# Patient Record
Sex: FEMALE | Race: WHITE | Employment: PART TIME | URBAN - METROPOLITAN AREA
[De-identification: names, ages, dates, MRNs, and addresses within clinical notes are randomized per-mention and may not be internally consistent; named-entity substitution may affect disease eponyms.]

---

## 2018-09-15 ENCOUNTER — HOSPITAL ENCOUNTER (OUTPATIENT)
Age: 48
Discharge: HOME | End: 2018-09-15
Payer: COMMERCIAL

## 2018-09-15 LAB
BACTERIA: ABNORMAL /HPF
BILIRUBIN URINE: NEGATIVE
BLOOD, URINE: ABNORMAL
CLARITY: CLEAR
COLOR: YELLOW
EPITHELIAL CELLS, UA: ABNORMAL /HPF
GLUCOSE URINE: NEGATIVE MG/DL
KETONES, URINE: NEGATIVE MG/DL
LEUKOCYTE ESTERASE, URINE: NEGATIVE
NITRITE, URINE: NEGATIVE
PH UA: 6 (ref 5–9)
PROTEIN UA: ABNORMAL MG/DL
RBC UA: ABNORMAL /HPF (ref 0–2)
SPECIFIC GRAVITY UA: 1.02 (ref 1–1.03)
UROBILINOGEN, URINE: 0.2 E.U./DL
WBC UA: ABNORMAL /HPF (ref 0–5)

## 2018-09-15 PROCEDURE — 81001 URINALYSIS AUTO W/SCOPE: CPT

## 2018-09-15 PROCEDURE — 87088 URINE BACTERIA CULTURE: CPT

## 2018-09-17 LAB — URINE CULTURE, ROUTINE: NORMAL

## 2020-02-07 ENCOUNTER — HOSPITAL ENCOUNTER (OUTPATIENT)
Age: 50
Discharge: HOME | End: 2020-02-07
Payer: COMMERCIAL

## 2020-02-07 LAB
BACTERIA: ABNORMAL /HPF
BILIRUBIN URINE: NEGATIVE
BLOOD, URINE: ABNORMAL
CLARITY: CLEAR
COLOR: YELLOW
CRYSTALS, UA: ABNORMAL
EPITHELIAL CELLS, UA: ABNORMAL /HPF
GLUCOSE URINE: NEGATIVE MG/DL
INR BLD: 0.9
KETONES, URINE: NEGATIVE MG/DL
LEUKOCYTE ESTERASE, URINE: NEGATIVE
NITRITE, URINE: NEGATIVE
PH UA: 6 (ref 5–9)
PROTEIN UA: 30 MG/DL
PROTHROMBIN TIME: 10.1 SEC (ref 9.3–12.4)
RBC UA: >20 /HPF (ref 0–2)
SPECIFIC GRAVITY UA: 1.02 (ref 1–1.03)
UROBILINOGEN, URINE: 0.2 E.U./DL
WBC UA: ABNORMAL /HPF (ref 0–5)

## 2020-02-07 PROCEDURE — 36415 COLL VENOUS BLD VENIPUNCTURE: CPT

## 2020-02-07 PROCEDURE — 85610 PROTHROMBIN TIME: CPT

## 2020-02-07 PROCEDURE — 81001 URINALYSIS AUTO W/SCOPE: CPT

## 2020-02-07 PROCEDURE — 87088 URINE BACTERIA CULTURE: CPT

## 2020-02-09 LAB — URINE CULTURE, ROUTINE: NORMAL

## 2020-02-17 ENCOUNTER — APPOINTMENT (OUTPATIENT)
Dept: GENERAL RADIOLOGY | Age: 50
End: 2020-02-17
Payer: COMMERCIAL

## 2020-02-17 ENCOUNTER — HOSPITAL ENCOUNTER (EMERGENCY)
Age: 50
Discharge: HOME | End: 2020-02-17
Attending: EMERGENCY MEDICINE
Payer: COMMERCIAL

## 2020-02-17 ENCOUNTER — APPOINTMENT (OUTPATIENT)
Dept: CT IMAGING | Age: 50
End: 2020-02-17
Payer: COMMERCIAL

## 2020-02-17 VITALS
DIASTOLIC BLOOD PRESSURE: 97 MMHG | WEIGHT: 135 LBS | HEART RATE: 66 BPM | BODY MASS INDEX: 23.92 KG/M2 | RESPIRATION RATE: 16 BRPM | TEMPERATURE: 97.4 F | HEIGHT: 63 IN | OXYGEN SATURATION: 97 % | SYSTOLIC BLOOD PRESSURE: 156 MMHG

## 2020-02-17 LAB
ALBUMIN SERPL-MCNC: 4.2 G/DL (ref 3.5–5.2)
ALP BLD-CCNC: 42 U/L (ref 35–104)
ALT SERPL-CCNC: 20 U/L (ref 0–32)
ANION GAP SERPL CALCULATED.3IONS-SCNC: 10 MMOL/L (ref 7–16)
AST SERPL-CCNC: 23 U/L (ref 0–31)
BACTERIA: ABNORMAL /HPF
BASOPHILS ABSOLUTE: 0.01 E9/L (ref 0–0.2)
BASOPHILS RELATIVE PERCENT: 0.1 % (ref 0–2)
BILIRUB SERPL-MCNC: 0.4 MG/DL (ref 0–1.2)
BILIRUBIN URINE: NEGATIVE
BLOOD, URINE: ABNORMAL
BUN BLDV-MCNC: 13 MG/DL (ref 6–20)
CALCIUM SERPL-MCNC: 9.1 MG/DL (ref 8.6–10.2)
CHLORIDE BLD-SCNC: 98 MMOL/L (ref 98–107)
CLARITY: ABNORMAL
CO2: 23 MMOL/L (ref 22–29)
COLOR: ABNORMAL
CREAT SERPL-MCNC: 0.9 MG/DL (ref 0.5–1)
CRYSTALS, UA: ABNORMAL /HPF
EOSINOPHILS ABSOLUTE: 0 E9/L (ref 0.05–0.5)
EOSINOPHILS RELATIVE PERCENT: 0 % (ref 0–6)
GFR AFRICAN AMERICAN: >60
GFR NON-AFRICAN AMERICAN: >60 ML/MIN/1.73
GLUCOSE BLD-MCNC: 146 MG/DL (ref 74–99)
GLUCOSE URINE: NEGATIVE MG/DL
HCG(URINE) PREGNANCY TEST: NEGATIVE
HCG, URINE, POC: NEGATIVE
HCT VFR BLD CALC: 44.4 % (ref 34–48)
HEMOGLOBIN: 14.7 G/DL (ref 11.5–15.5)
IMMATURE GRANULOCYTES #: 0.06 E9/L
IMMATURE GRANULOCYTES %: 0.5 % (ref 0–5)
KETONES, URINE: ABNORMAL MG/DL
LACTIC ACID: 0.9 MMOL/L (ref 0.5–2.2)
LEUKOCYTE ESTERASE, URINE: ABNORMAL
LYMPHOCYTES ABSOLUTE: 0.66 E9/L (ref 1.5–4)
LYMPHOCYTES RELATIVE PERCENT: 6 % (ref 20–42)
Lab: NORMAL
MCH RBC QN AUTO: 30.5 PG (ref 26–35)
MCHC RBC AUTO-ENTMCNC: 33.1 % (ref 32–34.5)
MCV RBC AUTO: 92.1 FL (ref 80–99.9)
MONOCYTES ABSOLUTE: 0.4 E9/L (ref 0.1–0.95)
MONOCYTES RELATIVE PERCENT: 3.6 % (ref 2–12)
NEGATIVE QC PASS/FAIL: NORMAL
NEUTROPHILS ABSOLUTE: 9.91 E9/L (ref 1.8–7.3)
NEUTROPHILS RELATIVE PERCENT: 89.8 % (ref 43–80)
NITRITE, URINE: NEGATIVE
PDW BLD-RTO: 12 FL (ref 11.5–15)
PH UA: 6.5 (ref 5–9)
PLATELET # BLD: 237 E9/L (ref 130–450)
PMV BLD AUTO: 9.8 FL (ref 7–12)
POSITIVE QC PASS/FAIL: NORMAL
POTASSIUM REFLEX MAGNESIUM: 3.6 MMOL/L (ref 3.5–5)
PROTEIN UA: 30 MG/DL
RBC # BLD: 4.82 E12/L (ref 3.5–5.5)
RBC UA: ABNORMAL /HPF (ref 0–2)
SODIUM BLD-SCNC: 131 MMOL/L (ref 132–146)
SPECIFIC GRAVITY UA: >=1.03 (ref 1–1.03)
TOTAL PROTEIN: 6.3 G/DL (ref 6.4–8.3)
UROBILINOGEN, URINE: 0.2 E.U./DL
WBC # BLD: 11 E9/L (ref 4.5–11.5)
WBC UA: ABNORMAL /HPF (ref 0–5)

## 2020-02-17 PROCEDURE — 2580000003 HC RX 258: Performed by: PHYSICIAN ASSISTANT

## 2020-02-17 PROCEDURE — 74177 CT ABD & PELVIS W/CONTRAST: CPT

## 2020-02-17 PROCEDURE — 83605 ASSAY OF LACTIC ACID: CPT

## 2020-02-17 PROCEDURE — 96374 THER/PROPH/DIAG INJ IV PUSH: CPT

## 2020-02-17 PROCEDURE — 2580000003 HC RX 258: Performed by: RADIOLOGY

## 2020-02-17 PROCEDURE — 85025 COMPLETE CBC W/AUTO DIFF WBC: CPT

## 2020-02-17 PROCEDURE — 6360000002 HC RX W HCPCS: Performed by: PHYSICIAN ASSISTANT

## 2020-02-17 PROCEDURE — 96375 TX/PRO/DX INJ NEW DRUG ADDON: CPT

## 2020-02-17 PROCEDURE — 99284 EMERGENCY DEPT VISIT MOD MDM: CPT

## 2020-02-17 PROCEDURE — 6360000002 HC RX W HCPCS: Performed by: EMERGENCY MEDICINE

## 2020-02-17 PROCEDURE — 80053 COMPREHEN METABOLIC PANEL: CPT

## 2020-02-17 PROCEDURE — 96376 TX/PRO/DX INJ SAME DRUG ADON: CPT

## 2020-02-17 PROCEDURE — 81001 URINALYSIS AUTO W/SCOPE: CPT

## 2020-02-17 PROCEDURE — 6360000004 HC RX CONTRAST MEDICATION: Performed by: RADIOLOGY

## 2020-02-17 PROCEDURE — 81025 URINE PREGNANCY TEST: CPT

## 2020-02-17 RX ORDER — SULFAMETHOXAZOLE AND TRIMETHOPRIM 800; 160 MG/1; MG/1
1 TABLET ORAL 2 TIMES DAILY
COMMUNITY
End: 2021-02-08

## 2020-02-17 RX ORDER — SODIUM CHLORIDE 0.9 % (FLUSH) 0.9 %
10 SYRINGE (ML) INJECTION PRN
Status: COMPLETED | OUTPATIENT
Start: 2020-02-17 | End: 2020-02-17

## 2020-02-17 RX ORDER — 0.9 % SODIUM CHLORIDE 0.9 %
1000 INTRAVENOUS SOLUTION INTRAVENOUS ONCE
Status: COMPLETED | OUTPATIENT
Start: 2020-02-17 | End: 2020-02-17

## 2020-02-17 RX ORDER — MORPHINE SULFATE 2 MG/ML
2 INJECTION, SOLUTION INTRAMUSCULAR; INTRAVENOUS ONCE
Status: COMPLETED | OUTPATIENT
Start: 2020-02-17 | End: 2020-02-17

## 2020-02-17 RX ORDER — NAPROXEN 500 MG/1
500 TABLET ORAL 2 TIMES DAILY WITH MEALS
Qty: 20 TABLET | Refills: 0 | Status: SHIPPED | OUTPATIENT
Start: 2020-02-17 | End: 2021-02-09

## 2020-02-17 RX ORDER — NAPROXEN 500 MG/1
500 TABLET ORAL 2 TIMES DAILY WITH MEALS
Qty: 20 TABLET | Refills: 0 | Status: SHIPPED | OUTPATIENT
Start: 2020-02-17 | End: 2020-02-17 | Stop reason: SDUPTHER

## 2020-02-17 RX ORDER — KETOROLAC TROMETHAMINE 30 MG/ML
30 INJECTION, SOLUTION INTRAMUSCULAR; INTRAVENOUS ONCE
Status: COMPLETED | OUTPATIENT
Start: 2020-02-17 | End: 2020-02-17

## 2020-02-17 RX ADMIN — Medication 10 ML: at 21:08

## 2020-02-17 RX ADMIN — IOPAMIDOL 110 ML: 755 INJECTION, SOLUTION INTRAVENOUS at 21:08

## 2020-02-17 RX ADMIN — MORPHINE SULFATE 2 MG: 2 INJECTION, SOLUTION INTRAMUSCULAR; INTRAVENOUS at 22:21

## 2020-02-17 RX ADMIN — KETOROLAC TROMETHAMINE 30 MG: 30 INJECTION, SOLUTION INTRAMUSCULAR; INTRAVENOUS at 18:41

## 2020-02-17 RX ADMIN — MORPHINE SULFATE 2 MG: 2 INJECTION, SOLUTION INTRAMUSCULAR; INTRAVENOUS at 20:45

## 2020-02-17 RX ADMIN — SODIUM CHLORIDE 1000 ML: 9 INJECTION, SOLUTION INTRAVENOUS at 18:41

## 2020-02-17 ASSESSMENT — PAIN SCALES - GENERAL
PAINLEVEL_OUTOF10: 5
PAINLEVEL_OUTOF10: 3
PAINLEVEL_OUTOF10: 10
PAINLEVEL_OUTOF10: 4
PAINLEVEL_OUTOF10: 7
PAINLEVEL_OUTOF10: 3
PAINLEVEL_OUTOF10: 4

## 2020-02-17 ASSESSMENT — PAIN DESCRIPTION - LOCATION: LOCATION: ABDOMEN

## 2020-02-17 ASSESSMENT — PAIN DESCRIPTION - PAIN TYPE: TYPE: ACUTE PAIN

## 2020-02-17 NOTE — ED PROVIDER NOTES
ED Attending  CC: No    HPI:  2/17/20, Time: 6:33 PM         Tiff Zavala is a 54 y.o. female presenting to the ED for right flank pain, beginning this morning after lithotripsy. The complaint has been persistent, moderate in severity, and worsened by nothing. Patient had an 11mm kidney stone on the right for which she underwent lithotripsy this morning with Dr. Jalen Espana. Reports she is still able to urinate without dysuria or steve blood. Afebrile at home. She took Norco twice at home without relief of symptoms. Patient and spouse deny all other symptoms at this time. Review of Systems:   Pertinent positives and negatives are stated within HPI, all other systems reviewed and are negative.          --------------------------------------------- PAST HISTORY ---------------------------------------------  Past Medical History:  has a past medical history of Anxiety, Common variable immunodeficiency ([facility] [address] Environmental allergies, Kidney stone, and Thyroid disease. Past Surgical History:  has a past surgical history that includes [place] tooth extraction; Upper gastrointestinal endoscopy (4/11/2016); and Colonoscopy (4/11/2016). Social History:  reports that she has never smoked. She has never used smokeless tobacco. She reports that she does not drink alcohol or use drugs. Family History: family history is not on file. The patients home medications have been reviewed. Allergies: Patient has no known allergies.     -------------------------------------------------- RESULTS -------------------------------------------------  All laboratory and radiology results have been personally reviewed by myself   LABS:  Results for orders placed or performed during the hospital encounter of 02/17/20   CBC Auto Differential   Result Value Ref Range    WBC 11.0 4.5 - 11.5 E9/L    RBC 4.82 3.50 - 5.50 E12/L    Hemoglobin 14.7 11.5 - 15.5 g/dL    Hematocrit 44.4 34.0 - 48.0 %    MCV 92.1 80.0 - 99.9 fL    MCH - 2 /HPF    Bacteria, UA FEW (A) None Seen /HPF    Crystals Many (A) None Seen /HPF   Pregnancy, Urine   Result Value Ref Range    HCG(Urine) Pregnancy Test NEGATIVE NEGATIVE   POC Pregnancy Urine Qual   Result Value Ref Range    HCG, Urine, POC Negative Negative    Lot Number [id number]     Positive QC Pass/Fail Pass     Negative QC Pass/Fail Pass        RADIOLOGY:  Interpreted by Radiologist.  CT ABDOMEN PELVIS W IV CONTRAST Additional Contrast? None   Final Result      There is a right-sided hydronephrosis secondary to 1.2 x 0.62 cm   calculus seen in the distal third right ureter. There is perinephric fluid collection seen this is secondary to   rupture of the calyces. ------------------------- NURSING NOTES AND VITALS REVIEWED ---------------------------   The nursing notes within the ED encounter and vital signs as below have been reviewed. BP (!) 156/97   Pulse 66   Temp 97.4 °F (36.3 °C) (Oral)   Resp 16   Ht 5' 3\" (1.6 m)   Wt 135 lb (61.2 kg)   SpO2 97%   BMI 23.91 kg/m²   Oxygen Saturation Interpretation: Normal      ---------------------------------------------------PHYSICAL EXAM--------------------------------------      Constitutional/General: Alert and oriented x3, well appearing, non toxic in NAD  Head: Normocephalic and atraumatic  Eyes: PERRL, EOMI  Mouth: Oropharynx clear, handling secretions, no trismus  Neck: Supple, full ROM,   Pulmonary: Lungs clear to auscultation bilaterally, no wheezes, rales, or rhonchi. Not in respiratory distress  Cardiovascular:  Regular rate and rhythm, no murmurs, gallops, or rubs. 2+ distal pulses  Abdomen: Soft, non tender, non distended, right CVA TTP  Extremities: Moves all extremities x 4.  Warm and well perfused  Skin: warm and dry without rash  Neurologic: GCS 15,  Psych: Normal Affect      ------------------------------ ED COURSE/MEDICAL DECISION MAKING----------------------  Medications   morphine (PF) injection 2 mg (has no administration in time range)   0.9 % sodium chloride bolus (0 mLs Intravenous Stopped 2/17/20 2011)   ketorolac (TORADOL) injection 30 mg (30 mg Intravenous Given 2/17/20 1841)   morphine (PF) injection 2 mg (2 mg Intravenous Given 2/17/20 2045)   iopamidol (ISOVUE-370) 76 % injection 110 mL (110 mLs Intravenous Given 2/17/20 2108)   sodium chloride flush 0.9 % injection 10 mL (10 mLs Intravenous Given 2/17/20 2108)         ED COURSE:  ED Course as of Feb 17 2220   Mon Feb 17, 2020 2204 Reassessed patient. Remains stable. Discussed results and recommendations. [MS]      ED Course User Index  [MS] Rashmi Reardon       Medical Decision Making:    Patient is a 54year old female presenting to the ED with right flank pain s/p lithotripsy today. Patient is afebrile, nontoxic appearing, an in no acute distress. Recommend follow up with urology for recheck tomorrow. Return to the ED with new or worsening symptoms. Will add naproxen for additional pain medication (along with Norco she has at home). Patient and family voiced understanding and are agreeable to the above treatment plan. Counseling: The emergency provider has spoken with the patient and family and discussed todays results, in addition to providing specific details for the plan of care and counseling regarding the diagnosis and prognosis. Questions are answered at this time and they are agreeable with the plan.      --------------------------------- IMPRESSION AND DISPOSITION ---------------------------------    IMPRESSION  1. Post-op pain        DISPOSITION  Disposition: Discharge to home  Patient condition is stable      NOTE: This report was transcribed using voice recognition software.  Every effort was made to ensure accuracy; however, inadvertent computerized transcription errors may be present       Rashmi Reardon  02/17/20 2220

## 2021-01-28 ENCOUNTER — TELEPHONE (OUTPATIENT)
Dept: ADMINISTRATIVE | Age: 51
End: 2021-01-28

## 2021-02-08 RX ORDER — LEVOTHYROXINE SODIUM 0.1 MG/1
TABLET ORAL
COMMUNITY
Start: 2021-01-10 | End: 2021-06-29 | Stop reason: SDUPTHER

## 2021-02-08 RX ORDER — MONTELUKAST SODIUM 10 MG/1
TABLET ORAL
COMMUNITY
Start: 2020-12-15 | End: 2022-07-05 | Stop reason: SDUPTHER

## 2021-02-08 RX ORDER — FLUTICASONE PROPIONATE 50 MCG
SPRAY, SUSPENSION (ML) NASAL
COMMUNITY
Start: 2021-01-09

## 2021-02-08 RX ORDER — FLUOXETINE HYDROCHLORIDE 20 MG/1
CAPSULE ORAL
COMMUNITY
Start: 2021-01-07 | End: 2021-02-19 | Stop reason: SDUPTHER

## 2021-02-08 RX ORDER — BUDESONIDE AND FORMOTEROL FUMARATE DIHYDRATE 160; 4.5 UG/1; UG/1
AEROSOL RESPIRATORY (INHALATION)
COMMUNITY
Start: 2021-01-09

## 2021-02-08 RX ORDER — NORETHINDRONE ACETATE/ETHINYL ESTRADIOL 1MG-20MCG
KIT ORAL
COMMUNITY
Start: 2020-12-03 | End: 2021-06-29 | Stop reason: SDUPTHER

## 2021-02-08 ASSESSMENT — ENCOUNTER SYMPTOMS
COUGH: 0
CONSTIPATION: 0
VOMITING: 0
SHORTNESS OF BREATH: 0
ABDOMINAL PAIN: 0
NAUSEA: 0
WHEEZING: 0
DIARRHEA: 0
BACK PAIN: 0

## 2021-02-09 ENCOUNTER — OFFICE VISIT (OUTPATIENT)
Dept: PRIMARY CARE CLINIC | Age: 51
End: 2021-02-09
Payer: COMMERCIAL

## 2021-02-09 VITALS
WEIGHT: 129 LBS | DIASTOLIC BLOOD PRESSURE: 90 MMHG | SYSTOLIC BLOOD PRESSURE: 148 MMHG | HEIGHT: 63 IN | OXYGEN SATURATION: 98 % | HEART RATE: 97 BPM | TEMPERATURE: 98.4 F | BODY MASS INDEX: 22.86 KG/M2

## 2021-02-09 DIAGNOSIS — E03.9 ACQUIRED HYPOTHYROIDISM: Primary | ICD-10-CM

## 2021-02-09 DIAGNOSIS — R73.01 IMPAIRED FASTING GLUCOSE: ICD-10-CM

## 2021-02-09 DIAGNOSIS — Z91.09 ENVIRONMENTAL ALLERGIES: ICD-10-CM

## 2021-02-09 DIAGNOSIS — D83.9 CVID (COMMON VARIABLE IMMUNODEFICIENCY) (HCC): ICD-10-CM

## 2021-02-09 DIAGNOSIS — E55.9 VITAMIN D INSUFFICIENCY: ICD-10-CM

## 2021-02-09 PROCEDURE — G8484 FLU IMMUNIZE NO ADMIN: HCPCS | Performed by: FAMILY MEDICINE

## 2021-02-09 PROCEDURE — G8427 DOCREV CUR MEDS BY ELIG CLIN: HCPCS | Performed by: FAMILY MEDICINE

## 2021-02-09 PROCEDURE — G8420 CALC BMI NORM PARAMETERS: HCPCS | Performed by: FAMILY MEDICINE

## 2021-02-09 PROCEDURE — 99204 OFFICE O/P NEW MOD 45 MIN: CPT | Performed by: FAMILY MEDICINE

## 2021-02-09 PROCEDURE — 1036F TOBACCO NON-USER: CPT | Performed by: FAMILY MEDICINE

## 2021-02-09 RX ORDER — CLARITHROMYCIN 250 MG/1
250 TABLET, FILM COATED ORAL DAILY
COMMUNITY
Start: 2021-02-05

## 2021-02-09 RX ORDER — BIOTIN 1000 MCG
TABLET,CHEWABLE ORAL
COMMUNITY
End: 2022-02-02

## 2021-02-09 RX ORDER — PSEUDOEPHEDRINE HYDROCHLORIDE 30 MG/1
30 TABLET ORAL EVERY 4 HOURS PRN
COMMUNITY
End: 2021-03-10 | Stop reason: ALTCHOICE

## 2021-02-09 ASSESSMENT — PATIENT HEALTH QUESTIONNAIRE - PHQ9
1. LITTLE INTEREST OR PLEASURE IN DOING THINGS: 0
SUM OF ALL RESPONSES TO PHQ9 QUESTIONS 1 & 2: 0
SUM OF ALL RESPONSES TO PHQ QUESTIONS 1-9: 0
2. FEELING DOWN, DEPRESSED OR HOPELESS: 0

## 2021-02-09 NOTE — PROGRESS NOTES
21  Augusta Burden : 1970 Sex: female  Age: 52 y.o. Chief Complaint   Patient presents with   [facility].     previous pcp dr Madisyn Glasgow     HPI:  52 y.o. female presents today to establish care with a new PCP. Previously seen by Dr. aCmron Ashley. Patient's chart, medical, surgical and medication history all reviewed. Hypothyroidism  Patient presents for routine follow up of Hypothyroidism. Current symptoms: none. Patient denies change in energy level, diarrhea, heat / cold intolerance, nervousness, palpitations and weight changes. Symptoms have been well-controlled. No difficulty swallowing or masses felt. Last TSH was unknown. Patient is currently taking Levothyroxine 100 mcg. She has history of CVID. Takes Biaxin daily. Follows with Dr. Jerica Sow for management. BP elevated- patient states that she has white coat syndrome. BP always elevated in office. ROS:  Review of Systems   Constitutional: Negative for chills, fatigue and fever. Respiratory: Negative for cough, shortness of breath and wheezing. Cardiovascular: Negative for chest pain and palpitations. Gastrointestinal: Negative for abdominal pain, constipation, diarrhea, nausea and vomiting. Musculoskeletal: Negative for arthralgias and back pain. Skin: Negative for rash. Neurological: Negative for dizziness and headaches. Psychiatric/Behavioral: Negative for dysphoric mood. The patient is not nervous/anxious. All other systems reviewed and are negative.        Current Outpatient Medications on File Prior to Visit   Medication Sig Dispense Refill    clarithromycin (BIAXIN) 250 MG tablet Take 250 mg by mouth daily      Cholecalciferol (VITAMIN D3) 25 MCG (1000 UT) CAPS Take by mouth      Turmeric Curcumin 500 MG CAPS Take by mouth      Multiple Vitamins-Minerals (ONE-A-DAY WOMENS PO) Take by mouth      pseudoephedrine (SUDAFED) 30 MG tablet Take 30 mg by mouth every 4 hours as needed for Congestion  Biotin 1000 MCG CHEW Take by mouth      SYMBICORT 160-4.5 MCG/ACT AERO inhale 1 to 2 puffs by mouth every 12 hours Rinse mouth after use      fluticasone (FLONASE) 50 MCG/ACT nasal spray instill 1 to 2 sprays into each nostril every morning      levothyroxine (SYNTHROID) 100 MCG tablet take 1 tablet by mouth once daily      montelukast (SINGULAIR) 10 MG tablet take 1 tablet by mouth at bedtime      RAMILA 1/20 1-20 MG-MCG per tablet take 1 tablet by mouth once daily as directed      FLUoxetine (PROZAC) 20 MG capsule take 1 capsule by mouth three times a day      Probiotic Product (PROBIOTIC DAILY PO) Take by mouth LD 4/6/16      Omega-3 Fatty Acids (FISH OIL PO) Take by mouth LD 4/6/16      azelastine (ASTELIN) 137 MCG/SPRAY nasal spray 2 sprays by Nasal route nightly. Use in each nostril as directed        No current facility-administered medications on file prior to visit. No Known Allergies    Past Medical History:   Diagnosis Date    Anxiety     Common variable immunodeficiency (HCC)     FU with Dr. Yuliet Mcgarry, no recent issues, stable     Environmental allergies     Kidney stone     Thyroid disease      Past Surgical History:   Procedure Laterality Date    COLONOSCOPY  04/11/2016    LITHOTRIPSY  2019    UPPER GASTROINTESTINAL ENDOSCOPY  04/11/2016    WISDOM TOOTH EXTRACTION       History reviewed. No pertinent family history.   Social History     Socioeconomic History    Marital status: Single     Spouse name: Not on file    Number of children: Not on file    Years of education: Not on file    Highest education level: Not on file   Occupational History    Not on file   Social Needs    Financial resource strain: Not on file    Food insecurity     Worry: Not on file     Inability: Not on file    Transportation needs     Medical: Not on file     Non-medical: Not on file   Tobacco Use    Smoking status: Never Smoker    Smokeless tobacco: Never Used   Substance and Sexual Activity    Alcohol use: No    Drug use: No    Sexual activity: Not on file   Lifestyle    Physical activity     Days per week: Not on file     Minutes per session: Not on file    Stress: Not on file   Relationships    Social connections     Talks on phone: Not on file     Gets together: Not on file     Attends [employer] service: Not on file     Active member of club or organization: Not on file     Attends meetings of clubs or organizations: Not on file     Relationship status: Not on file    Intimate partner violence     Fear of current or ex partner: Not on file     Emotionally abused: Not on file     Physically abused: Not on file     Forced sexual activity: Not on file   Other Topics Concern    Not on file   Social History Narrative    Not on file       Vitals:    02/09/21 1009 02/09/21 1020 02/09/21 1033   BP: (!) 152/98 (!) 150/96 (!) 148/90   Pulse: 97     Temp: 98.4 °F (36.9 °C)     SpO2: 98%     Weight: 129 lb (58.5 kg)     Height: 5' 3\" (1.6 m)         Physical Exam:  Physical Exam  Vitals signs and nursing note reviewed. Constitutional:       General: She is not in acute distress. Appearance: Normal appearance. She is well-developed and normal weight. She is not ill-appearing. HENT:      Head: Normocephalic and atraumatic. Right Ear: Hearing and external ear normal.      Left Ear: Hearing and external ear normal.      Nose:      Comments: Wearing mask  Eyes:      General: Lids are normal. No scleral icterus. Extraocular Movements: Extraocular movements intact. Conjunctiva/sclera: Conjunctivae normal.   Neck:      Musculoskeletal: Normal range of motion and neck supple. Thyroid: No thyromegaly. Cardiovascular:      Rate and Rhythm: Normal rate and regular rhythm. Heart sounds: Normal heart sounds. No murmur. Pulmonary:      Effort: Pulmonary effort is normal. No respiratory distress. Breath sounds: Normal breath sounds. No wheezing.    Musculoskeletal: Normal range of motion. General: No tenderness or deformity. Right lower leg: No edema. Left lower leg: No edema. Lymphadenopathy:      Cervical: No cervical adenopathy. Skin:     General: Skin is warm and dry. Findings: No rash. Neurological:      General: No focal deficit present. Mental Status: She is alert and oriented to person, place, and time. Gait: Gait normal.   Psychiatric:         Mood and Affect: Affect normal. Mood is anxious. Speech: Speech normal.         Behavior: Behavior normal.         Thought Content:  Thought content normal.         Labs:  CBC with Differential:    Lab Results   Component Value Date    WBC 11.0 02/17/2020    RBC 4.82 02/17/2020    HGB 14.7 02/17/2020    HCT 44.4 02/17/2020     02/17/2020    MCV 92.1 02/17/2020    MCH 30.5 02/17/2020    MCHC 33.1 02/17/2020    RDW 12.0 02/17/2020    SEGSPCT 63 09/05/2012    LYMPHOPCT 6.0 02/17/2020    MONOPCT 3.6 02/17/2020    BASOPCT 0.1 02/17/2020    MONOSABS 0.40 02/17/2020    LYMPHSABS 0.66 02/17/2020    EOSABS 0.00 02/17/2020    BASOSABS 0.01 02/17/2020     CMP:    Lab Results   Component Value Date     02/17/2020    K 3.6 02/17/2020    CL 98 02/17/2020    CO2 23 02/17/2020    BUN 13 02/17/2020    CREATININE 0.9 02/17/2020    GFRAA >60 02/17/2020    LABGLOM >60 02/17/2020    GLUCOSE 146 02/17/2020    PROT 6.3 02/17/2020    LABALBU 4.2 02/17/2020    CALCIUM 9.1 02/17/2020    BILITOT 0.4 02/17/2020    ALKPHOS 42 02/17/2020    AST 23 02/17/2020    ALT 20 02/17/2020     U/A:    Lab Results   Component Value Date    COLORU AMBAR 02/17/2020    PROTEINU 30 02/17/2020    PHUR 6.5 02/17/2020    LABCAST MODERATE 02/22/2016    WBCUA 1-3 02/17/2020    RBCUA PACKED 02/17/2020    RBCUA 0-1 09/05/2012    BACTERIA FEW 02/17/2020    CLARITYU CLOUDY 02/17/2020    SPECGRAV >=1.030 02/17/2020    LEUKOCYTESUR TRACE 02/17/2020    UROBILINOGEN 0.2 02/17/2020    BILIRUBINUR Negative 02/17/2020    BLOODU LARGE 02/17/2020    GLUCOSEU Negative 02/17/2020    AMORPHOUS FEW 02/22/2016     HgBA1c:  No results found for: LABA1C  FLP:  No results found for: TRIG, HDL, LDLCALC, LDLDIRECT, LABVLDL  TSH:  No results found for: TSH       Assessment and Plan:  Evaristo Puri was seen today for establish care. Diagnoses and all orders for this visit:    Acquired hypothyroidism  -     CBC Auto Differential; Future  -     Comprehensive Metabolic Panel; Future  -     Lipid Panel; Future  -     TSH without Reflex; Future  -     Urinalysis; Future  -     T4, Free; Future  Due for recheck of routine labs. Doesn't know when she last had lipids/sugar checked. Will adjust medication as needed based on labs. Environmental allergies  Follows with Dr. Zeynep Hyman at this time- on Symbicort and multiple allergy medications. Occasionally still with sinus congestion. CVID (common variable immunodeficiency) ([facility] [address]  On Biaxin daily. Follows with ID. Vitamin D insufficiency  -     Vitamin D 25 Hydroxy; Future    Impaired fasting glucose  -     Hemoglobin A1C; Future        Return in about 6 months (around 8/9/2021) for AWV.       Seen By:  Verito Mcfarlane DO

## 2021-02-19 RX ORDER — FLUOXETINE HYDROCHLORIDE 20 MG/1
CAPSULE ORAL
Qty: 90 CAPSULE | Refills: 5 | Status: SHIPPED
Start: 2021-02-19 | End: 2021-06-29 | Stop reason: SDUPTHER

## 2021-03-10 ENCOUNTER — TELEPHONE (OUTPATIENT)
Dept: ADMINISTRATIVE | Age: 51
End: 2021-03-10

## 2021-03-10 ENCOUNTER — OFFICE VISIT (OUTPATIENT)
Dept: FAMILY MEDICINE CLINIC | Age: 51
End: 2021-03-10
Payer: COMMERCIAL

## 2021-03-10 VITALS
WEIGHT: 131 LBS | SYSTOLIC BLOOD PRESSURE: 136 MMHG | HEIGHT: 63 IN | BODY MASS INDEX: 23.21 KG/M2 | RESPIRATION RATE: 18 BRPM | DIASTOLIC BLOOD PRESSURE: 82 MMHG | OXYGEN SATURATION: 97 % | TEMPERATURE: 97.5 F | HEART RATE: 102 BPM

## 2021-03-10 DIAGNOSIS — M54.50 ACUTE BILATERAL LOW BACK PAIN WITHOUT SCIATICA: Primary | ICD-10-CM

## 2021-03-10 PROCEDURE — G8484 FLU IMMUNIZE NO ADMIN: HCPCS | Performed by: PHYSICIAN ASSISTANT

## 2021-03-10 PROCEDURE — 1036F TOBACCO NON-USER: CPT | Performed by: PHYSICIAN ASSISTANT

## 2021-03-10 PROCEDURE — G8427 DOCREV CUR MEDS BY ELIG CLIN: HCPCS | Performed by: PHYSICIAN ASSISTANT

## 2021-03-10 PROCEDURE — G8420 CALC BMI NORM PARAMETERS: HCPCS | Performed by: PHYSICIAN ASSISTANT

## 2021-03-10 PROCEDURE — 96372 THER/PROPH/DIAG INJ SC/IM: CPT | Performed by: PHYSICIAN ASSISTANT

## 2021-03-10 RX ORDER — KETOROLAC TROMETHAMINE 30 MG/ML
30 INJECTION, SOLUTION INTRAMUSCULAR; INTRAVENOUS ONCE
Status: COMPLETED | OUTPATIENT
Start: 2021-03-10 | End: 2021-03-10

## 2021-03-10 RX ORDER — METHOCARBAMOL 750 MG/1
750 TABLET, FILM COATED ORAL 4 TIMES DAILY
Qty: 40 TABLET | Refills: 0 | Status: SHIPPED | OUTPATIENT
Start: 2021-03-10 | End: 2021-03-20

## 2021-03-10 RX ADMIN — KETOROLAC TROMETHAMINE 30 MG: 30 INJECTION, SOLUTION INTRAMUSCULAR; INTRAVENOUS at 15:43

## 2021-03-10 NOTE — PROGRESS NOTES
3/10/21  Rashaun Slade : 1970 Sex: female  Age 52 y.o. Subjective:  Chief Complaint   Patient presents with    Back Pain     low back pain for 3-4 days          HPI:   Rashaun Slade , 52 y.o. female presents to [facility] care for evaluation of diffuse low back pain. The patient states that she has had this low back pain that has been ongoing for the last 3 to 4 days. The patient denies any specific mechanism of injury but has been doing a lot of bending and twisting. This may have aggravated the back. The patient denies falls or direct trauma to the back. The patient is not having any bladder or bowel incontinence, urinary tension or saddle anesthesia. No fevers, chills. The patient is on having any urinary symptoms. No hematuria. The patient otherwise seems to be doing well. She has been taking medications at home without any significant improvement of the symptoms    ROS:   Unless otherwise stated in this report the patient's positive and negative responses for review of systems for constitutional, eyes, ENT, cardiovascular, respiratory, gastrointestinal, neurological, , musculoskeletal, and integument systems and related systems to the presenting problem are either stated in the history of present illness or were not pertinent or were negative for the symptoms and/or complaints related to the presenting medical problem. Positives and pertinent negatives as per HPI. All others reviewed and are negative. PMH:     Past Medical History:   Diagnosis Date    Anxiety     Common variable immunodeficiency ([facility] [address]     FU with Dr. Missy Fuentes, no recent issues, stable     Environmental allergies     Kidney stone     Thyroid disease        Past Surgical History:   Procedure Laterality Date    COLONOSCOPY  2016    LITHOTRIPSY  2019    UPPER GASTROINTESTINAL ENDOSCOPY  2016    WISDOM TOOTH EXTRACTION         No family history on file.     Medications:     Current Outpatient Medications:     methocarbamol (ROBAXIN-750) 750 MG tablet, Take 1 tablet by mouth 4 times daily for 10 days, Disp: 40 tablet, Rfl: 0    oxaprozin (DAYPRO) 600 MG tablet, Take 1 tablet by mouth 2 times daily for 7 days Take on full stomach and with a full glass of water, Disp: 14 tablet, Rfl: 0    FLUoxetine (PROZAC) 20 MG capsule, take 1 capsule by mouth three times a day, Disp: 90 capsule, Rfl: 5    clarithromycin (BIAXIN) 250 MG tablet, Take 250 mg by mouth daily, Disp: , Rfl:     Cholecalciferol (VITAMIN D3) 25 MCG (1000 UT) CAPS, Take by mouth, Disp: , Rfl:     Turmeric Curcumin 500 MG CAPS, Take by mouth, Disp: , Rfl:     Multiple Vitamins-Minerals (ONE-A-DAY WOMENS PO), Take by mouth, Disp: , Rfl:     Biotin 1000 MCG CHEW, Take by mouth, Disp: , Rfl:     SYMBICORT 160-4.5 MCG/ACT AERO, inhale 1 to 2 puffs by mouth every 12 hours Rinse mouth after use, Disp: , Rfl:     fluticasone (FLONASE) 50 MCG/ACT nasal spray, instill 1 to 2 sprays into each nostril every morning, Disp: , Rfl:     levothyroxine (SYNTHROID) 100 MCG tablet, take 1 tablet by mouth once daily, Disp: , Rfl:     montelukast (SINGULAIR) 10 MG tablet, take 1 tablet by mouth at bedtime, Disp: , Rfl:     RAMILA 1/20 1-20 MG-MCG per tablet, take 1 tablet by mouth once daily as directed, Disp: , Rfl:     Probiotic Product (PROBIOTIC DAILY PO), Take by mouth LD 4/6/16, Disp: , Rfl:     Omega-3 Fatty Acids (FISH OIL PO), Take by mouth LD 4/6/16, Disp: , Rfl:     azelastine (ASTELIN) 137 MCG/SPRAY nasal spray, 2 sprays by Nasal route nightly. Use in each nostril as directed , Disp: , Rfl:     Allergies:   No Known Allergies    Social History:     Social History     Tobacco Use    Smoking status: Never Smoker    Smokeless tobacco: Never Used   Substance Use Topics    Alcohol use: No    Drug use: No       Patient lives at home.     Physical Exam:     Vitals:    03/10/21 1534   BP: 136/82   Pulse: 102   Resp: 18   Temp: 97.5 °F (36.4 °C)   SpO2: 97%   Weight: 131 lb (59.4 kg)   Height: 5' 3\" (1.6 m)       Exam:  Physical Exam  Vital Signs reviewed and nurse's notes reviewed. The patient is not hypoxic. General: Alert, no acute distress, patient resting comfortably  Skin: warm, intact, no pallor noted  Head: Normocephalic, atraumatic  Eye: Normal conjunctiva  Respiratory: No acute distress  Abdomen: Normal bowel sounds, soft, nontender, no masses detected. No rebound, guarding, or rigidity noted. Back: inspection of the back shows no obvious deformity, no swelling, no ecchymosis, contusion, abrasion, swelling, erythema, fluctuance or induration. Tenderness noted to diffuse tenderness noted to the lower lumbar areas around the lower lumbar and sacral area, no significant midline tenderness or thoracic spinal tenderness. No step offs or crepitus noted. Straight leg raise on left is negative. Straight leg raise on right is negative. DTR 2+ at patella bilaterally and symmetrically. No CVA tenderness noted bilaterally. The patient was able to stand on toes, heels, and rise from a squatting position. Pain was increased with bending and twisting at the waist.  Musculoskeletal: No deformity noted to bilateral lower extremities. no cyanosis or mottling noted. normal pulses at DP and PT 2+ bilaterally and symmetrically. Normal 5/5 strength at ankles with dorsiflexion and plantar flexion. Patient is able to ambulate. Normal sensation noted to the bilateral lower extremities. Neurological: alert and oriented x4, normal sensory and motor observed. DTR 2+ at patellar bilaterally. Psychiatric: Cooperative      Testing:           Medical Decision Making:     Vital signs reviewed    Past medical history reviewed. Allergies reviewed. Medications reviewed. Patient on arrival does not appear to be in any apparent distress or discomfort. The patient does not appear to be in any apparent distress.   The patient had been using ibuprofen at home. The patient states that she does not respond well to steroids. The patient will be given intramuscular injection of ketorolac. We will start the patient on Robaxin and Daypro. The patient is to use ice to the affected area. We discussed the limitation of x-rays. The patient was comfortable with the plan will follow up with PCP. Call with any questions or concerns. Clinical Impression:   Ginger Shi was seen today for back pain. Diagnoses and all orders for this visit:    Acute bilateral low back pain without sciatica    Other orders  -     ketorolac (TORADOL) injection 30 mg  -     methocarbamol (ROBAXIN-750) 750 MG tablet; Take 1 tablet by mouth 4 times daily for 10 days  -     oxaprozin (DAYPRO) 600 MG tablet; Take 1 tablet by mouth 2 times daily for 7 days Take on full stomach and with a full glass of water        The patient is to call for any concerns or return if any of the signs or symptoms worsen. The patient is to follow-up with PCP in the next 2-3 days for repeat evaluation repeat assessment or go directly to the emergency department.      SIGNATURE: Nam Kraus III, PA-C

## 2021-03-10 NOTE — TELEPHONE ENCOUNTER
Pt requested an appt stating she has been having lower back pain for 3-4 days. Doctor's schedule is full. Please call pt with recommendations/appts.

## 2021-03-15 ENCOUNTER — TELEPHONE (OUTPATIENT)
Dept: ADMINISTRATIVE | Age: 51
End: 2021-03-15

## 2021-03-15 NOTE — TELEPHONE ENCOUNTER
Pt called to request an appt for a follow up from [address] [place] [address] on 3/10, pt was seen there for lower back pain, strained her back a week and a half ago. Pt still dealing with pain, was given a cortisone shot, muscle relaxer and anti inflammatory, pt unable to keep taking anti inflammatory, makes her feel weird. PCP unavailable, pt would like a call back concerning this matter.

## 2021-03-23 ENCOUNTER — OFFICE VISIT (OUTPATIENT)
Dept: PRIMARY CARE CLINIC | Age: 51
End: 2021-03-23
Payer: COMMERCIAL

## 2021-03-23 VITALS
HEIGHT: 63 IN | WEIGHT: 129 LBS | TEMPERATURE: 97.7 F | SYSTOLIC BLOOD PRESSURE: 130 MMHG | HEART RATE: 90 BPM | DIASTOLIC BLOOD PRESSURE: 64 MMHG | OXYGEN SATURATION: 99 % | BODY MASS INDEX: 22.86 KG/M2

## 2021-03-23 DIAGNOSIS — M54.50 ACUTE BILATERAL LOW BACK PAIN WITHOUT SCIATICA: Primary | ICD-10-CM

## 2021-03-23 PROCEDURE — G8427 DOCREV CUR MEDS BY ELIG CLIN: HCPCS | Performed by: FAMILY MEDICINE

## 2021-03-23 PROCEDURE — 1036F TOBACCO NON-USER: CPT | Performed by: FAMILY MEDICINE

## 2021-03-23 PROCEDURE — 99213 OFFICE O/P EST LOW 20 MIN: CPT | Performed by: FAMILY MEDICINE

## 2021-03-23 PROCEDURE — G8420 CALC BMI NORM PARAMETERS: HCPCS | Performed by: FAMILY MEDICINE

## 2021-03-23 PROCEDURE — G8484 FLU IMMUNIZE NO ADMIN: HCPCS | Performed by: FAMILY MEDICINE

## 2021-03-23 RX ORDER — LIDOCAINE 50 MG/G
OINTMENT TOPICAL
Qty: 50 G | Refills: 2 | Status: SHIPPED
Start: 2021-03-23 | End: 2021-06-29 | Stop reason: SDUPTHER

## 2021-03-23 ASSESSMENT — ENCOUNTER SYMPTOMS
WHEEZING: 0
COUGH: 0
ABDOMINAL PAIN: 0
CONSTIPATION: 0
NAUSEA: 0
DIARRHEA: 0
BACK PAIN: 1
SHORTNESS OF BREATH: 0
VOMITING: 0

## 2021-03-23 NOTE — PROGRESS NOTES
3/23/21  Emmanuelle Corcoran : 1970 Sex: female  Age: 52 y.o. Chief Complaint   Patient presents with    Lower Back Pain     present for 3 weeks      HPI:  52 y.o. female presents for acute visit due to back pain. Back Pain  52 y.o. female presents today with complaint of back pain. The pain started 3 week(s) ago. The patient denies any trauma or injury. She was seen through [facility] ago and given NSAIDs and muscle relaxers. She had to stop taking because they upset her stomach. The patient has been using [employer] and heating pad at home with good relief of their symptoms. She notes that her pain is now \"85-90%\" better. She denies numbness, tingling or weakness to the extremities. She denies any saddle anesthesia. No bowel or bladder incontinence. No fever or chills. No dysuria, urinary, frequency, or gross hematuria. No abdominal pain, nausea, vomiting, or diarrhea. No history of kidney stones. ROS:  Review of Systems   Constitutional: Negative for chills, fatigue and fever. Respiratory: Negative for cough, shortness of breath and wheezing. Cardiovascular: Negative for chest pain and palpitations. Gastrointestinal: Negative for abdominal pain, constipation, diarrhea, nausea and vomiting. Musculoskeletal: Positive for back pain. Negative for arthralgias. Skin: Negative for rash. Neurological: Negative for dizziness and headaches. Psychiatric/Behavioral: Negative for dysphoric mood. The patient is not nervous/anxious. All other systems reviewed and are negative.        Current Outpatient Medications on File Prior to Visit   Medication Sig Dispense Refill    FLUoxetine (PROZAC) 20 MG capsule take 1 capsule by mouth three times a day 90 capsule 5    clarithromycin (BIAXIN) 250 MG tablet Take 250 mg by mouth daily      Cholecalciferol (VITAMIN D3) 25 MCG (1000 UT) CAPS Take by mouth      Turmeric Curcumin 500 MG CAPS Take by mouth      Multiple Vitamins-Minerals (ONE-A-DAY WOMENS PO) Take by mouth      Biotin 1000 MCG CHEW Take by mouth      SYMBICORT 160-4.5 MCG/ACT AERO inhale 1 to 2 puffs by mouth every 12 hours Rinse mouth after use      fluticasone (FLONASE) 50 MCG/ACT nasal spray instill 1 to 2 sprays into each nostril every morning      levothyroxine (SYNTHROID) 100 MCG tablet take 1 tablet by mouth once daily      montelukast (SINGULAIR) 10 MG tablet take 1 tablet by mouth at bedtime      RAMILA 1/20 1-20 MG-MCG per tablet take 1 tablet by mouth once daily as directed      Probiotic Product (PROBIOTIC DAILY PO) Take by mouth LD 4/6/16      Omega-3 Fatty Acids (FISH OIL PO) Take by mouth LD 4/6/16      azelastine (ASTELIN) 137 MCG/SPRAY nasal spray 2 sprays by Nasal route nightly. Use in each nostril as directed       oxaprozin (DAYPRO) 600 MG tablet Take 1 tablet by mouth 2 times daily for 7 days Take on full stomach and with a full glass of water 14 tablet 0     No current facility-administered medications on file prior to visit. No Known Allergies    Past Medical History:   Diagnosis Date    Anxiety     Common variable immunodeficiency (HCC)     FU with Dr. Kim Helton, no recent issues, stable     Environmental allergies     Kidney stone     Thyroid disease      Past Surgical History:   Procedure Laterality Date    COLONOSCOPY  04/11/2016    LITHOTRIPSY  2019    UPPER GASTROINTESTINAL ENDOSCOPY  04/11/2016    WISDOM TOOTH EXTRACTION       History reviewed. No pertinent family history. Social History     Tobacco History     Smoking Status  Never Smoker    Smokeless Tobacco Use  Never Used                 Vitals:    03/23/21 1403   BP: 130/64   Pulse: 90   Temp: 97.7 °F (36.5 °C)   SpO2: 99%   Weight: 129 lb (58.5 kg)   Height: 5' 3\" (1.6 m)       Physical Exam:  Physical Exam  Vitals signs and nursing note reviewed. Constitutional:       General: She is not in acute distress. Appearance: Normal appearance.  She is well-developed and normal weight. She is not ill-appearing. HENT:      Head: Normocephalic and atraumatic. Right Ear: Hearing and external ear normal.      Left Ear: Hearing and external ear normal.      Nose:      Comments: Wearing mask  Eyes:      General: Lids are normal. No scleral icterus. Extraocular Movements: Extraocular movements intact. Conjunctiva/sclera: Conjunctivae normal.   Neck:      Musculoskeletal: Normal range of motion and neck supple. Thyroid: No thyromegaly. Cardiovascular:      Rate and Rhythm: Normal rate and regular rhythm. Heart sounds: Normal heart sounds. No murmur. Pulmonary:      Effort: Pulmonary effort is normal. No respiratory distress. Breath sounds: Normal breath sounds. No wheezing. Musculoskeletal: Normal range of motion. General: No tenderness or deformity. Right lower leg: No edema. Left lower leg: No edema. Lymphadenopathy:      Cervical: No cervical adenopathy. Skin:     General: Skin is warm and dry. Findings: No rash. Neurological:      General: No focal deficit present. Mental Status: She is alert and oriented to person, place, and time. Gait: Gait normal.   Psychiatric:         Mood and Affect: Affect normal. Mood is anxious. Speech: Speech normal.         Behavior: Behavior normal.         Thought Content: Thought content normal.          Assessment and Plan:  Eli Haque was seen today for lower back pain. Diagnoses and all orders for this visit:    Acute bilateral low back pain without sciatica  -     XR LUMBAR SPINE (MIN 4 VIEWS); Future    Xrays reviewed in the office today with the patient. No acute findings. There was evidence of loss of lumbar lordosis to suggest muscle spasm and end plate narrowing between L2-L3. Discussed continuing heat and stretching. Exercises given. Return if symptoms worsen or fail to improve.       Seen By:  Yvette Samson DO

## 2021-03-23 NOTE — PATIENT INSTRUCTIONS
Patient Education        Low Back Pain: Exercises  Introduction  Here are some examples of exercises for you to try. The exercises may be suggested for a condition or for rehabilitation. Start each exercise slowly. Ease off the exercises if you start to have pain. You will be told when to start these exercises and which ones will work best for you. How to do the exercises  Press-up   1. Lie on your stomach, supporting your body with your forearms. 2. Press your elbows down into the floor to raise your upper back. As you do this, relax your stomach muscles and allow your back to arch without using your back muscles. As your press up, do not let your hips or pelvis come off the floor. 3. Hold for 15 to 30 seconds, then relax. 4. Repeat 2 to 4 times. Alternate arm and leg (bird dog) exercise   Do this exercise slowly. Try to keep your body straight at all times, and do not let one hip drop lower than the other. 1. Start on the floor, on your hands and knees. 2. Tighten your belly muscles. 3. Raise one leg off the floor, and hold it straight out behind you. Be careful not to let your hip drop down, because that will twist your trunk. 4. Hold for about 6 seconds, then lower your leg and switch to the other leg. 5. Repeat 8 to 12 times on each leg. 6. Over time, work up to holding for 10 to 30 seconds each time. 7. If you feel stable and secure with your leg raised, try raising the opposite arm straight out in front of you at the same time. Knee-to-chest exercise   1. Lie on your back with your knees bent and your feet flat on the floor. 2. Bring one knee to your chest, keeping the other foot flat on the floor (or keeping the other leg straight, whichever feels better on your lower back). 3. Keep your lower back pressed to the floor. Hold for at least 15 to 30 seconds. 4. Relax, and lower the knee to the starting position. 5. Repeat with the other leg. Repeat 2 to 4 times with each leg.   6. To get more stretch, put your other leg flat on the floor while pulling your knee to your chest.    Curl-ups   1. Lie on the floor on your back with your knees bent at a 90-degree angle. Your feet should be flat on the floor, about 12 inches from your buttocks. 2. Cross your arms over your chest. If this bothers your neck, try putting your hands behind your neck (not your head), with your elbows spread apart. 3. Slowly tighten your belly muscles and raise your shoulder blades off the floor. 4. Keep your head in line with your body, and do not press your chin to your chest.  5. Hold this position for 1 or 2 seconds, then slowly lower yourself back down to the floor. 6. Repeat 8 to 12 times. Pelvic tilt exercise   1. Lie on your back with your knees bent. 2. \"Brace\" your stomach. This means to tighten your muscles by pulling in and imagining your belly button moving toward your spine. You should feel like your back is pressing to the floor and your hips and pelvis are rocking back. 3. Hold for about 6 seconds while you breathe smoothly. 4. Repeat 8 to 12 times. Heel dig bridging   1. Lie on your back with both knees bent and your ankles bent so that only your heels are digging into the floor. Your knees should be bent about 90 degrees. 2. Then push your heels into the floor, squeeze your buttocks, and lift your hips off the floor until your shoulders, hips, and knees are all in a straight line. 3. Hold for about 6 seconds as you continue to breathe normally, and then slowly lower your hips back down to the floor and rest for up to 10 seconds. 4. Do 8 to 12 repetitions. Hamstring stretch in doorway   1. Lie on your back in a doorway, with one leg through the open door. 2. Slide your leg up the wall to straighten your knee. You should feel a gentle stretch down the back of your leg. 3. Hold the stretch for at least 15 to 30 seconds. Do not arch your back, point your toes, or bend either knee.  Keep one heel touching the floor and the other heel touching the wall. 4. Repeat with your other leg. 5. Do 2 to 4 times for each leg. Hip flexor stretch   1. Kneel on the floor with one knee bent and one leg behind you. Place your forward knee over your foot. Keep your other knee touching the floor. 2. Slowly push your hips forward until you feel a stretch in the upper thigh of your rear leg. 3. Hold the stretch for at least 15 to 30 seconds. Repeat with your other leg. 4. Do 2 to 4 times on each side. Wall sit   1. Stand with your back 10 to 12 inches away from a wall. 2. Lean into the wall until your back is flat against it. 3. Slowly slide down until your knees are slightly bent, pressing your lower back into the wall. 4. Hold for about 6 seconds, then slide back up the wall. 5. Repeat 8 to 12 times. Follow-up care is a key part of your treatment and safety. Be sure to make and go to all appointments, and call your doctor if you are having problems. It's also a good idea to know your test results and keep a list of the medicines you take. Where can you learn more? Go to [email or web address] and sign in to your [employer] account. Enter T005 in the [facility] box to learn more about \"Low Back Pain: Exercises. \"     If you do not have an account, please click on the \"Sign Up Now\" link. Current as of: November 16, 2020               Content Version: 12.8  © 2006-2021 Healthwise, Incorporated. Care instructions adapted under license by [facility] ([facility]). If you have questions about a medical condition or this instruction, always ask your healthcare professional. [place] [address] any warranty or liability for your use of this information.

## 2021-03-25 ENCOUNTER — TELEPHONE (OUTPATIENT)
Dept: PRIMARY CARE CLINIC | Age: 51
End: 2021-03-25

## 2021-03-25 DIAGNOSIS — M54.50 ACUTE BILATERAL LOW BACK PAIN WITHOUT SCIATICA: Primary | ICD-10-CM

## 2021-03-25 NOTE — TELEPHONE ENCOUNTER
It has only been 2 days. It is going to take time.   If she wants to go to physical therapy, I'd be happy to refer her to a place

## 2021-03-25 NOTE — TELEPHONE ENCOUNTER
Pt wants Dr Hines Spine to know her back is not feeling better since her 3/23 appt;  did not want to sched first available appt 4/8.

## 2021-04-08 ENCOUNTER — HOSPITAL ENCOUNTER (OUTPATIENT)
Dept: PHYSICAL THERAPY | Age: 51
Setting detail: THERAPIES SERIES
Discharge: HOME | End: 2021-04-08
Payer: COMMERCIAL

## 2021-04-08 PROCEDURE — 97161 PT EVAL LOW COMPLEX 20 MIN: CPT
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
## 2021-04-09 DIAGNOSIS — E03.9 ACQUIRED HYPOTHYROIDISM: ICD-10-CM

## 2021-04-09 DIAGNOSIS — E55.9 VITAMIN D INSUFFICIENCY: ICD-10-CM

## 2021-04-09 DIAGNOSIS — R73.01 IMPAIRED FASTING GLUCOSE: ICD-10-CM

## 2021-04-09 LAB
ALBUMIN SERPL-MCNC: 4 G/DL (ref 3.5–5.2)
ALP BLD-CCNC: 46 U/L (ref 35–104)
ALT SERPL-CCNC: 18 U/L (ref 0–32)
ANION GAP SERPL CALCULATED.3IONS-SCNC: 8 MMOL/L (ref 7–16)
AST SERPL-CCNC: 21 U/L (ref 0–31)
BACTERIA: ABNORMAL /HPF
BASOPHILS ABSOLUTE: 0.04 E9/L (ref 0–0.2)
BASOPHILS RELATIVE PERCENT: 0.6 % (ref 0–2)
BILIRUB SERPL-MCNC: 0.4 MG/DL (ref 0–1.2)
BILIRUBIN URINE: NEGATIVE
BLOOD, URINE: NEGATIVE
BUN BLDV-MCNC: 16 MG/DL (ref 6–20)
CALCIUM SERPL-MCNC: 9.4 MG/DL (ref 8.6–10.2)
CHLORIDE BLD-SCNC: 103 MMOL/L (ref 98–107)
CHOLESTEROL, TOTAL: 176 MG/DL (ref 0–199)
CLARITY: ABNORMAL
CO2: 27 MMOL/L (ref 22–29)
COLOR: YELLOW
CREAT SERPL-MCNC: 0.9 MG/DL (ref 0.5–1)
CRYSTALS, UA: ABNORMAL /HPF
EOSINOPHILS ABSOLUTE: 0.07 E9/L (ref 0.05–0.5)
EOSINOPHILS RELATIVE PERCENT: 1 % (ref 0–6)
GFR AFRICAN AMERICAN: >60
GFR NON-AFRICAN AMERICAN: >60 ML/MIN/1.73
GLUCOSE BLD-MCNC: 90 MG/DL (ref 74–99)
GLUCOSE URINE: NEGATIVE MG/DL
HBA1C MFR BLD: 5 % (ref 4–5.6)
HCT VFR BLD CALC: 47.1 % (ref 34–48)
HDLC SERPL-MCNC: 103 MG/DL
HEMOGLOBIN: 15.1 G/DL (ref 11.5–15.5)
IMMATURE GRANULOCYTES #: 0.03 E9/L
IMMATURE GRANULOCYTES %: 0.4 % (ref 0–5)
KETONES, URINE: NEGATIVE MG/DL
LDL CHOLESTEROL CALCULATED: 64 MG/DL (ref 0–99)
LEUKOCYTE ESTERASE, URINE: NEGATIVE
LYMPHOCYTES ABSOLUTE: 1.98 E9/L (ref 1.5–4)
LYMPHOCYTES RELATIVE PERCENT: 27.7 % (ref 20–42)
MCH RBC QN AUTO: 29.6 PG (ref 26–35)
MCHC RBC AUTO-ENTMCNC: 32.1 % (ref 32–34.5)
MCV RBC AUTO: 92.4 FL (ref 80–99.9)
MONOCYTES ABSOLUTE: 0.32 E9/L (ref 0.1–0.95)
MONOCYTES RELATIVE PERCENT: 4.5 % (ref 2–12)
NEUTROPHILS ABSOLUTE: 4.72 E9/L (ref 1.8–7.3)
NEUTROPHILS RELATIVE PERCENT: 65.8 % (ref 43–80)
NITRITE, URINE: NEGATIVE
PDW BLD-RTO: 11.9 FL (ref 11.5–15)
PH UA: 5.5 (ref 5–9)
PLATELET # BLD: 252 E9/L (ref 130–450)
PMV BLD AUTO: 9.6 FL (ref 7–12)
POTASSIUM SERPL-SCNC: 3.8 MMOL/L (ref 3.5–5)
PROTEIN UA: NEGATIVE MG/DL
RBC # BLD: 5.1 E12/L (ref 3.5–5.5)
RBC UA: ABNORMAL /HPF (ref 0–2)
SODIUM BLD-SCNC: 138 MMOL/L (ref 132–146)
SPECIFIC GRAVITY UA: >=1.03 (ref 1–1.03)
T4 FREE: 1.39 NG/DL (ref 0.93–1.7)
TOTAL PROTEIN: 6.7 G/DL (ref 6.4–8.3)
TRIGL SERPL-MCNC: 46 MG/DL (ref 0–149)
TSH SERPL DL<=0.05 MIU/L-ACNC: 1.54 UIU/ML (ref 0.27–4.2)
UROBILINOGEN, URINE: 0.2 E.U./DL
VITAMIN D 25-HYDROXY: 102 NG/ML (ref 30–100)
VLDLC SERPL CALC-MCNC: 9 MG/DL
WBC # BLD: 7.2 E9/L (ref 4.5–11.5)
WBC UA: ABNORMAL /HPF (ref 0–5)

## 2021-04-09 NOTE — FLOWSHEET NOTE
[facility]  Phone: [phone] Fax: [phone]     Physical Therapy  Out Patient Initial Evaluation    Date:  2021    Patient Name:  Santos Devine    :  1970  MRN: [id number]    DIAGNOSIS:  Back Pain   EVALUATION DATE:  2021  REFERRING PHYSICIAN:  Dr Ceballos Korey:  2021    PROBLEMS FOUND DURING EVALUATION  · Pain: Affects the lower lumbar region   · Postural deficits  · Deficits of ROM/Mobility trunk and hips    SHORT TERM GOALS  · Patient will be able to engage in consistent active exercise while reporting no increase in back pain symptoms   · Establish HEP    LONG TERM GOALS  · Patient will be able to maintain and sustain ADL's while effectively controlling back pain at 2/10 or less  · Postural awareness Fair to Fair+   AROM will be WFL's all ranges  · Patient will be able to maintain and self direct an appropriate follow up independent exercise program     PATIENT GOALS  · Control back pain     REHAB POTENTIAL:  Fair+    FREQUENCY/DURATION:  1-2 times per week 4 weeks     PLAN OF CARE:  Progressive exercise Postural education Modalities as needed HEP       Thank you for the opportunity to work with your patient. If you have questions or comments, please feel free to contact me by phone or fax.       Electronically Signed by Noman Tatum PT [id number]        ___________________________________  2021    Physician     Date

## 2021-05-03 ENCOUNTER — OFFICE VISIT (OUTPATIENT)
Dept: FAMILY MEDICINE CLINIC | Age: 51
End: 2021-05-03
Payer: COMMERCIAL

## 2021-05-03 VITALS
TEMPERATURE: 97.5 F | BODY MASS INDEX: 23.57 KG/M2 | DIASTOLIC BLOOD PRESSURE: 76 MMHG | RESPIRATION RATE: 18 BRPM | OXYGEN SATURATION: 98 % | WEIGHT: 133 LBS | HEIGHT: 63 IN | SYSTOLIC BLOOD PRESSURE: 126 MMHG | HEART RATE: 102 BPM

## 2021-05-03 DIAGNOSIS — M79.642 LEFT HAND PAIN: Primary | ICD-10-CM

## 2021-05-03 PROCEDURE — G8427 DOCREV CUR MEDS BY ELIG CLIN: HCPCS | Performed by: PHYSICIAN ASSISTANT

## 2021-05-03 PROCEDURE — 1036F TOBACCO NON-USER: CPT | Performed by: PHYSICIAN ASSISTANT

## 2021-05-03 PROCEDURE — G8420 CALC BMI NORM PARAMETERS: HCPCS | Performed by: PHYSICIAN ASSISTANT

## 2021-05-03 PROCEDURE — 99214 OFFICE O/P EST MOD 30 MIN: CPT | Performed by: PHYSICIAN ASSISTANT

## 2021-05-03 NOTE — PROGRESS NOTES
5/3/21  Jane White : 1970 Sex: female  Age 52 y.o. Subjective:  Chief Complaint   Patient presents with    Hand Pain     left          HPI:   Jane White , 52 y.o. female presents to [facility] care for evaluation of left hand pain and swelling. The patient has noted this area noted to the thenar eminence of the left hand for the last week or so. The patient is unsure of any specific injury. No falls. The patient is not having any pain into the left wrist.  The patient is right-hand dominant. The patient denies any other injuries or complaints currently. ROS:   Unless otherwise stated in this report the patient's positive and negative responses for review of systems for constitutional, eyes, ENT, cardiovascular, respiratory, gastrointestinal, neurological, , musculoskeletal, and integument systems and related systems to the presenting problem are either stated in the history of present illness or were not pertinent or were negative for the symptoms and/or complaints related to the presenting medical problem. Positives and pertinent negatives as per HPI. All others reviewed and are negative. PMH:     Past Medical History:   Diagnosis Date    Anxiety     Common variable immunodeficiency ([facility] [address]     FU with Dr. Rica Burrows, no recent issues, stable     Environmental allergies     Kidney stone     Thyroid disease        Past Surgical History:   Procedure Laterality Date    COLONOSCOPY  2016    LITHOTRIPSY  2019    UPPER GASTROINTESTINAL ENDOSCOPY  2016    WISDOM TOOTH EXTRACTION         No family history on file. Medications:     Current Outpatient Medications:     lidocaine (XYLOCAINE) 5 % ointment, Apply topically as needed. , Disp: 50 g, Rfl: 2    FLUoxetine (PROZAC) 20 MG capsule, take 1 capsule by mouth three times a day, Disp: 90 capsule, Rfl: 5    clarithromycin (BIAXIN) 250 MG tablet, Take 250 mg by mouth daily, Disp: , Rfl:    Cholecalciferol (VITAMIN D3) 25 MCG (1000 UT) CAPS, Take by mouth, Disp: , Rfl:     Turmeric Curcumin 500 MG CAPS, Take by mouth, Disp: , Rfl:     Multiple Vitamins-Minerals (ONE-A-DAY WOMENS PO), Take by mouth, Disp: , Rfl:     Biotin 1000 MCG CHEW, Take by mouth, Disp: , Rfl:     SYMBICORT 160-4.5 MCG/ACT AERO, inhale 1 to 2 puffs by mouth every 12 hours Rinse mouth after use, Disp: , Rfl:     fluticasone (FLONASE) 50 MCG/ACT nasal spray, instill 1 to 2 sprays into each nostril every morning, Disp: , Rfl:     levothyroxine (SYNTHROID) 100 MCG tablet, take 1 tablet by mouth once daily, Disp: , Rfl:     montelukast (SINGULAIR) 10 MG tablet, take 1 tablet by mouth at bedtime, Disp: , Rfl:     RAMILA 1/20 1-20 MG-MCG per tablet, take 1 tablet by mouth once daily as directed, Disp: , Rfl:     Probiotic Product (PROBIOTIC DAILY PO), Take by mouth LD 4/6/16, Disp: , Rfl:     Omega-3 Fatty Acids (FISH OIL PO), Take by mouth LD 4/6/16, Disp: , Rfl:     azelastine (ASTELIN) 137 MCG/SPRAY nasal spray, 2 sprays by Nasal route nightly. Use in each nostril as directed , Disp: , Rfl:     Allergies: Allergies   Allergen Reactions    Prednisone        Social History:     Social History     Tobacco Use    Smoking status: Never Smoker    Smokeless tobacco: Never Used   Substance Use Topics    Alcohol use: No    Drug use: No       Patient lives at home. Physical Exam:     Vitals:    05/03/21 1531   BP: 126/76   Pulse: 102   Resp: 18   Temp: 97.5 °F (36.4 °C)   SpO2: 98%   Weight: 133 lb (60.3 kg)   Height: 5' 3\" (1.6 m)       Exam:  Physical Exam  Vital signs reviewed and nurse's notes. The patient is not hypoxic. General: Alert, no acute distress, patient resting comfortably   Skin: warm, intact, no pallor noted   Head: Normocephalic, atraumatic   Eye: Normal conjunctiva   Respiratory: No acute distress   Musculoskeletal: No obvious deformity noted to the left hand over the left upper extremity.   The patient has no significant swelling. There is a palpable nodule noted on the radial aspect of the thenar eminence on the volar side of the hand. There is no involvement to the dorsal aspect of the hand. There is no erythema, warmth. There is no ecchymosis. The patient has no other areas of tenderness. No deficits of the index, middle, ring or little finger. Patient had pulses intact. Neurological: alert and orient x4, normal sensory and motor observed. Psychiatric: Cooperative      Testing:     Xr Hand Left (min 3 Views)    Result Date: 5/4/2021  EXAMINATION: THREE XRAY VIEWS OF THE LEFT HAND 5/3/2021 3:56 pm COMPARISON: None. HISTORY: ORDERING SYSTEM PROVIDED HISTORY: Left hand pain TECHNOLOGIST PROVIDED HISTORY: Reason for exam:->left hand pain FINDINGS: No radiopaque foreign body. Joint spaces are preserved. No bony destruction, dislocation or acute fracture identified. No acute bony abnormality. Medical Decision Making:     Vital signs reviewed    Past medical history reviewed. Allergies reviewed. Medications reviewed. Patient on arrival does not appear to be in any apparent distress or discomfort. The patient had x-rays obtained in the office today and formal radiology report is pending at this time. I personally reviewed the x-ray images and did not see any evidence of acute process. The patient did not want a brace. The patient did not want any prescription medications. She does not do with well with pills. The patient will be treated with Voltaren gel     We did discuss the potential of occult fracture with the patient and the need for followup. The patient understands the need for follow-up and repeat evaluation. The patient was educated on RICE therapy, nsaids, and tylenol. The patient is to return if any of the signs or symptoms worsen.  The patient is to follow-up with PCP in the next 2-3 days for repeat evaluation repeat assessment or go directly to the emergency department. Clinical Impression:   [facility] was [facility] for hand pain. Diagnoses and all orders for this visit:    Left hand pain  -     XR HAND LEFT (MIN 3 VIEWS); Future    Other orders  -     diclofenac sodium (VOLTAREN) 1 % GEL; Apply topically 2 times daily        The patient is to call for any concerns or return if any of the signs or symptoms worsen. The patient is to follow-up with PCP in the next 2-3 days for repeat evaluation repeat assessment or go directly to the emergency department.      SIGNATURE: Xiao Caba III, PA-C

## 2021-05-27 ENCOUNTER — HOSPITAL ENCOUNTER (OUTPATIENT)
Dept: PHYSICAL THERAPY | Age: 51
Setting detail: THERAPIES SERIES
Discharge: HOME | End: 2021-05-27
Payer: COMMERCIAL

## 2021-05-27 PROCEDURE — G0283 ELEC STIM OTHER THAN WOUND: HCPCS

## 2021-05-27 PROCEDURE — 97110 THERAPEUTIC EXERCISES: CPT

## 2021-05-27 NOTE — PROGRESS NOTES
[facility]  Phone: [phone] Fax: [phone]       Physical Therapy Daily Treatment Note  Date:  2021    Patient Name:  Gadiel Ward    :  1970  MRN: [id number]    Restrictions/Precautions:    Diagnosis:  Back Pain   Treatment Diagnosis:    Insurance/Certification information: [employer]    Referring Physician: Dr Lincoln Acuna of care signed (Y/N):    Visit# / total visits:    Pain level: /10  Time In:    930    Time Out:          Subjective:      Exercises:  Exercise/Equipment Resistance/Repetitions Other comments     Bike 10 min              Prayer str 10 reps       Cat Str 15 reps       Supine IT IT Band str  10 reps bilateral      Piriformis str   10 reps bilateral      Bridge  10 reps      Isometric Hip Flex  10 reps                                                                                       Other Therapeutic Activities:      Home Exercise Program:      Manual Treatments:      Modalities:      Timed Code Treatment Minutes:  30    Total Treatment Minutes:  40    Treatment/Activity Tolerance:  [x] Patient tolerated treatment well [] Patient limited by fatigue  [] Patient limited by pain  [] Patient limited by other medical complications  [] Other:     Plan:   [x] Continue per plan of care [] Alter current plan (see comments)  [] Plan of care initiated [] Hold pending MD visit [] Discharge  Plan for Next Session:         Treatment Charges: Mins Units   Initial Evaluation     Re-Evaluation     Ther Exercise         TE 30 1   Manual Therapy     MT     Ther Activities        TA     Gait Training          GT     Neuro Re-education NR     Modalities     Non-Billable Service Time 10    Other     Total Time/Units 40 2     Electronically signed by:  Erma Robin, [address] [place] [address]

## 2021-06-01 ENCOUNTER — HOSPITAL ENCOUNTER (OUTPATIENT)
Dept: PHYSICAL THERAPY | Age: 51
Setting detail: THERAPIES SERIES
Discharge: HOME | End: 2021-06-01
Payer: COMMERCIAL

## 2021-06-01 NOTE — PROGRESS NOTES
[facility]  Phone: [phone] Fax: [phone]     Physical Therapy  Cancellation/No-show Note  Patient Name:  Radha Maldonado  :  1970   Date:  2021    For today's appointment patient:  [x]  Cancelled  []  Rescheduled appointment  []  No-show     Reason given by patient:  [x]  Patient ill  []  Conflicting appointment  []  No transportation    []  Conflict with work  []  No reason given  [x]  Other:     Comments:  Aggravation of Allergies                                     Next PT session 2021    Electronically signed by:  Azucena Coello, [address] [facility]

## 2021-06-04 ENCOUNTER — HOSPITAL ENCOUNTER (OUTPATIENT)
Dept: PHYSICAL THERAPY | Age: 51
Setting detail: THERAPIES SERIES
End: 2021-06-04
Payer: COMMERCIAL

## 2021-06-04 ENCOUNTER — OFFICE VISIT (OUTPATIENT)
Dept: FAMILY MEDICINE CLINIC | Age: 51
End: 2021-06-04
Payer: COMMERCIAL

## 2021-06-04 VITALS
DIASTOLIC BLOOD PRESSURE: 82 MMHG | OXYGEN SATURATION: 98 % | WEIGHT: 130 LBS | HEIGHT: 63 IN | RESPIRATION RATE: 18 BRPM | BODY MASS INDEX: 23.04 KG/M2 | TEMPERATURE: 97.6 F | HEART RATE: 101 BPM | SYSTOLIC BLOOD PRESSURE: 128 MMHG

## 2021-06-04 DIAGNOSIS — R09.82 POSTNASAL DRIP: ICD-10-CM

## 2021-06-04 DIAGNOSIS — R09.81 NASAL CONGESTION: ICD-10-CM

## 2021-06-04 DIAGNOSIS — R07.0 PAIN IN THROAT: ICD-10-CM

## 2021-06-04 DIAGNOSIS — R05.9 COUGH: ICD-10-CM

## 2021-06-04 DIAGNOSIS — J01.90 ACUTE NON-RECURRENT SINUSITIS, UNSPECIFIED LOCATION: Primary | ICD-10-CM

## 2021-06-04 PROCEDURE — 1036F TOBACCO NON-USER: CPT | Performed by: PHYSICIAN ASSISTANT

## 2021-06-04 PROCEDURE — G8420 CALC BMI NORM PARAMETERS: HCPCS | Performed by: PHYSICIAN ASSISTANT

## 2021-06-04 PROCEDURE — 99213 OFFICE O/P EST LOW 20 MIN: CPT | Performed by: PHYSICIAN ASSISTANT

## 2021-06-04 PROCEDURE — G8427 DOCREV CUR MEDS BY ELIG CLIN: HCPCS | Performed by: PHYSICIAN ASSISTANT

## 2021-06-04 RX ORDER — CEFDINIR 300 MG/1
300 CAPSULE ORAL 2 TIMES DAILY
Qty: 20 CAPSULE | Refills: 0 | Status: SHIPPED | OUTPATIENT
Start: 2021-06-04 | End: 2021-06-14

## 2021-06-04 NOTE — PROGRESS NOTES
21  Denver Antigua : 1970 Sex: female  Age 52 y.o. Subjective:  Chief Complaint   Patient presents with    Sinus Problem         HPI:   Denver Antigua , 52 y.o. female presents to express care for evaluation of sinus congestion, rhinorrhea, low-grade fever. The patient has had the symptoms ongoing since . The patient called her allergist, Dr. Marlinda Nageotte and they called in a prescription for azithromycin. They recommended the patient continue with her multiple nasal sprays. The patient is also taking a decongestant. The patient states that despite taking all these things today is not really improved. She has noted a T-max of 99.1. The patient has previously had Covid vaccine back in March. The patient did have a Covid test 2 days ago that was negative. The patient does not have any chest pain, shortness of breath. The sinus symptoms do not seem to be improving despite her stating that the [employer] usually works for her. ROS:   Unless otherwise stated in this report the patient's positive and negative responses for review of systems for constitutional, eyes, ENT, cardiovascular, respiratory, gastrointestinal, neurological, , musculoskeletal, and integument systems and related systems to the presenting problem are either stated in the history of present illness or were not pertinent or were negative for the symptoms and/or complaints related to the presenting medical problem. Positives and pertinent negatives as per HPI. All others reviewed and are negative.       PMH:     Past Medical History:   Diagnosis Date    Anxiety     Common variable immunodeficiency ([facility] [address]     FU with Dr. Becca Zepeda, no recent issues, stable     Environmental allergies     Kidney stone     Thyroid disease        Past Surgical History:   Procedure Laterality Date    COLONOSCOPY  2016    LITHOTRIPSY  2019    UPPER GASTROINTESTINAL ENDOSCOPY  2016    WISDOM TOOTH EXTRACTION History reviewed. No pertinent family history. Medications:     Current Outpatient Medications:     cefdinir (OMNICEF) 300 MG capsule, Take 1 capsule by mouth 2 times daily for 10 days, Disp: 20 capsule, Rfl: 0    diclofenac sodium (VOLTAREN) 1 % GEL, Apply topically 2 times daily, Disp: 100 g, Rfl: 0    lidocaine (XYLOCAINE) 5 % ointment, Apply topically as needed. , Disp: 50 g, Rfl: 2    FLUoxetine (PROZAC) 20 MG capsule, take 1 capsule by mouth three times a day, Disp: 90 capsule, Rfl: 5    clarithromycin (BIAXIN) 250 MG tablet, Take 250 mg by mouth daily, Disp: , Rfl:     Cholecalciferol (VITAMIN D3) 25 MCG (1000 UT) CAPS, Take by mouth, Disp: , Rfl:     Turmeric Curcumin 500 MG CAPS, Take by mouth, Disp: , Rfl:     Multiple Vitamins-Minerals (ONE-A-DAY WOMENS PO), Take by mouth, Disp: , Rfl:     Biotin 1000 MCG CHEW, Take by mouth, Disp: , Rfl:     SYMBICORT 160-4.5 MCG/ACT AERO, inhale 1 to 2 puffs by mouth every 12 hours Rinse mouth after use, Disp: , Rfl:     fluticasone (FLONASE) 50 MCG/ACT nasal spray, instill 1 to 2 sprays into each nostril every morning, Disp: , Rfl:     levothyroxine (SYNTHROID) 100 MCG tablet, take 1 tablet by mouth once daily, Disp: , Rfl:     montelukast (SINGULAIR) 10 MG tablet, take 1 tablet by mouth at bedtime, Disp: , Rfl:     RAMILA 1/20 1-20 MG-MCG per tablet, take 1 tablet by mouth once daily as directed, Disp: , Rfl:     Probiotic Product (PROBIOTIC DAILY PO), Take by mouth LD 4/6/16, Disp: , Rfl:     Omega-3 Fatty Acids (FISH OIL PO), Take by mouth LD 4/6/16, Disp: , Rfl:     azelastine (ASTELIN) 137 MCG/SPRAY nasal spray, 2 sprays by Nasal route nightly. Use in each nostril as directed , Disp: , Rfl:     Allergies: Allergies   Allergen Reactions    Prednisone        Social History:     Social History     Tobacco Use    Smoking status: Never Smoker    Smokeless tobacco: Never Used   Substance Use Topics    Alcohol use: No    Drug use:  No Patient lives at home. Physical Exam:     Vitals:    06/04/21 1038   BP: 128/82   Pulse: 101   Resp: 18   Temp: 97.6 °F (36.4 °C)   SpO2: 98%   Weight: 130 lb (59 kg)   Height: 5' 3\" (1.6 m)       Exam:  Physical Exam  Nurse's notes and vital signs reviewed. The patient is not hypoxic. ? General: Alert, no acute distress, patient resting comfortably Patient is not toxic or lethargic. Skin: Warm, intact, no pallor noted. There is no evidence of rash at this time. Head: Normocephalic, atraumatic  Eye: Normal conjunctiva  Ears, Nose, Throat: Right tympanic membrane clear, left tympanic membrane clear. No drainage or discharge noted. No pre- or post-auricular tenderness, erythema, or swelling noted. Mild nasal congestion, rhinorrhea, no epistaxis, no foreign body  Posterior oropharynx shows erythema and cobblestoning but no evidence of tonsillar hypertrophy, or exudate. the uvula is midline. No trismus or drooling is noted. Moist mucous membranes. Neck: No anterior/posterior lymphadenopathy noted. No erythema, no masses, no fluctuance or induration noted. No meningeal signs. Cardiovascular: Regular Rate and Rhythm  Respiratory: No acute distress, no rhonchi, wheezing or crackles noted. No stridor or retractions are noted. Neurological: A&O x4, normal speech  Psychiatric: Cooperative         Testing:           Medical Decision Making:     Vital signs reviewed    Past medical history reviewed. Allergies reviewed. Medications reviewed. Patient on arrival does not appear to be in any apparent distress or discomfort. The patient has been seen and evaluated. The patient does not appear to be toxic or lethargic. The patient was educated on the proper dosage of motrin and tylenol and the appropriate intervals of each. The patient is to increase fluid intake over the next several days. The patient is to continue her nasal sprays continue with her Biaxin.   We will switch the azithromycin to Omnicef to see if this does not help. She is to use probiotics and yogurt. The patient is to follow-up with her allergist and immunologist.  The patient understands plan has no other questions or concerns. If any of the signs or symptoms worsen go directly to the emergency department. The patient is to return to express care or go directly to the emergency department should any of the signs or symptoms worsen. The patient is to followup with primary care physician in 2-3 days for repeat evaluation. The patient has no other questions or concerns at this time the patient will be discharged home. Clinical Impression:   Edward Ingram was seen today for sinus problem. Diagnoses and all orders for this visit:    Acute non-recurrent sinusitis, unspecified location    Postnasal drip    Nasal congestion    Pain in throat    Cough    Other orders  -     cefdinir (OMNICEF) 300 MG capsule; Take 1 capsule by mouth 2 times daily for 10 days        The patient is to call for any concerns or return if any of the signs or symptoms worsen. The patient is to follow-up with PCP in the next 2-3 days for repeat evaluation repeat assessment or go directly to the emergency department.      SIGNATURE: Crys Dong III, PA-C

## 2021-06-09 ENCOUNTER — HOSPITAL ENCOUNTER (OUTPATIENT)
Dept: PHYSICAL THERAPY | Age: 51
Setting detail: THERAPIES SERIES
Discharge: HOME | End: 2021-06-09
Payer: COMMERCIAL

## 2021-06-09 NOTE — PROGRESS NOTES
[facility]  Phone: [phone] Fax: [phone]     Physical Therapy  Cancellation/No-show Note  Patient Name:  Stephania   :  1970   Date:  2021    For today's appointment patient:  [x]  Cancelled  []  Rescheduled appointment  []  No-show     Reason given by patient:  [x]  Patient ill  []  Conflicting appointment  []  No transportation    []  Conflict with work  []  No reason given  [x]  Other:      Comments:                                      Next PT session patient to call     Electronically signed by:  Jose Armando Harding, [address] [facility]

## 2021-06-16 ENCOUNTER — HOSPITAL ENCOUNTER (OUTPATIENT)
Dept: PHYSICAL THERAPY | Age: 51
Setting detail: THERAPIES SERIES
Discharge: HOME | End: 2021-06-16
Payer: COMMERCIAL

## 2021-06-16 PROCEDURE — 97110 THERAPEUTIC EXERCISES: CPT

## 2021-06-18 ENCOUNTER — HOSPITAL ENCOUNTER (OUTPATIENT)
Dept: PHYSICAL THERAPY | Age: 51
Setting detail: THERAPIES SERIES
Discharge: HOME | End: 2021-06-18
Payer: COMMERCIAL

## 2021-06-18 PROCEDURE — 97110 THERAPEUTIC EXERCISES: CPT

## 2021-06-18 NOTE — PROGRESS NOTES
[facility]  Phone: [phone] Fax: [phone]       Physical Therapy Daily Treatment Note  Date:  2021    Patient Name:  Michelle Phan    :  1970  MRN: [id number]    Restrictions/Precautions:    Diagnosis:  Back Pain   Treatment Diagnosis:    Insurance/Certification information: [employer]    Referring Physician: Dr Lawton Cogan of care signed (Y/N):    Visit# / total visits:  3/6  Pain level: /10  Time In:    1100   Time Out:   1144     Subjective:      Exercises:  Exercise/Equipment Resistance/Repetitions Other comments     Bike 10 min              Prayer str 10 reps       Cat Str 15 reps       Supine IT IT Band str  10 reps bilateral      Piriformis str   10 reps bilateral      Bridge  10 reps      Isometric Hip Flex  10 reps                                                                                       Other Therapeutic Activities:      Home Exercise Program:      Manual Treatments:      Modalities:      Timed Code Treatment Minutes:  30    Total Treatment Minutes:  40    Treatment/Activity Tolerance:  [x] Patient tolerated treatment well [] Patient limited by fatigue  [] Patient limited by pain  [] Patient limited by other medical complications  [] Other:     Plan:   [x] Continue per plan of care [] Alter current plan (see comments)  [] Plan of care initiated [] Hold pending MD visit [] Discharge  Plan for Next Session:         Treatment Charges: Mins Units   Initial Evaluation     Re-Evaluation     Ther Exercise         TE 30 1   Manual Therapy     MT     Ther Activities        TA     Gait Training          GT     Neuro Re-education NR     Modalities     Non-Billable Service Time 10    Other     Total Time/Units 40 2     Electronically signed by:  Romayne Matin, [address] [facility]

## 2021-06-21 ENCOUNTER — HOSPITAL ENCOUNTER (OUTPATIENT)
Dept: PHYSICAL THERAPY | Age: 51
Setting detail: THERAPIES SERIES
Discharge: HOME | End: 2021-06-21
Payer: COMMERCIAL

## 2021-06-21 PROCEDURE — 97110 THERAPEUTIC EXERCISES: CPT

## 2021-06-21 NOTE — PROGRESS NOTES
[facility]  Phone: [phone] Fax: [phone]       Physical Therapy Daily Treatment Note  Date:  2021    Patient Name:  Michelle Phan    :  1970  MRN: [id number]    Restrictions/Precautions:    Diagnosis:  Back Pain   Treatment Diagnosis:    Insurance/Certification information: [employer]    Referring Physician: Dr Lawton Cogan of care signed (Y/N):    Visit# / total visits:    Pain level: /10  Time In:    1000   Time Out:   1050     Subjective:      Exercises:  Exercise/Equipment Resistance/Repetitions Other comments     Bike 10 min              Prayer str 10 reps       Cat Str 15 reps       Supine IT IT Band str  10 reps bilateral      Piriformis str   10 reps bilateral      Bridge  10 reps      Isometric Hip Flex  10 reps                Squat 10 reps        Quadruped Hip/Knee ext  5 reps bilateral       Resisted lateral walk  2 laps                                                        Other Therapeutic Activities:      Home Exercise Program:      Manual Treatments:      Modalities:      Timed Code Treatment Minutes:  30    Total Treatment Minutes:  50    Treatment/Activity Tolerance:  [x] Patient tolerated treatment well [] Patient limited by fatigue  [] Patient limited by pain  [] Patient limited by other medical complications  [] Other:     Plan:   [x] Continue per plan of care [] Alter current plan (see comments)  [] Plan of care initiated [] Hold pending MD visit [] Discharge  Plan for Next Session:         Treatment Charges: Mins Units   Initial Evaluation     Re-Evaluation     Ther Exercise         TE 30 2   Manual Therapy     MT     Ther Activities        TA     Gait Training          GT     Neuro Re-education NR     Modalities     Non-Billable Service Time 20    Other     Total Time/Units 50 2     Electronically signed by:  Romayne Matin, [address] [facility]

## 2021-06-24 ENCOUNTER — HOSPITAL ENCOUNTER (OUTPATIENT)
Dept: PHYSICAL THERAPY | Age: 51
Setting detail: THERAPIES SERIES
Discharge: HOME | End: 2021-06-24
Payer: COMMERCIAL

## 2021-06-24 PROCEDURE — 97110 THERAPEUTIC EXERCISES: CPT

## 2021-06-29 ENCOUNTER — OFFICE VISIT (OUTPATIENT)
Dept: PRIMARY CARE CLINIC | Age: 51
End: 2021-06-29
Payer: COMMERCIAL

## 2021-06-29 VITALS
OXYGEN SATURATION: 98 % | BODY MASS INDEX: 23.04 KG/M2 | HEIGHT: 63 IN | SYSTOLIC BLOOD PRESSURE: 148 MMHG | WEIGHT: 130 LBS | TEMPERATURE: 97.9 F | HEART RATE: 99 BPM | DIASTOLIC BLOOD PRESSURE: 94 MMHG

## 2021-06-29 DIAGNOSIS — E03.9 ACQUIRED HYPOTHYROIDISM: Primary | ICD-10-CM

## 2021-06-29 DIAGNOSIS — Z91.09 ENVIRONMENTAL ALLERGIES: ICD-10-CM

## 2021-06-29 DIAGNOSIS — M54.50 ACUTE BILATERAL LOW BACK PAIN WITHOUT SCIATICA: ICD-10-CM

## 2021-06-29 DIAGNOSIS — L20.84 INTRINSIC ECZEMA: ICD-10-CM

## 2021-06-29 DIAGNOSIS — F41.1 GAD (GENERALIZED ANXIETY DISORDER): ICD-10-CM

## 2021-06-29 PROCEDURE — 99214 OFFICE O/P EST MOD 30 MIN: CPT | Performed by: FAMILY MEDICINE

## 2021-06-29 PROCEDURE — G8420 CALC BMI NORM PARAMETERS: HCPCS | Performed by: FAMILY MEDICINE

## 2021-06-29 PROCEDURE — G8427 DOCREV CUR MEDS BY ELIG CLIN: HCPCS | Performed by: FAMILY MEDICINE

## 2021-06-29 PROCEDURE — 1036F TOBACCO NON-USER: CPT | Performed by: FAMILY MEDICINE

## 2021-06-29 RX ORDER — NORETHINDRONE ACETATE/ETHINYL ESTRADIOL 1MG-20MCG
KIT ORAL
Qty: 21 TABLET | Refills: 5 | Status: SHIPPED
Start: 2021-06-29 | End: 2021-10-12 | Stop reason: SDUPTHER

## 2021-06-29 RX ORDER — LIDOCAINE 50 MG/G
OINTMENT TOPICAL
Qty: 50 G | Refills: 5 | Status: SHIPPED
Start: 2021-06-29 | End: 2021-12-15 | Stop reason: SDUPTHER

## 2021-06-29 RX ORDER — LEVOTHYROXINE SODIUM 0.1 MG/1
TABLET ORAL
Qty: 90 TABLET | Refills: 1 | Status: SHIPPED
Start: 2021-06-29 | End: 2021-09-02 | Stop reason: SDUPTHER

## 2021-06-29 RX ORDER — FLUOXETINE HYDROCHLORIDE 20 MG/1
CAPSULE ORAL
Qty: 270 CAPSULE | Refills: 1 | Status: SHIPPED
Start: 2021-06-29 | End: 2021-08-16 | Stop reason: SDUPTHER

## 2021-06-29 RX ORDER — BACLOFEN 10 MG/1
10 TABLET ORAL EVERY EVENING
Qty: 30 TABLET | Refills: 5 | Status: SHIPPED | OUTPATIENT
Start: 2021-06-29 | End: 2021-07-29

## 2021-06-29 RX ORDER — CETIRIZINE HYDROCHLORIDE 10 MG/1
10 TABLET ORAL DAILY
Qty: 90 TABLET | Refills: 1 | Status: SHIPPED
Start: 2021-06-29 | End: 2021-12-15

## 2021-06-29 RX ORDER — TRIAMCINOLONE ACETONIDE 5 MG/G
CREAM TOPICAL
Qty: 15 G | Refills: 1 | Status: SHIPPED
Start: 2021-06-29 | End: 2022-02-03 | Stop reason: SDUPTHER

## 2021-06-29 ASSESSMENT — ENCOUNTER SYMPTOMS
WHEEZING: 0
BACK PAIN: 1
VOMITING: 0
COUGH: 0
SHORTNESS OF BREATH: 0
CONSTIPATION: 0
NAUSEA: 0
ABDOMINAL PAIN: 0
DIARRHEA: 0

## 2021-06-29 NOTE — PROGRESS NOTES
21  Eryn Fish : 1970 Sex: female  Age: 52 y.o. Chief Complaint   Patient presents with    Medication Refill    Lower Back Pain    Eczema     bilateral lower extremities     HPI:  52 y.o. female presents today for 4 month follow up of chronic medical conditions and medication refills. Patient's chart, medical, surgical and medication history all reviewed. Hypothyroidism  Patient presents for routine follow up of Hypothyroidism. Current symptoms: none. Patient denies change in energy level, diarrhea, heat / cold intolerance, nervousness, palpitations and weight changes. Symptoms have been well-controlled. No difficulty swallowing or masses felt. Last TSH was 1.540. Patient is currently taking Levothyroxine 100 mcg. She has history of CVID. Takes Biaxin daily. Follows with Dr. Adrienne De La Vega for management. BP elevated- patient states that she has white coat syndrome. BP always elevated in office. She also takes Sudafed every morning. Back Pain  Patient also notes continued intermittent back pains. The pain started 3 month(s) ago. The patient denies any trauma or injury. She was seen through [facility] and given NSAIDs and muscle relaxers. She had to stop taking because they upset her stomach. The patient has been using [employer], Lidocaine and heating pad at home with good relief of their symptoms. She notes that her pain is now \"85-90%\" better. She denies numbness, tingling or weakness to the extremities. She denies any saddle anesthesia. No bowel or bladder incontinence. No fever or chills. She has been going to PT- has one visit left. Thinks the pain is worsened by stress. ROS:  Review of Systems   Constitutional: Negative for chills, fatigue and fever. Respiratory: Negative for cough, shortness of breath and wheezing. Cardiovascular: Negative for chest pain and palpitations.    Gastrointestinal: Negative for abdominal pain, constipation, diarrhea, nausea and vomiting. Musculoskeletal: Positive for back pain. Negative for arthralgias. Skin: Positive for rash. Allergic/Immunologic: Positive for environmental allergies and immunocompromised state. Neurological: Negative for dizziness and headaches. Psychiatric/Behavioral: Negative for dysphoric mood. The patient is not nervous/anxious. All other systems reviewed and are negative. Current Outpatient Medications on File Prior to Visit   Medication Sig Dispense Refill    diclofenac sodium (VOLTAREN) 1 % GEL Apply topically 2 times daily 100 g 0    clarithromycin (BIAXIN) 250 MG tablet Take 250 mg by mouth daily      Cholecalciferol (VITAMIN D3) 25 MCG (1000 UT) CAPS Take by mouth      Turmeric Curcumin 500 MG CAPS Take by mouth      Multiple Vitamins-Minerals (ONE-A-DAY WOMENS PO) Take by mouth      Biotin 1000 MCG CHEW Take by mouth      SYMBICORT 160-4.5 MCG/ACT AERO inhale 1 to 2 puffs by mouth every 12 hours Rinse mouth after use      fluticasone (FLONASE) 50 MCG/ACT nasal spray instill 1 to 2 sprays into each nostril every morning      montelukast (SINGULAIR) 10 MG tablet take 1 tablet by mouth at bedtime      Probiotic Product (PROBIOTIC DAILY PO) Take by mouth LD 4/6/16      Omega-3 Fatty Acids (FISH OIL PO) Take by mouth LD 4/6/16      azelastine (ASTELIN) 137 MCG/SPRAY nasal spray 2 sprays by Nasal route nightly. Use in each nostril as directed        No current facility-administered medications on file prior to visit.        Allergies   Allergen Reactions    Prednisone        Past Medical History:   Diagnosis Date    Anxiety     Common variable immunodeficiency ([facility] [address]     FU with Dr. Loren Muller, no recent issues, stable     Environmental allergies     Kidney stone     Thyroid disease      Past Surgical History:   Procedure Laterality Date    COLONOSCOPY  04/11/2016    LITHOTRIPSY  2019    UPPER GASTROINTESTINAL ENDOSCOPY  04/11/2016    WISDOM TOOTH EXTRACTION       History reviewed. No pertinent family history. Social History     Socioeconomic History    Marital status: Single     Spouse name: Not on file    Number of children: Not on file    Years of education: Not on file    Highest education level: Not on file   Occupational History    Not on file   Tobacco Use    Smoking status: Never Smoker    Smokeless tobacco: Never Used   Substance and Sexual Activity    Alcohol use: No    Drug use: No    Sexual activity: Not on file   Other Topics Concern    Not on file   Social History Narrative    Not on file     Social Determinants of Health     Financial Resource Strain:     Difficulty of Paying Living Expenses:    Food Insecurity:     Worried About Running Out of Food in the Last Year:     [address] in the Last Year:    Transportation Needs:     Lack of Transportation (Medical):  Lack of Transportation (Non-Medical):    Physical Activity:     Days of Exercise per Week:     Minutes of Exercise per Session:    Stress:     Feeling of Stress :    Social Connections:     Frequency of Communication with Friends and Family:     Frequency of Social Gatherings with Friends and Family:     Attends Church Services:     Active Member of Clubs or Organizations:     Attends Club or Organization Meetings:     Marital Status:    Intimate Partner Violence:     Fear of Current or Ex-Partner:     Emotionally Abused:     Physically Abused:     Sexually Abused:        Vitals:    06/29/21 0904 06/29/21 0931   BP: (!) 148/94    Pulse: 126 99   Temp: 97.9 °F (36.6 °C)    SpO2: 98%    Weight: 130 lb (59 kg)    Height: 5' 3\" (1.6 m)        Physical Exam:  Physical Exam  Vitals and nursing note reviewed. Constitutional:       General: She is not in acute distress. Appearance: Normal appearance. She is well-developed and normal weight. She is not ill-appearing. HENT:      Head: Normocephalic and atraumatic.       Right Ear: Hearing and external ear normal.      Left CREATININE 0.9 04/09/2021    GFRAA >60 04/09/2021    LABGLOM >60 04/09/2021    GLUCOSE 90 04/09/2021    PROT 6.7 04/09/2021    LABALBU 4.0 04/09/2021    CALCIUM 9.4 04/09/2021    BILITOT 0.4 04/09/2021    ALKPHOS 46 04/09/2021    AST 21 04/09/2021    ALT 18 04/09/2021     U/A:    Lab Results   Component Value Date    COLORU Yellow 04/09/2021    PROTEINU Negative 04/09/2021    PHUR 5.5 04/09/2021    LABCAST MODERATE 02/22/2016    WBCUA 1-3 04/09/2021    RBCUA 0-1 04/09/2021    RBCUA 0-1 09/05/2012    BACTERIA MANY 04/09/2021    CLARITYU TURBID 04/09/2021    SPECGRAV >=1.030 04/09/2021    LEUKOCYTESUR Negative 04/09/2021    UROBILINOGEN 0.2 04/09/2021    BILIRUBINUR Negative 04/09/2021    BLOODU Negative 04/09/2021    GLUCOSEU Negative 04/09/2021    AMORPHOUS FEW 02/22/2016     HgBA1c:    Lab Results   Component Value Date    LABA1C 5.0 04/09/2021     FLP:    Lab Results   Component Value Date    TRIG 46 04/09/2021     04/09/2021    LDLCALC 64 04/09/2021    LABVLDL 9 04/09/2021     TSH:    Lab Results   Component Value Date    TSH 1.540 04/09/2021          Assessment and Plan:  Mahogany Allison was seen today for medication refill, lower back pain and eczema. Diagnoses and all orders for this visit:    Acquired hypothyroidism  -     levothyroxine (SYNTHROID) 100 MCG tablet; take 1 tablet by mouth once daily  Well controlled. Continue same dosing. Acute bilateral low back pain without sciatica  -     lidocaine (XYLOCAINE) 5 % ointment; Apply topically as needed. -     baclofen (LIORESAL) 10 MG tablet; Take 1 tablet by mouth every evening  Will try adding low dose muscle relaxer at bedtime. Discussed massage and continued PT/home exercises. Intrinsic eczema  -     triamcinolone (ARISTOCORT) 0.5 % cream; Apply topically 3 times daily. Patient only using Hydrocortisone, will try increasing potency of steroid    Environmental allergies  -     cetirizine (ZYRTEC) 10 MG tablet;  Take 1 tablet by mouth

## 2021-06-30 ENCOUNTER — TELEPHONE (OUTPATIENT)
Dept: ADMINISTRATIVE | Age: 51
End: 2021-06-30

## 2021-06-30 NOTE — TELEPHONE ENCOUNTER
She can take Baclofen 3 times/day or take 20 mg at bed time.   If this pain is that bad, we need to send her to pain management

## 2021-06-30 NOTE — TELEPHONE ENCOUNTER
Pt tried the Baclofen last night and it did not work. She wants to know if you can prescribe something stronger, more effective? Please contact pt with further instructions.

## 2021-07-01 ENCOUNTER — TELEPHONE (OUTPATIENT)
Dept: PRIMARY CARE CLINIC | Age: 51
End: 2021-07-01

## 2021-07-01 ENCOUNTER — OFFICE VISIT (OUTPATIENT)
Dept: FAMILY MEDICINE CLINIC | Age: 51
End: 2021-07-01
Payer: COMMERCIAL

## 2021-07-01 VITALS
TEMPERATURE: 97.9 F | WEIGHT: 132 LBS | HEIGHT: 63 IN | BODY MASS INDEX: 23.39 KG/M2 | RESPIRATION RATE: 18 BRPM | DIASTOLIC BLOOD PRESSURE: 88 MMHG | SYSTOLIC BLOOD PRESSURE: 156 MMHG | OXYGEN SATURATION: 97 % | HEART RATE: 97 BPM

## 2021-07-01 DIAGNOSIS — M54.50 ACUTE BILATERAL LOW BACK PAIN WITHOUT SCIATICA: Primary | ICD-10-CM

## 2021-07-01 PROCEDURE — G8420 CALC BMI NORM PARAMETERS: HCPCS | Performed by: PHYSICIAN ASSISTANT

## 2021-07-01 PROCEDURE — G8427 DOCREV CUR MEDS BY ELIG CLIN: HCPCS | Performed by: PHYSICIAN ASSISTANT

## 2021-07-01 PROCEDURE — 96372 THER/PROPH/DIAG INJ SC/IM: CPT | Performed by: PHYSICIAN ASSISTANT

## 2021-07-01 PROCEDURE — 1036F TOBACCO NON-USER: CPT | Performed by: PHYSICIAN ASSISTANT

## 2021-07-01 PROCEDURE — 99214 OFFICE O/P EST MOD 30 MIN: CPT | Performed by: PHYSICIAN ASSISTANT

## 2021-07-01 RX ORDER — ORPHENADRINE CITRATE 100 MG/1
100 TABLET, EXTENDED RELEASE ORAL 2 TIMES DAILY
Qty: 20 TABLET | Refills: 0 | Status: SHIPPED | OUTPATIENT
Start: 2021-07-01 | End: 2021-07-11

## 2021-07-01 RX ORDER — KETOROLAC TROMETHAMINE 30 MG/ML
30 INJECTION, SOLUTION INTRAMUSCULAR; INTRAVENOUS ONCE
Status: COMPLETED | OUTPATIENT
Start: 2021-07-01 | End: 2021-07-01

## 2021-07-01 RX ORDER — KETOROLAC TROMETHAMINE 10 MG/1
10 TABLET, FILM COATED ORAL EVERY 6 HOURS PRN
Qty: 20 TABLET | Refills: 0 | Status: SHIPPED
Start: 2021-07-01 | End: 2021-10-12

## 2021-07-01 RX ADMIN — KETOROLAC TROMETHAMINE 30 MG: 30 INJECTION, SOLUTION INTRAMUSCULAR; INTRAVENOUS at 17:31

## 2021-07-01 NOTE — TELEPHONE ENCOUNTER
The pt was in to see you 6/29 and you gave her a muscle relaxer for some lower back pain (baclofen 10 mg).  She is calling to let you know that it is not helping at all

## 2021-07-01 NOTE — PROGRESS NOTES
21  Alan Hardin : 1970 Sex: female  Age 52 y.o. Subjective:  Chief Complaint   Patient presents with    Back Pain     on going issue. currently doing PT.          HPI:   Alan Hardin , 52 y.o. female presents to [facility] care for evaluation of chronic low back pain. The patient has had the symptoms that have been ongoing for the last 3 months or so. The patient was seen back in March for something similar. The patient has had this bilateral low back pain. The patient has been going to physical therapy. Saw PCP. The patient was placed on a muscle relaxant and given Lidoderm cream.  This does not seem to be helping her. The patient asked for something stronger. They want to give it a little bit more time. The patient states that she has not noted any significant improvement here in the last couple of days. She is not having any bladder or bowel incontinence or urinary retention. No saddle anesthesia. The patient is not having any fevers. No urinary symptoms. The patient believes that some of the symptoms may have been worsened by stress recently. ROS:   Unless otherwise stated in this report the patient's positive and negative responses for review of systems for constitutional, eyes, ENT, cardiovascular, respiratory, gastrointestinal, neurological, , musculoskeletal, and integument systems and related systems to the presenting problem are either stated in the history of present illness or were not pertinent or were negative for the symptoms and/or complaints related to the presenting medical problem. Positives and pertinent negatives as per HPI. All others reviewed and are negative.       PMH:     Past Medical History:   Diagnosis Date    Anxiety     [facility]     FU with Dr. Марина Davison, no recent issues, stable     Environmental allergies     Kidney stone     Thyroid disease        Past Surgical History:   Procedure Laterality Date at bedtime, Disp: , Rfl:     Probiotic Product (PROBIOTIC DAILY PO), Take by mouth LD 4/6/16, Disp: , Rfl:     Omega-3 Fatty Acids (FISH OIL PO), Take by mouth LD 4/6/16, Disp: , Rfl:     azelastine (ASTELIN) 137 MCG/SPRAY nasal spray, 2 sprays by Nasal route nightly. Use in each nostril as directed , Disp: , Rfl:     Allergies: Allergies   Allergen Reactions    Prednisone        Social History:     Social History     Tobacco Use    Smoking status: Never Smoker    Smokeless tobacco: Never Used   Substance Use Topics    Alcohol use: No    Drug use: No       Patient lives at home. Physical Exam:     Vitals:    07/01/21 1641   BP: (!) 156/88   Pulse: 97   Resp: 18   Temp: 97.9 °F (36.6 °C)   SpO2: 97%   Weight: 132 lb (59.9 kg)   Height: 5' 3\" (1.6 m)       Exam:  Physical Exam  Vital Signs reviewed and nurse's notes reviewed. The patient is not hypoxic. General: Alert, no acute distress, patient resting comfortably  Skin: warm, intact, no pallor noted  Head: Normocephalic, atraumatic  Eye: Normal conjunctiva  Respiratory: No acute distress  Abdomen: Normal bowel sounds, soft, nontender, no masses detected. No rebound, guarding, or rigidity noted. Back: inspection of the back shows no obvious deformity, no swelling, no ecchymosis, contusion, abrasion, swelling, erythema, fluctuance or induration. Tenderness noted to diffuse lower lumbar area without specific midline tenderness. No step offs or crepitus noted. Straight leg raise on left is negative . Straight leg raise on right is negative. DTR 2+ at patella bilaterally and symmetrically. No CVA tenderness noted bilaterally. .  Musculoskeletal: No deformity noted to bilateral lower extremities. no cyanosis or mottling noted. normal pulses at DP and PT 2+ bilaterally and symmetrically. Patient is able to ambulate. Normal sensation noted to the bilateral lower extremities. Neurological: alert and oriented x4, normal sensory and motor observed. DTR 2+ at patellar bilaterally. Psychiatric: Cooperative        Testing:     EXAMINATION:   4 XRAY VIEWS OF THE LUMBAR SPINE       3/23/2021 1:22 pm       COMPARISON:   None.       HISTORY:   ORDERING SYSTEM PROVIDED HISTORY: Acute bilateral low back pain without   sciatica   TECHNOLOGIST PROVIDED HISTORY:   Reason for exam:->Acute back pain without radiation       FINDINGS:   No fracture or dislocation.  Degenerative disc disease is most pronounced at   L2-3.  Degenerative facet arthropathy is most notable from L4-S1.  Normal   soft tissues.           Impression   Degenerative spondylotic changes. Medical Decision Making:     Vital signs reviewed    Past medical history reviewed. Allergies reviewed. Medications reviewed. Patient on arrival does not appear to be in any apparent distress or discomfort. The patient has been seen and evaluated. The patient does not appear to be toxic or lethargic. The patient had x-rays reviewed and it showed degenerative spondylitic changes. The patient will be treated with intramuscular injection of ketorolac here. The patient will be started on ketorolac. The patient will give the Norflex. She will discontinue the other muscle relaxant. Patient will continue with physical therapy. Follow-up with PCP for further pain management. The patient is to return to express care or go directly to the emergency department should any of the signs or symptoms worsen. The patient is to followup with primary care physician in 2-3 days for repeat evaluation. The patient has no other questions or concerns at this time the patient will be discharged home. Clinical Impression:   Teto Perez was seen today for back pain. Diagnoses and all orders for this visit:    Acute bilateral low back pain without sciatica    Other orders  -     ketorolac (TORADOL) injection 30 mg  -     ketorolac (TORADOL) 10 MG tablet;  Take 1 tablet by mouth every 6 hours as needed for Pain  -     orphenadrine (NORFLEX) 100 MG extended release tablet; Take 1 tablet by mouth 2 times daily for 10 days        The patient is to call for any concerns or return if any of the signs or symptoms worsen. The patient is to follow-up with PCP in the next 2-3 days for repeat evaluation repeat assessment or go directly to the emergency department.      SIGNATURE: Keren Coleman III, PA-C

## 2021-07-01 NOTE — TELEPHONE ENCOUNTER
It has only been 2 days. It's not going to be instant.   She needs to see how her back does with the muscle relaxer and PT in combination

## 2021-07-09 ENCOUNTER — HOSPITAL ENCOUNTER (OUTPATIENT)
Dept: PHYSICAL THERAPY | Age: 51
Setting detail: THERAPIES SERIES
Discharge: HOME | End: 2021-07-09
Payer: COMMERCIAL

## 2021-07-09 PROCEDURE — 97110 THERAPEUTIC EXERCISES: CPT

## 2021-07-09 NOTE — PROGRESS NOTES
[facility]  Phone: [phone] Fax: [phone]       Physical Therapy Daily Treatment Note  Date:  2021    Patient Name:  Tracy Narayan    :  1970  MRN: [id number]    Restrictions/Precautions:    Diagnosis:  Back Pain   Treatment Diagnosis:    Insurance[facility]Certification information: [facility]    Referring Physician: Dr Briseida Forrest of care signed (Y/N):    Visit# / total visits:    Pain level: /10  Time In:    1000   Time Out:   1050     Subjective:      Exercises:  Exercise/Equipment Resistance/Repetitions Other comments     Bike 10 min              Prayer str 10 reps       Cat Str 15 reps       Supine IT IT Band str  10 reps bilateral      Piriformis str   10 reps bilateral      Bridge  10 reps      Isometric Hip Flex  10 reps                Squat 10 reps        Quadruped Hip/Knee ext  5 reps bilateral       Resisted lateral walk  2 laps  nt                                                      Other Therapeutic Activities:      Home Exercise Program: Access Code: [email or web address]  URL: [email or web address]  Date: 2021  Prepared by: Lauren Haynes    Exercises  Child's Pose Stretch - 1 x daily - 7 x weekly - 1 sets - 10 reps - 5 hold  Cat-Camel - 1 x daily - 7 x weekly - 1 sets - 10 reps - 2 hold  Supine Bridge - 1 x daily - 7 x weekly - 1 sets - 10 reps - 3 hold  Quadruped Alternating Leg Extensions - 1 x daily - 7 x weekly - 1 sets - 10 reps - 2 hold  Supine Double Bent Leg Lift - 1 x daily - 7 x weekly - 1 sets - 10 reps - 3 hold  Supine ITB Stretch with Strap - 1 x daily - 7 x weekly - 1 sets - 10 reps - 5 hold  Squat - 1 x daily - 7 x weekly - 1 sets - 10 reps - 3 hold  Supine Piriformis Stretch - 1 x daily - 7 x weekly - 1 sets - 10 reps - 5 hold       Manual Treatments:      Modalities:      Timed Code Treatment Minutes:  30    Total Treatment Minutes:  50    Treatment/Activity Tolerance:  [x] Patient tolerated treatment well [] Patient limited by

## 2021-07-26 DIAGNOSIS — E03.9 ACQUIRED HYPOTHYROIDISM: ICD-10-CM

## 2021-07-26 RX ORDER — LEVOTHYROXINE SODIUM 0.1 MG/1
TABLET ORAL
Qty: 90 TABLET | Refills: 1 | OUTPATIENT
Start: 2021-07-26

## 2021-07-26 NOTE — TELEPHONE ENCOUNTER
----- Message from Pradip Abrams sent at 7/26/2021  2:51 PM EDT -----  Subject: Refill Request    QUESTIONS  Name of Medication? levothyroxine (SYNTHROID) 100 MCG tablet  Patient-reported dosage and instructions? 100 MCG tablet   How many days do you have left? 25  Preferred Pharmacy? [address] phone number (if available)? [phone]  Additional Information for Provider? Patient will need a refill before her   next appointment and wanted to go ahead and put in the request before her   PCP went on leave. Patient is scheduled for 9/7.  ---------------------------------------------------------------------------  --------------,  Name of Medication? FLUoxetine (PROZAC) 20 MG capsule  Patient-reported dosage and instructions? 20 MG  How many days do you have left? 25  Preferred Pharmacy? [address] phone number (if available)? [phone]  Additional Information for Provider? Patient will need a refill before her   next appointment and wanted to go ahead and put in the request before her   PCP went on leave. Patient is scheduled for 9/7.  ---------------------------------------------------------------------------  --------------,  Name of Medication? RAMILA 1/20 1-20 MG-MCG per tablet  Patient-reported dosage and instructions? 1/20 MG   How many days do you have left? 25  Preferred Pharmacy? [address] phone number (if available)? [phone]  Additional Information for Provider? Patient will need a refill before her   next appointment and wanted to go ahead and put in the request before her   PCP went on leave. Patient is scheduled for 9/7.  ---------------------------------------------------------------------------  --------------  CALL BACK INFO  What is the best way for the office to contact you? OK to leave message on   voicemail  Preferred Call Back Phone Number?  [phone]

## 2021-08-16 ENCOUNTER — TELEPHONE (OUTPATIENT)
Dept: PRIMARY CARE CLINIC | Age: 51
End: 2021-08-16

## 2021-08-16 DIAGNOSIS — F41.1 GAD (GENERALIZED ANXIETY DISORDER): ICD-10-CM

## 2021-08-16 RX ORDER — FLUOXETINE HYDROCHLORIDE 20 MG/1
CAPSULE ORAL
Qty: 270 CAPSULE | Refills: 1 | Status: SHIPPED
Start: 2021-08-16 | End: 2021-10-06 | Stop reason: SDUPTHER

## 2021-08-16 NOTE — TELEPHONE ENCOUNTER
----- Message from Carla Cantrell sent at 8/16/2021 10:21 AM EDT -----  Subject: Refill Request    QUESTIONS  Name of Medication? FLUoxetine (PROZAC) 20 MG capsule  Patient-reported dosage and instructions? 20 mg 3x daily   How many days do you have left? 3  Preferred Pharmacy? [address] phone number (if available)? [phone]  Additional Information for Provider? usually gets 3 month supply   ---------------------------------------------------------------------------  --------------  CALL BACK INFO  What is the best way for the office to contact you? OK to leave message on   voicemail, OK to respond with electronic message via [employer] portal (only   for patients who have registered [employer] account)  Preferred Call Back Phone Number?  [phone]

## 2021-08-16 NOTE — TELEPHONE ENCOUNTER
Left msg to return call to advise patient at this time [facility] is awaiting further clinical guidance from the [facility] and [employer].

## 2021-08-16 NOTE — TELEPHONE ENCOUNTER
----- Message from Cora Mak sent at 8/16/2021 10:24 AM EDT -----  Subject: Message to Provider    QUESTIONS  Information for Provider? Patient called to inquire about the [facility]   recommended Covid Booster. Patient got 2nd Moderna Vaccine on 03/04/21   Patient does have a compromised immune system and is getting her allergy   shot tomorrow and flu shot there after. How long and when should the get   this recommended booster   ---------------------------------------------------------------------------  --------------  CALL BACK INFO  What is the best way for the office to contact you? OK to leave message on   voicemail  Preferred Call Back Phone Number? [phone]  ---------------------------------------------------------------------------  --------------  SCRIPT ANSWERS  Relationship to Patient?  Self

## 2021-09-02 DIAGNOSIS — E03.9 ACQUIRED HYPOTHYROIDISM: ICD-10-CM

## 2021-09-02 RX ORDER — LEVOTHYROXINE SODIUM 0.1 MG/1
TABLET ORAL
Qty: 90 TABLET | Refills: 1 | Status: SHIPPED
Start: 2021-09-02 | End: 2021-10-12 | Stop reason: SDUPTHER

## 2021-10-06 DIAGNOSIS — F41.1 GAD (GENERALIZED ANXIETY DISORDER): ICD-10-CM

## 2021-10-06 RX ORDER — FLUOXETINE HYDROCHLORIDE 20 MG/1
CAPSULE ORAL
Qty: 270 CAPSULE | Refills: 1 | Status: SHIPPED
Start: 2021-10-06 | End: 2022-04-14

## 2021-10-12 ENCOUNTER — OFFICE VISIT (OUTPATIENT)
Dept: PRIMARY CARE CLINIC | Age: 51
End: 2021-10-12
Payer: COMMERCIAL

## 2021-10-12 VITALS
TEMPERATURE: 97.6 F | DIASTOLIC BLOOD PRESSURE: 102 MMHG | HEART RATE: 100 BPM | SYSTOLIC BLOOD PRESSURE: 170 MMHG | BODY MASS INDEX: 24.27 KG/M2 | WEIGHT: 137 LBS | HEIGHT: 63 IN | OXYGEN SATURATION: 98 %

## 2021-10-12 DIAGNOSIS — E03.9 ACQUIRED HYPOTHYROIDISM: ICD-10-CM

## 2021-10-12 DIAGNOSIS — E55.9 VITAMIN D INSUFFICIENCY: ICD-10-CM

## 2021-10-12 DIAGNOSIS — R73.01 IMPAIRED FASTING GLUCOSE: ICD-10-CM

## 2021-10-12 DIAGNOSIS — F41.1 GAD (GENERALIZED ANXIETY DISORDER): ICD-10-CM

## 2021-10-12 DIAGNOSIS — I10 PRIMARY HYPERTENSION: Primary | ICD-10-CM

## 2021-10-12 DIAGNOSIS — D83.9 CVID (COMMON VARIABLE IMMUNODEFICIENCY) (HCC): ICD-10-CM

## 2021-10-12 DIAGNOSIS — Z30.41 ENCOUNTER FOR SURVEILLANCE OF CONTRACEPTIVE PILLS: ICD-10-CM

## 2021-10-12 DIAGNOSIS — Z13.6 SCREENING FOR CARDIOVASCULAR CONDITION: ICD-10-CM

## 2021-10-12 PROCEDURE — G8427 DOCREV CUR MEDS BY ELIG CLIN: HCPCS | Performed by: FAMILY MEDICINE

## 2021-10-12 PROCEDURE — G8420 CALC BMI NORM PARAMETERS: HCPCS | Performed by: FAMILY MEDICINE

## 2021-10-12 PROCEDURE — G8482 FLU IMMUNIZE ORDER/ADMIN: HCPCS | Performed by: FAMILY MEDICINE

## 2021-10-12 PROCEDURE — 99214 OFFICE O/P EST MOD 30 MIN: CPT | Performed by: FAMILY MEDICINE

## 2021-10-12 PROCEDURE — 1036F TOBACCO NON-USER: CPT | Performed by: FAMILY MEDICINE

## 2021-10-12 RX ORDER — METOPROLOL SUCCINATE 25 MG/1
25 TABLET, EXTENDED RELEASE ORAL DAILY
Qty: 90 TABLET | Refills: 1 | Status: SHIPPED
Start: 2021-10-12 | End: 2021-12-22 | Stop reason: DRUGHIGH

## 2021-10-12 RX ORDER — NORETHINDRONE ACETATE/ETHINYL ESTRADIOL 1MG-20MCG
KIT ORAL
Qty: 21 TABLET | Refills: 11 | Status: SHIPPED
Start: 2021-10-12 | End: 2022-07-22

## 2021-10-12 RX ORDER — LEVOTHYROXINE SODIUM 0.1 MG/1
TABLET ORAL
Qty: 90 TABLET | Refills: 1 | Status: SHIPPED
Start: 2021-10-12 | End: 2022-05-12

## 2021-10-12 ASSESSMENT — ENCOUNTER SYMPTOMS
DIARRHEA: 0
VOMITING: 0
SHORTNESS OF BREATH: 0
NAUSEA: 0
CONSTIPATION: 0
BACK PAIN: 0
ABDOMINAL PAIN: 0
COUGH: 0
WHEEZING: 0

## 2021-10-12 NOTE — PROGRESS NOTES
10/12/21  Alea Drake : 1970 Sex: female  Age: 54 y.o. Chief Complaint   Patient presents with    Medication Refill    Hypothyroidism     HPI:  54 y.o. female presents today for 4 month follow up of chronic medical conditions, medication refills and FBW. Patient's chart, medical, surgical and medication history all reviewed. Hypothyroidism  Patient presents for routine follow up of Hypothyroidism. Current symptoms: none. Patient denies change in energy level, diarrhea, heat / cold intolerance, nervousness, palpitations and weight changes. Symptoms have been well-controlled. No difficulty swallowing or masses felt. Last TSH was 1.540. Patient is currently taking Levothyroxine 100 mcg. She has history of CVID. Takes Biaxin daily. Follows with Dr. Tigist Cervantes for management. Hypertension   The patient presents today for follow up of HTN. The problem is poorly controlled and worsening. Risk factors for coronary artery disease include Age > 30 and HTN. Current treatments include none. Lifestyle changes the patient has made include none. Today the patient is complaining of none. ROS:  Review of Systems   Constitutional: Negative for chills, fatigue and fever. Respiratory: Negative for cough, shortness of breath and wheezing. Cardiovascular: Negative for chest pain and palpitations. Gastrointestinal: Negative for abdominal pain, constipation, diarrhea, nausea and vomiting. Musculoskeletal: Negative for arthralgias and back pain. Skin: Negative for rash. Allergic/Immunologic: Positive for environmental allergies and immunocompromised state. Neurological: Negative for dizziness and headaches. Psychiatric/Behavioral: Negative for dysphoric mood. The patient is not nervous/anxious. All other systems reviewed and are negative.        Current Outpatient Medications on File Prior to Visit   Medication Sig Dispense Refill    FLUoxetine (PROZAC) 20 MG capsule take 1 file   Tobacco Use    Smoking status: Never Smoker    Smokeless tobacco: Never Used   Substance and Sexual Activity    Alcohol use: No    Drug use: No    Sexual activity: Not on file   Other Topics Concern    Not on file   Social History Narrative    Not on file     Social Determinants of Health     Financial Resource Strain:     Difficulty of Paying Living Expenses:    Food Insecurity:     Worried About Running Out of Food in the Last Year:     [address] in the Last Year:    Transportation Needs:     Lack of Transportation (Medical):  Lack of Transportation (Non-Medical):    Physical Activity:     Days of Exercise per Week:     Minutes of Exercise per Session:    Stress:     Feeling of Stress :    Social Connections:     Frequency of Communication with Friends and Family:     Frequency of Social Gatherings with Friends and Family:     Attends Jainism Services:     Active Member of Clubs or Organizations:     Attends Club or Organization Meetings:     Marital Status:    Intimate Partner Violence:     Fear of Current or Ex-Partner:     Emotionally Abused:     Physically Abused:     Sexually Abused:        Vitals:    10/12/21 0845   BP: (!) 170/102   Pulse: 100   Temp: 97.6 °F (36.4 °C)   SpO2: 98%   Weight: 137 lb (62.1 kg)   Height: 5' 3\" (1.6 m)       Physical Exam:  Physical Exam  Vitals and nursing note reviewed. Constitutional:       General: She is not in acute distress. Appearance: Normal appearance. She is well-developed and normal weight. She is not ill-appearing. HENT:      Head: Normocephalic and atraumatic. Right Ear: Hearing and external ear normal.      Left Ear: Hearing and external ear normal.      Nose:      Comments: Wearing mask  Eyes:      General: Lids are normal. No scleral icterus. Extraocular Movements: Extraocular movements intact. Conjunctiva/sclera: Conjunctivae normal.   Neck:      Thyroid: No thyromegaly.    Cardiovascular: Rate and Rhythm: Normal rate and regular rhythm. Heart sounds: Normal heart sounds. No murmur heard. Pulmonary:      Effort: Pulmonary effort is normal. No respiratory distress. Breath sounds: Normal breath sounds. No wheezing. Musculoskeletal:         General: No tenderness or deformity. Normal range of motion. Cervical back: Normal range of motion and neck supple. Right lower leg: No edema. Left lower leg: No edema. Lymphadenopathy:      Cervical: No cervical adenopathy. Skin:     General: Skin is warm and dry. Findings: No rash. Neurological:      General: No focal deficit present. Mental Status: She is alert and oriented to person, place, and time. Gait: Gait normal.   Psychiatric:         Mood and Affect: Affect normal. Mood is anxious. Speech: Speech normal.         Behavior: Behavior normal.         Thought Content:  Thought content normal.         Labs:  CBC with Differential:    Lab Results   Component Value Date    WBC 7.2 04/09/2021    RBC 5.10 04/09/2021    HGB 15.1 04/09/2021    HCT 47.1 04/09/2021     04/09/2021    MCV 92.4 04/09/2021    MCH 29.6 04/09/2021    MCHC 32.1 04/09/2021    RDW 11.9 04/09/2021    SEGSPCT 63 09/05/2012    LYMPHOPCT 27.7 04/09/2021    MONOPCT 4.5 04/09/2021    BASOPCT 0.6 04/09/2021    MONOSABS 0.32 04/09/2021    LYMPHSABS 1.98 04/09/2021    EOSABS 0.07 04/09/2021    BASOSABS 0.04 04/09/2021     CMP:    Lab Results   Component Value Date     04/09/2021    K 3.8 04/09/2021    K 3.6 02/17/2020     04/09/2021    CO2 27 04/09/2021    BUN 16 04/09/2021    CREATININE 0.9 04/09/2021    GFRAA >60 04/09/2021    LABGLOM >60 04/09/2021    GLUCOSE 90 04/09/2021    PROT 6.7 04/09/2021    LABALBU 4.0 04/09/2021    CALCIUM 9.4 04/09/2021    BILITOT 0.4 04/09/2021    ALKPHOS 46 04/09/2021    AST 21 04/09/2021    ALT 18 04/09/2021     U/A:    Lab Results   Component Value Date    COLORU Yellow 04/09/2021    PROTEINU Negative 04/09/2021    PHUR 5.5 04/09/2021    LABCAST MODERATE 02/22/2016    WBCUA 1-3 04/09/2021    RBCUA 0-1 04/09/2021    RBCUA 0-1 09/05/2012    BACTERIA MANY 04/09/2021    CLARITYU TURBID 04/09/2021    SPECGRAV >=1.030 04/09/2021    LEUKOCYTESUR Negative 04/09/2021    UROBILINOGEN 0.2 04/09/2021    BILIRUBINUR Negative 04/09/2021    BLOODU Negative 04/09/2021    GLUCOSEU Negative 04/09/2021    AMORPHOUS FEW 02/22/2016     HgBA1c:    Lab Results   Component Value Date    LABA1C 5.0 04/09/2021     FLP:    Lab Results   Component Value Date    TRIG 46 04/09/2021     04/09/2021    LDLCALC 64 04/09/2021    LABVLDL 9 04/09/2021     TSH:    Lab Results   Component Value Date    TSH 1.540 04/09/2021          Assessment and Plan:  Michael Marquez was seen today for medication refill and hypothyroidism. Diagnoses and all orders for this visit:    Primary hypertension  -     metoprolol succinate (TOPROL XL) 25 MG extended release tablet; Take 1 tablet by mouth daily  BP remains poorly controlled. Continues to worsen at each visit. Explained that even if it is \"white coat syndrome\", we cannot allow it to spike that high. Will start with Metoprolol due to elevated HR as well. Due for fasting labs. DESIRAE (generalized anxiety disorder)  -     CBC Auto Differential; Future  -     Comprehensive Metabolic Panel; Future  -     TSH without Reflex; Future  -     Urinalysis; Future  -     T4, Free; Future    Acquired hypothyroidism  -     levothyroxine (SYNTHROID) 100 MCG tablet; take 1 tablet by mouth once daily  -     CBC Auto Differential; Future  -     Comprehensive Metabolic Panel; Future  -     TSH without Reflex; Future  -     Urinalysis; Future  -     T4, Free; Future  Due for recheck    CVID (common variable immunodeficiency) ([facility]  Follows with ID    Impaired fasting glucose  -     Hemoglobin A1C; Future    Vitamin D insufficiency  -     Vitamin D 25 Hydroxy;  Future    Screening for cardiovascular condition  - Lipid Panel; Future    Encounter for surveillance of contraceptive pills  -     RAMILA 1/20 1-20 MG-MCG per tablet; take 1 tablet by mouth once daily as directed          Return in about 4 months (around 2/12/2022), or if symptoms worsen or fail to improve, for Chronic medical conditions.       Seen By:  Mingo Wong, DO

## 2021-10-25 ENCOUNTER — TELEPHONE (OUTPATIENT)
Dept: PRIMARY CARE CLINIC | Age: 51
End: 2021-10-25

## 2021-10-25 ENCOUNTER — TELEPHONE (OUTPATIENT)
Dept: FAMILY MEDICINE CLINIC | Age: 51
End: 2021-10-25

## 2021-10-25 NOTE — TELEPHONE ENCOUNTER
----- Message from Odell Moise sent at 10/25/2021 12:47 PM EDT -----  Subject: Message to Provider    QUESTIONS  Information for Provider? Patient wants to know when she should receive   her covid booster vaccination. Please call patient and advise.  ---------------------------------------------------------------------------  --------------  CALL BACK INFO  What is the best way for the office to contact you? OK to leave message on   voicemail  Preferred Call Back Phone Number? [phone]  ---------------------------------------------------------------------------  --------------  SCRIPT ANSWERS  Relationship to Patient?  Self

## 2021-10-25 NOTE — TELEPHONE ENCOUNTER
----- Message from Prudence Proxama sent at 10/25/2021  1:09 PM EDT -----  Subject: Message to Provider    QUESTIONS  Information for Provider? pt called in regards to a booster shot pt can be   reached at [phone]  ---------------------------------------------------------------------------  --------------  [address]  What is the best way for the office to contact you? OK to leave message on   voicemail  Preferred Call Back Phone Number? [phone]  ---------------------------------------------------------------------------  --------------  SCRIPT ANSWERS  Relationship to Patient?  Self

## 2021-11-18 DIAGNOSIS — Z30.41 ENCOUNTER FOR SURVEILLANCE OF CONTRACEPTIVE PILLS: ICD-10-CM

## 2021-11-18 RX ORDER — NORETHINDRONE ACETATE/ETHINYL ESTRADIOL 1MG-20MCG
KIT ORAL
Qty: 21 TABLET | Refills: 11 | OUTPATIENT
Start: 2021-11-18

## 2021-11-18 NOTE — TELEPHONE ENCOUNTER
Left detailed msg on pt vm to advise birth control was refilled at her 10/12 visit. She just needs to call the pharmacy for a refill.

## 2021-11-18 NOTE — TELEPHONE ENCOUNTER
----- Message from Valdemar Alonso sent at 11/18/2021  8:14 AM EST -----  Subject: Refill Request    QUESTIONS  Name of Medication? RAMILA 1/20 1-20 MG-MCG per tablet  Patient-reported dosage and instructions? 1/20 1-20 mg tablet  How many days do you have left? 0  Preferred Pharmacy? [address] phone number (if available)? [phone]  ---------------------------------------------------------------------------  --------------  CALL BACK INFO  What is the best way for the office to contact you? OK to leave message on   voicemail  Preferred Call Back Phone Number?  [phone]

## 2021-12-10 ENCOUNTER — TELEPHONE (OUTPATIENT)
Dept: PRIMARY CARE CLINIC | Age: 51
End: 2021-12-10

## 2021-12-10 ENCOUNTER — NURSE TRIAGE (OUTPATIENT)
Dept: OTHER | Facility: CLINIC | Age: 51
End: 2021-12-10

## 2021-12-10 NOTE — TELEPHONE ENCOUNTER
Reason for Disposition   Systolic BP >= 964 OR Diastolic >= 80, and is not taking BP medications    Answer Assessment - Initial Assessment Questions  1. BLOOD PRESSURE: \"What is the blood pressure? \" \"Did you take at least two measurements 5 minutes apart? \"      Automatic cuff- took BP twice- patient reports dentist said it was \"too high to do the procedure\"- patient reports BP was 180/\"unknown number\"    2. ONSET: \"When did you take your blood pressure? \"     Patient was very nervous when at the Dentist.  Raman [place] sudafed and nose spray for allergies- happened this morning    3. HOW: \"How did you obtain the blood pressure? \" (e.g., visiting nurse, automatic home BP monitor)      Taken at the Dentist office    4. HISTORY: \"Do you have a history of high blood pressure? \"      Fluctuates per patient    5. MEDICATIONS: Edvinkenneth Taylor you taking any medications for blood pressure? \" \"Have you missed any doses recently? \"      N/A    6. OTHER SYMPTOMS: \"Do you have any symptoms? \" (e.g., headache, chest pain, blurred vision, difficulty breathing, weakness)     Denies    7. PREGNANCY: \"Is there any chance you are pregnant? \" \"When was your last menstrual period? \"     N/A    Protocols used: HIGH BLOOD PRESSURE-ADULT-OH    Reports Dr. Ana Laura Song prescribed BP medication in October 2021 but patient has put off taking it. Was told to take medication at night. Patient will begin to take Metoprolol 25 mg as prescribed and agreed to make f/u in next two weeks. Needs paperwork signed by Provider for Dental procedure due to elevated BP. Received call from [address] [place] [address] at [facility] with Red Flag Complaint. Brief description of triage: See above. Triage indicates for patient to be seen in the next two weeks. Care advice provided, patient verbalizes understanding; denies any other questions or concerns; instructed to call back for any new or worsening symptoms.     Writer provided warm transfer to [facility] at [facility] for appointment scheduling. Attention Provider: Thank you for allowing me to participate in the care of your patient. The patient was connected to triage in response to information provided to the ECC/PSC. Please do not respond through this encounter as the response is not directed to a shared pool.

## 2021-12-10 NOTE — TELEPHONE ENCOUNTER
----- Message from Michelle Newberry sent at 12/10/2021 11:16 AM EST -----  Subject: Appointment Request    Reason for Call: Routine Return from RN Triage    QUESTIONS  Type of Appointment? Established Patient  Reason for appointment request? Available appointments did not meet   patient need  Additional Information for Provider? Patient is a return from [facility] with   elevated blood pressure. Was advised to be seen with in the next 2 weeks. Available appointments did not meet patient need. Please call to schedule.  ---------------------------------------------------------------------------  --------------  CALL BACK INFO  What is the best way for the office to contact you? OK to leave message on   voicemail  Preferred Call Back Phone Number? [phone]  ---------------------------------------------------------------------------  --------------  SCRIPT ANSWERS  Patient needs to be seen within 5 days? No  Patient can be seen for a routine visit? Yes   Nurse Name? Jacobo Castillo  Have you been diagnosed with, awaiting test results for, or told that you   are suspected of having COVID-19 (Coronavirus)? (If patient has tested   negative or was tested as a requirement for work, school, or travel and   not based on symptoms, answer no)? No  Within the past two weeks have you developed any of the following symptoms   (answer no if symptoms have been present longer than 2 weeks or began   more than 2 weeks ago)? Fever or Chills, Cough, Shortness of breath or   difficulty breathing, Loss of taste or smell, Sore throat, Nasal   congestion, Sneezing or runny nose, Fatigue or generalized body aches   (answer no if pain is specific to a body part e.g. back pain), Diarrhea,   Headache? No  Have you had close contact with someone with COVID-19 in the last 14 days? No  (Service Expert  click yes below to proceed with [employer] As Usual   Scheduling)?  Yes

## 2021-12-15 ENCOUNTER — OFFICE VISIT (OUTPATIENT)
Dept: PRIMARY CARE CLINIC | Age: 51
End: 2021-12-15
Payer: COMMERCIAL

## 2021-12-15 VITALS
DIASTOLIC BLOOD PRESSURE: 102 MMHG | BODY MASS INDEX: 24.77 KG/M2 | TEMPERATURE: 97.7 F | OXYGEN SATURATION: 99 % | HEART RATE: 88 BPM | WEIGHT: 139.8 LBS | SYSTOLIC BLOOD PRESSURE: 154 MMHG | HEIGHT: 63 IN

## 2021-12-15 DIAGNOSIS — E03.9 ACQUIRED HYPOTHYROIDISM: ICD-10-CM

## 2021-12-15 DIAGNOSIS — I10 PRIMARY HYPERTENSION: Primary | ICD-10-CM

## 2021-12-15 DIAGNOSIS — R21 RASH AND NONSPECIFIC SKIN ERUPTION: ICD-10-CM

## 2021-12-15 DIAGNOSIS — M54.50 ACUTE BILATERAL LOW BACK PAIN WITHOUT SCIATICA: ICD-10-CM

## 2021-12-15 DIAGNOSIS — F41.1 GAD (GENERALIZED ANXIETY DISORDER): ICD-10-CM

## 2021-12-15 PROCEDURE — G8482 FLU IMMUNIZE ORDER/ADMIN: HCPCS | Performed by: FAMILY MEDICINE

## 2021-12-15 PROCEDURE — G8427 DOCREV CUR MEDS BY ELIG CLIN: HCPCS | Performed by: FAMILY MEDICINE

## 2021-12-15 PROCEDURE — 1036F TOBACCO NON-USER: CPT | Performed by: FAMILY MEDICINE

## 2021-12-15 PROCEDURE — 99214 OFFICE O/P EST MOD 30 MIN: CPT | Performed by: FAMILY MEDICINE

## 2021-12-15 PROCEDURE — G8420 CALC BMI NORM PARAMETERS: HCPCS | Performed by: FAMILY MEDICINE

## 2021-12-15 RX ORDER — MONTELUKAST SODIUM 5 MG/1
TABLET, CHEWABLE ORAL
COMMUNITY
Start: 2021-10-28 | End: 2021-12-15

## 2021-12-15 RX ORDER — PSEUDOEPHEDRINE HYDROCHLORIDE 30 MG/1
30 TABLET ORAL DAILY
COMMUNITY
End: 2021-12-22

## 2021-12-15 RX ORDER — LIDOCAINE 50 MG/G
OINTMENT TOPICAL
Qty: 50 G | Refills: 11 | Status: SHIPPED | OUTPATIENT
Start: 2021-12-15

## 2021-12-15 RX ORDER — TRIAMCINOLONE ACETONIDE 5 MG/G
CREAM TOPICAL
Qty: 15 G | Refills: 1 | Status: SHIPPED | OUTPATIENT
Start: 2021-12-15

## 2021-12-15 ASSESSMENT — ENCOUNTER SYMPTOMS
VOMITING: 0
DIARRHEA: 0
BACK PAIN: 0
NAUSEA: 0
COUGH: 0
SHORTNESS OF BREATH: 0
WHEEZING: 0
CONSTIPATION: 0
ABDOMINAL PAIN: 0

## 2021-12-15 NOTE — PROGRESS NOTES
12/15/21  Katya Be : 1970 Sex: female  Age: 54 y.o. Chief Complaint   Patient presents with    Blood Pressure Check     pt just started BP med last friday     HPI:  54 y.o. female presents today for 2 month follow up of chronic medical conditions, medication refills. Patient's chart, medical, surgical and medication history all reviewed. Hypothyroidism  Patient presents for routine follow up of Hypothyroidism. Current symptoms: none. Patient denies change in energy level, diarrhea, heat / cold intolerance, nervousness, palpitations and weight changes. Symptoms have been well-controlled. No difficulty swallowing or masses felt. Last TSH was 3.890. Patient is currently taking Levothyroxine 100 mcg. She has history of CVID. Takes Biaxin daily. Follows with Dr. Jerome Song for management. Hypertension   The patient presents today for follow up of HTN. The problem is poorly controlled and worsening. Risk factors for coronary artery disease include Age > 30 and HTN. Current treatments include Metoprolol- patient only just started taking on Friday because dentist won't do veneers otherwise. Lifestyle changes the patient has made include none. Today the patient is complaining of none. ROS:  Review of Systems   Constitutional: Negative for chills, fatigue and fever. Respiratory: Negative for cough, shortness of breath and wheezing. Cardiovascular: Negative for chest pain and palpitations. Gastrointestinal: Negative for abdominal pain, constipation, diarrhea, nausea and vomiting. Musculoskeletal: Negative for arthralgias and back pain. Skin: Negative for rash. Allergic/Immunologic: Positive for environmental allergies and immunocompromised state. Neurological: Negative for dizziness and headaches. Psychiatric/Behavioral: Negative for dysphoric mood. The patient is nervous/anxious. All other systems reviewed and are negative.        Current Outpatient Medications on File Prior to Visit   Medication Sig Dispense Refill    pseudoephedrine (SUDAFED) 30 MG tablet Take 30 mg by mouth daily      RAMILA 1/20 1-20 MG-MCG per tablet take 1 tablet by mouth once daily as directed 21 tablet 11    levothyroxine (SYNTHROID) 100 MCG tablet take 1 tablet by mouth once daily 90 tablet 1    metoprolol succinate (TOPROL XL) 25 MG extended release tablet Take 1 tablet by mouth daily 90 tablet 1    FLUoxetine (PROZAC) 20 MG capsule take 1 capsule by mouth three times a day 270 capsule 1    clarithromycin (BIAXIN) 250 MG tablet Take 250 mg by mouth daily      Cholecalciferol (VITAMIN D3) 25 MCG (1000 UT) CAPS Take by mouth      Turmeric Curcumin 500 MG CAPS Take by mouth      Multiple Vitamins-Minerals (ONE-A-DAY WOMENS PO) Take by mouth      Biotin 1000 MCG CHEW Take by mouth      SYMBICORT 160-4.5 MCG/ACT AERO inhale 1 to 2 puffs by mouth every 12 hours Rinse mouth after use      fluticasone (FLONASE) 50 MCG/ACT nasal spray instill 1 to 2 sprays into each nostril every morning      montelukast (SINGULAIR) 10 MG tablet take 1 tablet by mouth at bedtime      Probiotic Product (PROBIOTIC DAILY PO) Take by mouth LD 4/6/16      Omega-3 Fatty Acids (FISH OIL PO) Take by mouth LD 4/6/16      azelastine (ASTELIN) 137 MCG/SPRAY nasal spray 2 sprays by Nasal route nightly. Use in each nostril as directed       triamcinolone (ARISTOCORT) 0.5 % cream Apply topically 3 times daily. 15 g 1     No current facility-administered medications on file prior to visit.        Allergies   Allergen Reactions    Prednisone        Past Medical History:   Diagnosis Date    Anxiety     Common variable immunodeficiency ([facility]     FU with Dr. Paz Blankenship, no recent issues, stable     Environmental allergies     Kidney stone     Thyroid disease      Past Surgical History:   Procedure Laterality Date    COLONOSCOPY  04/11/2016    LITHOTRIPSY  2019    UPPER GASTROINTESTINAL ENDOSCOPY 04/11/2016    WISDOM TOOTH EXTRACTION       History reviewed. No pertinent family history. Social History     Socioeconomic History    Marital status: Single     Spouse name: Not on file    Number of children: Not on file    Years of education: Not on file    Highest education level: Not on file   Occupational History    Not on file   Tobacco Use    Smoking status: Never Smoker    Smokeless tobacco: Never Used   Substance and Sexual Activity    Alcohol use: No    Drug use: No    Sexual activity: Not on file   Other Topics Concern    Not on file   Social History Narrative    Not on file     Social Determinants of Health     Financial Resource Strain:     Difficulty of Paying Living Expenses: Not on file   Food Insecurity:     Worried About Running Out of Food in the Last Year: Not on file    Dinorah of Food in the Last Year: Not on file   Transportation Needs:     Lack of Transportation (Medical): Not on file    Lack of Transportation (Non-Medical):  Not on file   Physical Activity:     Days of Exercise per Week: Not on file    Minutes of Exercise per Session: Not on file   Stress:     Feeling of Stress : Not on file   Social Connections:     Frequency of Communication with Friends and Family: Not on file    Frequency of Social Gatherings with Friends and Family: Not on file    Attends Rastafarian Services: Not on file    Active Member of [address] [place] [address] or Organizations: Not on file    Attends Club or Organization Meetings: Not on file    Marital Status: Not on file   Intimate Partner Violence:     Fear of Current or Ex-Partner: Not on file    Emotionally Abused: Not on file    Physically Abused: Not on file    Sexually Abused: Not on file   Housing Stability:     Unable to Pay for Housing in the Last Year: Not on file    Number of [place] in the Last Year: Not on file    Unstable Housing in the Last Year: Not on file       Vitals:    12/15/21 1105   BP: (!) 154/102   Pulse: 88   Temp: 97.7 °F (36.5 °C)   SpO2: 99%   Weight: 139 lb 12.8 oz (63.4 kg)   Height: 5' 3\" (1.6 m)       Physical Exam:  Physical Exam  Vitals and nursing note reviewed. Constitutional:       General: She is not in acute distress. Appearance: Normal appearance. She is well-developed and normal weight. She is not ill-appearing. HENT:      Head: Normocephalic and atraumatic. Right Ear: Hearing and external ear normal.      Left Ear: Hearing and external ear normal.      Nose:      Comments: Wearing mask  Eyes:      General: Lids are normal. No scleral icterus. Extraocular Movements: Extraocular movements intact. Conjunctiva/sclera: Conjunctivae normal.   Neck:      Thyroid: No thyromegaly. Cardiovascular:      Rate and Rhythm: Normal rate and regular rhythm. Heart sounds: Normal heart sounds. No murmur heard. Pulmonary:      Effort: Pulmonary effort is normal. No respiratory distress. Breath sounds: Normal breath sounds. No wheezing. Musculoskeletal:         General: No tenderness or deformity. Normal range of motion. Cervical back: Normal range of motion and neck supple. Right lower leg: No edema. Left lower leg: No edema. Lymphadenopathy:      Cervical: No cervical adenopathy. Skin:     General: Skin is warm and dry. Findings: Rash (macular rash on bilateral shins) present. Neurological:      General: No focal deficit present. Mental Status: She is alert and oriented to person, place, and time. Gait: Gait normal.   Psychiatric:         Mood and Affect: Affect normal. Mood is anxious. Speech: Speech normal.         Behavior: Behavior normal.         Thought Content:  Thought content normal.         Labs:  CBC with Differential:    Lab Results   Component Value Date    WBC 5.6 10/12/2021    RBC 5.15 10/12/2021    HGB 15.4 10/12/2021    HCT 47.0 10/12/2021     10/12/2021    MCV 91.3 10/12/2021    MCH 29.9 10/12/2021    MCHC 32.8 10/12/2021 (XYLOCAINE) 5 % ointment; Apply topically as needed. Rash and nonspecific skin eruption  -     triamcinolone (ARISTOCORT) 0.5 % cream; Apply topically 3 times daily. Return in about 1 week (around 12/22/2021), or if symptoms worsen or fail to improve, for Recheck.       Seen By:  Britany Bradshaw,

## 2021-12-15 NOTE — PROGRESS NOTES
10/12/21  Joel Wong : 1970 Sex: female  Age: 54 y.o. Chief Complaint   Patient presents with    Blood Pressure Check     pt just started BP med last friday     HPI:  54 y.o. female presents today for 4 month follow up of chronic medical conditions, medication refills and FBW. Patient's chart, medical, surgical and medication history all reviewed. Hypothyroidism  Patient presents for routine follow up of Hypothyroidism. Current symptoms: none. Patient denies change in energy level, diarrhea, heat / cold intolerance, nervousness, palpitations and weight changes. Symptoms have been well-controlled. No difficulty swallowing or masses felt. Last TSH was 1.540. Patient is currently taking Levothyroxine 100 mcg. She has history of CVID. Takes Biaxin daily. Follows with Dr. Swati Pierre for management. Hypertension   The patient presents today for follow up of HTN. The problem is poorly controlled and worsening. Risk factors for coronary artery disease include Age > 30 and HTN. Current treatments include none. Lifestyle changes the patient has made include none. Today the patient is complaining of none. ROS:  Review of Systems   Constitutional: Negative for chills, fatigue and fever. Respiratory: Negative for cough, shortness of breath and wheezing. Cardiovascular: Negative for chest pain and palpitations. Gastrointestinal: Negative for abdominal pain, constipation, diarrhea, nausea and vomiting. Musculoskeletal: Negative for arthralgias and back pain. Skin: Negative for rash. Allergic/Immunologic: Positive for environmental allergies and immunocompromised state. Neurological: Negative for dizziness and headaches. Psychiatric/Behavioral: Negative for dysphoric mood. The patient is not nervous/anxious. All other systems reviewed and are negative.        Current Outpatient Medications on File Prior to Visit   Medication Sig Dispense Refill    pseudoephedrine (SUDAFED) 30 MG tablet Take 30 mg by mouth daily      RAMILA 1/20 1-20 MG-MCG per tablet take 1 tablet by mouth once daily as directed 21 tablet 11    levothyroxine (SYNTHROID) 100 MCG tablet take 1 tablet by mouth once daily 90 tablet 1    metoprolol succinate (TOPROL XL) 25 MG extended release tablet Take 1 tablet by mouth daily 90 tablet 1    FLUoxetine (PROZAC) 20 MG capsule take 1 capsule by mouth three times a day 270 capsule 1    lidocaine (XYLOCAINE) 5 % ointment Apply topically as needed. 50 g 5    clarithromycin (BIAXIN) 250 MG tablet Take 250 mg by mouth daily      Cholecalciferol (VITAMIN D3) 25 MCG (1000 UT) CAPS Take by mouth      Turmeric Curcumin 500 MG CAPS Take by mouth      Multiple Vitamins-Minerals (ONE-A-DAY WOMENS PO) Take by mouth      Biotin 1000 MCG CHEW Take by mouth      SYMBICORT 160-4.5 MCG/ACT AERO inhale 1 to 2 puffs by mouth every 12 hours Rinse mouth after use      fluticasone (FLONASE) 50 MCG/ACT nasal spray instill 1 to 2 sprays into each nostril every morning      montelukast (SINGULAIR) 10 MG tablet take 1 tablet by mouth at bedtime      Probiotic Product (PROBIOTIC DAILY PO) Take by mouth LD 4/6/16      Omega-3 Fatty Acids (FISH OIL PO) Take by mouth LD 4/6/16      azelastine (ASTELIN) 137 MCG/SPRAY nasal spray 2 sprays by Nasal route nightly. Use in each nostril as directed       triamcinolone (ARISTOCORT) 0.5 % cream Apply topically 3 times daily. 15 g 1     No current facility-administered medications on file prior to visit.        Allergies   Allergen Reactions    Prednisone        Past Medical History:   Diagnosis Date    Anxiety     Common variable immunodeficiency (HCC)     FU with Dr. Frandy Schaffer, no recent issues, stable     Environmental allergies     Kidney stone     Thyroid disease      Past Surgical History:   Procedure Laterality Date    COLONOSCOPY  04/11/2016    LITHOTRIPSY  2019    UPPER GASTROINTESTINAL ENDOSCOPY  04/11/2016    JJ TOOTH EXTRACTION       No family history on file. Social History     Socioeconomic History    Marital status: Single     Spouse name: Not on file    Number of children: Not on file    Years of education: Not on file    Highest education level: Not on file   Occupational History    Not on file   Tobacco Use    Smoking status: Never Smoker    Smokeless tobacco: Never Used   Substance and Sexual Activity    Alcohol use: No    Drug use: No    Sexual activity: Not on file   Other Topics Concern    Not on file   Social History Narrative    Not on file     Social Determinants of Health     Financial Resource Strain:     Difficulty of Paying Living Expenses: Not on file   Food Insecurity:     Worried About Running Out of Food in the Last Year: Not on file    Dinorah of Food in the Last Year: Not on file   Transportation Needs:     Lack of Transportation (Medical): Not on file    Lack of Transportation (Non-Medical):  Not on file   Physical Activity:     Days of Exercise per Week: Not on file    Minutes of Exercise per Session: Not on file   Stress:     Feeling of Stress : Not on file   Social Connections:     Frequency of Communication with Friends and Family: Not on file    Frequency of Social Gatherings with Friends and Family: Not on file    Attends Jainism Services: Not on file    Active Member of [employer] or Organizations: Not on file    Attends Club or Organization Meetings: Not on file    Marital Status: Not on file   Intimate Partner Violence:     Fear of Current or Ex-Partner: Not on file    Emotionally Abused: Not on file    Physically Abused: Not on file    Sexually Abused: Not on file   Housing Stability:     Unable to Pay for Housing in the Last Year: Not on file    Number of [place] in the Last Year: Not on file    Unstable Housing in the Last Year: Not on file       Vitals:    12/15/21 1105   BP: (!) 154/102   Pulse: 88   Temp: 97.7 °F (36.5 °C)   SpO2: 99%   Weight: 139 lb 12.8 oz (63.4 kg)   Height: 5' 3\" (1.6 m)       Physical Exam:  Physical Exam  Vitals and nursing note reviewed. Constitutional:       General: She is not in acute distress. Appearance: Normal appearance. She is well-developed and normal weight. She is not ill-appearing. HENT:      Head: Normocephalic and atraumatic. Right Ear: Hearing and external ear normal.      Left Ear: Hearing and external ear normal.      Nose:      Comments: Wearing mask  Eyes:      General: Lids are normal. No scleral icterus. Extraocular Movements: Extraocular movements intact. Conjunctiva/sclera: Conjunctivae normal.   Neck:      Thyroid: No thyromegaly. Cardiovascular:      Rate and Rhythm: Normal rate and regular rhythm. Heart sounds: Normal heart sounds. No murmur heard. Pulmonary:      Effort: Pulmonary effort is normal. No respiratory distress. Breath sounds: Normal breath sounds. No wheezing. Musculoskeletal:         General: No tenderness or deformity. Normal range of motion. Cervical back: Normal range of motion and neck supple. Right lower leg: No edema. Left lower leg: No edema. Lymphadenopathy:      Cervical: No cervical adenopathy. Skin:     General: Skin is warm and dry. Findings: No rash. Neurological:      General: No focal deficit present. Mental Status: She is alert and oriented to person, place, and time. Gait: Gait normal.   Psychiatric:         Mood and Affect: Affect normal. Mood is anxious. Speech: Speech normal.         Behavior: Behavior normal.         Thought Content:  Thought content normal.         Labs:  CBC with Differential:    Lab Results   Component Value Date    WBC 5.6 10/12/2021    RBC 5.15 10/12/2021    HGB 15.4 10/12/2021    HCT 47.0 10/12/2021     10/12/2021    MCV 91.3 10/12/2021    MCH 29.9 10/12/2021    MCHC 32.8 10/12/2021    RDW 12.1 10/12/2021    SEGSPCT 63 09/05/2012    LYMPHOPCT 42.1 10/12/2021 MONOPCT 7.7 10/12/2021    BASOPCT 0.7 10/12/2021    MONOSABS 0.43 10/12/2021    LYMPHSABS 2.35 10/12/2021    EOSABS 0.12 10/12/2021    BASOSABS 0.04 10/12/2021     CMP:    Lab Results   Component Value Date     10/12/2021    K 4.0 10/12/2021    K 3.6 02/17/2020     10/12/2021    CO2 25 10/12/2021    BUN 15 10/12/2021    CREATININE 0.9 10/12/2021    GFRAA >60 10/12/2021    LABGLOM >60 10/12/2021    GLUCOSE 85 10/12/2021    PROT 6.7 10/12/2021    LABALBU 4.0 10/12/2021    CALCIUM 9.8 10/12/2021    BILITOT 0.5 10/12/2021    ALKPHOS 50 10/12/2021    AST 20 10/12/2021    ALT 16 10/12/2021     U/A:    Lab Results   Component Value Date    COLORU Yellow 10/12/2021    PROTEINU Negative 10/12/2021    PHUR 6.0 10/12/2021    LABCAST MODERATE 02/22/2016    WBCUA 1-3 04/09/2021    RBCUA 0-1 04/09/2021    RBCUA 0-1 09/05/2012    BACTERIA MANY 04/09/2021    CLARITYU Clear 10/12/2021    SPECGRAV >=1.030 10/12/2021    LEUKOCYTESUR Negative 10/12/2021    UROBILINOGEN 0.2 10/12/2021    BILIRUBINUR Negative 10/12/2021    BLOODU Negative 10/12/2021    GLUCOSEU Negative 10/12/2021    AMORPHOUS FEW 02/22/2016     HgBA1c:    Lab Results   Component Value Date    LABA1C 5.1 10/12/2021     FLP:    Lab Results   Component Value Date    TRIG 71 10/12/2021    HDL 84 10/12/2021    LDLCALC 76 10/12/2021    LABVLDL 14 10/12/2021     TSH:    Lab Results   Component Value Date    TSH 3.890 10/12/2021          Assessment and Plan:  Cj Tang was seen today for medication refill and hypothyroidism. Diagnoses and all orders for this visit:    Primary hypertension  -     metoprolol succinate (TOPROL XL) 25 MG extended release tablet; Take 1 tablet by mouth daily  BP remains poorly controlled. Continues to worsen at each visit. Explained that even if it is \"white coat syndrome\", we cannot allow it to spike that high. Will start with Metoprolol due to elevated HR as well. Due for fasting labs.      DESIRAE (generalized anxiety disorder)  - CBC Auto Differential; Future  -     Comprehensive Metabolic Panel; Future  -     TSH without Reflex; Future  -     Urinalysis; Future  -     T4, Free; Future    Acquired hypothyroidism  -     levothyroxine (SYNTHROID) 100 MCG tablet; take 1 tablet by mouth once daily  -     CBC Auto Differential; Future  -     Comprehensive Metabolic Panel; Future  -     TSH without Reflex; Future  -     Urinalysis; Future  -     T4, Free; Future  Due for recheck    CVID (common variable immunodeficiency) ([facility]  Follows with ID    Impaired fasting glucose  -     Hemoglobin A1C; Future    Vitamin D insufficiency  -     Vitamin D 25 Hydroxy; Future    Screening for cardiovascular condition  -     Lipid Panel; Future    Encounter for surveillance of contraceptive pills  -     RAMILA 1/20 1-20 MG-MCG per tablet; take 1 tablet by mouth once daily as directed          No follow-ups on file.       Seen By:  Julisa Roth DO

## 2021-12-22 ENCOUNTER — OFFICE VISIT (OUTPATIENT)
Dept: PRIMARY CARE CLINIC | Age: 51
End: 2021-12-22
Payer: COMMERCIAL

## 2021-12-22 VITALS
HEART RATE: 62 BPM | OXYGEN SATURATION: 96 % | BODY MASS INDEX: 24.41 KG/M2 | HEIGHT: 64 IN | SYSTOLIC BLOOD PRESSURE: 120 MMHG | TEMPERATURE: 97.3 F | DIASTOLIC BLOOD PRESSURE: 90 MMHG | WEIGHT: 143 LBS

## 2021-12-22 DIAGNOSIS — R21 RASH AND NONSPECIFIC SKIN ERUPTION: ICD-10-CM

## 2021-12-22 DIAGNOSIS — I10 PRIMARY HYPERTENSION: Primary | ICD-10-CM

## 2021-12-22 DIAGNOSIS — E03.9 ACQUIRED HYPOTHYROIDISM: ICD-10-CM

## 2021-12-22 PROCEDURE — 1036F TOBACCO NON-USER: CPT | Performed by: FAMILY MEDICINE

## 2021-12-22 PROCEDURE — G8482 FLU IMMUNIZE ORDER/ADMIN: HCPCS | Performed by: FAMILY MEDICINE

## 2021-12-22 PROCEDURE — G8427 DOCREV CUR MEDS BY ELIG CLIN: HCPCS | Performed by: FAMILY MEDICINE

## 2021-12-22 PROCEDURE — 99214 OFFICE O/P EST MOD 30 MIN: CPT | Performed by: FAMILY MEDICINE

## 2021-12-22 PROCEDURE — G8420 CALC BMI NORM PARAMETERS: HCPCS | Performed by: FAMILY MEDICINE

## 2021-12-22 RX ORDER — LIDOCAINE 50 MG/G
OINTMENT TOPICAL
Qty: 3 EACH | Refills: 1 | Status: CANCELLED | OUTPATIENT
Start: 2021-12-22

## 2021-12-22 RX ORDER — CLOBETASOL PROPIONATE 0.5 MG/G
OINTMENT TOPICAL
Qty: 60 G | Refills: 1 | Status: SHIPPED | OUTPATIENT
Start: 2021-12-22

## 2021-12-22 RX ORDER — METOPROLOL SUCCINATE 50 MG/1
50 TABLET, EXTENDED RELEASE ORAL DAILY
Qty: 90 TABLET | Refills: 1 | Status: SHIPPED | OUTPATIENT
Start: 2021-12-22

## 2021-12-22 ASSESSMENT — ENCOUNTER SYMPTOMS
BACK PAIN: 0
COUGH: 0
DIARRHEA: 0
CONSTIPATION: 0
VOMITING: 0
NAUSEA: 0
WHEEZING: 0
ABDOMINAL PAIN: 0
SHORTNESS OF BREATH: 0

## 2021-12-22 NOTE — PROGRESS NOTES
21  Joel Wong : 1970 Sex: female  Age: 54 y.o. Chief Complaint   Patient presents with    Rash     on legs would like lidocaine cream refilled     Hypertension     HPI:  54 y.o. female presents today for 1 week follow up of chronic medical conditions, medication refills. Patient's chart, medical, surgical and medication history all reviewed. Hypothyroidism  Patient presents for routine follow up of Hypothyroidism. Current symptoms: none. Patient denies change in energy level, diarrhea, heat / cold intolerance, nervousness, palpitations and weight changes. Symptoms have been well-controlled. No difficulty swallowing or masses felt. Last TSH was 3.890. Patient is currently taking Levothyroxine 100 mcg. She has history of CVID. Takes Biaxin daily. Follows with Dr. Swati Pierre for management. Hypertension   The patient presents today for follow up of HTN. The problem is well controlled, improved. Risk factors for coronary artery disease include Age > 30 and HTN. Current treatments include Metoprolol. Lifestyle changes the patient has made include none. Today the patient is complaining of none. ROS:  Review of Systems   Constitutional: Negative for chills, fatigue and fever. Respiratory: Negative for cough, shortness of breath and wheezing. Cardiovascular: Negative for chest pain and palpitations. Gastrointestinal: Negative for abdominal pain, constipation, diarrhea, nausea and vomiting. Musculoskeletal: Negative for arthralgias and back pain. Skin: Negative for rash. Allergic/Immunologic: Positive for environmental allergies and immunocompromised state. Neurological: Negative for dizziness and headaches. Psychiatric/Behavioral: Negative for dysphoric mood. The patient is nervous/anxious. All other systems reviewed and are negative.        Current Outpatient Medications on File Prior to Visit   Medication Sig Dispense Refill    lidocaine (XYLOCAINE) 5 % ointment Apply topically as needed. 50 g 11    triamcinolone (ARISTOCORT) 0.5 % cream Apply topically 3 times daily. 15 g 1    RAMILA 1/20 1-20 MG-MCG per tablet take 1 tablet by mouth once daily as directed 21 tablet 11    levothyroxine (SYNTHROID) 100 MCG tablet take 1 tablet by mouth once daily 90 tablet 1    FLUoxetine (PROZAC) 20 MG capsule take 1 capsule by mouth three times a day 270 capsule 1    triamcinolone (ARISTOCORT) 0.5 % cream Apply topically 3 times daily. 15 g 1    clarithromycin (BIAXIN) 250 MG tablet Take 250 mg by mouth daily      Turmeric Curcumin 500 MG CAPS Take by mouth      Multiple Vitamins-Minerals (ONE-A-DAY WOMENS PO) Take by mouth      Biotin 1000 MCG CHEW Take by mouth      SYMBICORT 160-4.5 MCG/ACT AERO inhale 1 to 2 puffs by mouth every 12 hours Rinse mouth after use      fluticasone (FLONASE) 50 MCG/ACT nasal spray instill 1 to 2 sprays into each nostril every morning      montelukast (SINGULAIR) 10 MG tablet take 1 tablet by mouth at bedtime      Probiotic Product (PROBIOTIC DAILY PO) Take by mouth LD 4/6/16      Omega-3 Fatty Acids (FISH OIL PO) Take by mouth LD 4/6/16      azelastine (ASTELIN) 137 MCG/SPRAY nasal spray 2 sprays by Nasal route nightly. Use in each nostril as directed        No current facility-administered medications on file prior to visit. Allergies   Allergen Reactions    Prednisone        Past Medical History:   Diagnosis Date    Anxiety     Common variable immunodeficiency ([facility] [address]     FU with Dr. Sarika Moctezuma, no recent issues, stable     Environmental allergies     Kidney stone     Thyroid disease      Past Surgical History:   Procedure Laterality Date    COLONOSCOPY  04/11/2016    LITHOTRIPSY  2019    UPPER GASTROINTESTINAL ENDOSCOPY  04/11/2016    WISDOM TOOTH EXTRACTION       History reviewed. No pertinent family history.   Social History     Socioeconomic History    Marital status: Single     Spouse name: Not on file    Number of children: Not on file    Years of education: Not on file    Highest education level: Not on file   Occupational History    Not on file   Tobacco Use    Smoking status: Never Smoker    Smokeless tobacco: Never Used   Substance and Sexual Activity    Alcohol use: No    Drug use: No    Sexual activity: Not on file   Other Topics Concern    Not on file   Social History Narrative    Not on file     Social Determinants of Health     Financial Resource Strain:     Difficulty of Paying Living Expenses: Not on file   Food Insecurity:     Worried About Running Out of Food in the Last Year: Not on file    [place]or[place] of Food in the Last Year: Not on file   Transportation Needs:     Lack of Transportation (Medical): Not on file    Lack of Transportation (Non-Medical): Not on file   Physical Activity:     Days of Exercise per Week: Not on file    Minutes of Exercise per Session: Not on file   Stress:     Feeling of Stress : Not on file   Social Connections:     Frequency of Communication with Friends and Family: Not on file    Frequency of Social Gatherings with Friends and Family: Not on file    Attends Faith Services: Not on file    Active Member of [address] [place] [address] or Organizations: Not on file    Attends Club or Organization Meetings: Not on file    Marital Status: Not on file   Intimate Partner Violence:     Fear of Current or Ex-Partner: Not on file    Emotionally Abused: Not on file    Physically Abused: Not on file    Sexually Abused: Not on file   Housing Stability:     Unable to Pay for Housing in the Last Year: Not on file    Number of [place] in the Last Year: Not on file    Unstable Housing in the Last Year: Not on file       Vitals:    12/22/21 1203   BP: (!) 120/90   Pulse: 62   Temp: 97.3 °F (36.3 °C)   SpO2: 96%   Weight: 143 lb (64.9 kg)   Height: 5' 3.5\" (1.613 m)       Physical Exam:  Physical Exam  Vitals and nursing note reviewed.    Constitutional:       General: She is not in acute distress. Appearance: Normal appearance. She is well-developed and normal weight. She is not ill-appearing. HENT:      Head: Normocephalic and atraumatic. Right Ear: Hearing and external ear normal.      Left Ear: Hearing and external ear normal.      Nose:      Comments: Wearing mask  Eyes:      General: Lids are normal. No scleral icterus. Extraocular Movements: Extraocular movements intact. Conjunctiva/sclera: Conjunctivae normal.   Neck:      Thyroid: No thyromegaly. Cardiovascular:      Rate and Rhythm: Normal rate and regular rhythm. Heart sounds: Normal heart sounds. No murmur heard. Pulmonary:      Effort: Pulmonary effort is normal. No respiratory distress. Breath sounds: Normal breath sounds. No wheezing. Musculoskeletal:         General: No tenderness or deformity. Normal range of motion. Cervical back: Normal range of motion and neck supple. Right lower leg: No edema. Left lower leg: No edema. Lymphadenopathy:      Cervical: No cervical adenopathy. Skin:     General: Skin is warm and dry. Findings: Rash (macular rash on bilateral shins) present. Neurological:      General: No focal deficit present. Mental Status: She is alert and oriented to person, place, and time. Gait: Gait normal.   Psychiatric:         Mood and Affect: Affect normal. Mood is anxious. Speech: Speech normal.         Behavior: Behavior normal.         Thought Content:  Thought content normal.         Labs:  CBC with Differential:    Lab Results   Component Value Date    WBC 5.6 10/12/2021    RBC 5.15 10/12/2021    HGB 15.4 10/12/2021    HCT 47.0 10/12/2021     10/12/2021    MCV 91.3 10/12/2021    MCH 29.9 10/12/2021    MCHC 32.8 10/12/2021    RDW 12.1 10/12/2021    SEGSPCT 63 09/05/2012    LYMPHOPCT 42.1 10/12/2021    MONOPCT 7.7 10/12/2021    BASOPCT 0.7 10/12/2021    MONOSABS 0.43 10/12/2021    LYMPHSABS 2.35 10/12/2021    EOSABS 0.12 10/12/2021    BASOSABS 0.04 10/12/2021     CMP:    Lab Results   Component Value Date     10/12/2021    K 4.0 10/12/2021    K 3.6 02/17/2020     10/12/2021    CO2 25 10/12/2021    BUN 15 10/12/2021    CREATININE 0.9 10/12/2021    GFRAA >60 10/12/2021    LABGLOM >60 10/12/2021    GLUCOSE 85 10/12/2021    PROT 6.7 10/12/2021    LABALBU 4.0 10/12/2021    CALCIUM 9.8 10/12/2021    BILITOT 0.5 10/12/2021    ALKPHOS 50 10/12/2021    AST 20 10/12/2021    ALT 16 10/12/2021     U/A:    Lab Results   Component Value Date    COLORU Yellow 10/12/2021    PROTEINU Negative 10/12/2021    PHUR 6.0 10/12/2021    LABCAST MODERATE 02/22/2016    WBCUA 1-3 04/09/2021    RBCUA 0-1 04/09/2021    RBCUA 0-1 09/05/2012    BACTERIA MANY 04/09/2021    CLARITYU Clear 10/12/2021    SPECGRAV >=1.030 10/12/2021    LEUKOCYTESUR Negative 10/12/2021    UROBILINOGEN 0.2 10/12/2021    BILIRUBINUR Negative 10/12/2021    BLOODU Negative 10/12/2021    GLUCOSEU Negative 10/12/2021    AMORPHOUS FEW 02/22/2016     HgBA1c:    Lab Results   Component Value Date    LABA1C 5.1 10/12/2021     FLP:    Lab Results   Component Value Date    TRIG 71 10/12/2021    HDL 84 10/12/2021    LDLCALC 76 10/12/2021    LABVLDL 14 10/12/2021     TSH:    Lab Results   Component Value Date    TSH 3.890 10/12/2021          Assessment and Plan:  Leyla Klein was seen today for rash and hypertension. Diagnoses and all orders for this visit:    Primary hypertension  -     metoprolol succinate (TOPROL XL) 50 MG extended release tablet; Take 1 tablet by mouth daily  Much better control with stopping Sudafed and taking 50 mg of Metoprolol nightly. Medically optimized for Veneers at this time. Acquired hypothyroidism  Stable    Rash and nonspecific skin eruption  -     clobetasol (TEMOVATE) 0.05 % ointment; Apply topically 2 times daily. Triamcinolone not as effective as it could be. WIll try higher potency.       Return in about 2 months (around 2/22/2022), or if symptoms worsen or fail to improve, for Recheck.       Seen By:  Tuan Espinosa, DO

## 2022-01-04 DIAGNOSIS — Z91.09 ENVIRONMENTAL ALLERGIES: ICD-10-CM

## 2022-01-04 RX ORDER — CETIRIZINE HYDROCHLORIDE 10 MG/1
10 TABLET ORAL DAILY
Qty: 90 TABLET | Refills: 1 | Status: SHIPPED
Start: 2022-01-04 | End: 2022-02-02

## 2022-01-04 NOTE — TELEPHONE ENCOUNTER
----- Message from Mirtha Ask sent at 1/4/2022 10:45 AM EST -----  Subject: Refill Request    QUESTIONS  Name of Medication? Other - cetirizine  Patient-reported dosage and instructions? 10mg once a day at night   How many days do you have left? 5  Preferred Pharmacy? [address] phone number (if available)? [phone]  ---------------------------------------------------------------------------  --------------  CALL BACK INFO  What is the best way for the office to contact you? OK to leave message on   voicemail  Preferred Call Back Phone Number?  [phone]

## 2022-01-10 ENCOUNTER — TELEPHONE (OUTPATIENT)
Dept: PRIMARY CARE CLINIC | Age: 52
End: 2022-01-10

## 2022-01-10 NOTE — TELEPHONE ENCOUNTER
Pt calling stating she was given Ibuprofen 800mg TID in urgent care and asking if ok to take with her BP meds

## 2022-01-19 ENCOUNTER — TELEPHONE (OUTPATIENT)
Dept: PRIMARY CARE CLINIC | Age: 52
End: 2022-01-19

## 2022-01-19 NOTE — TELEPHONE ENCOUNTER
----- Message from Jos Do sent at 1/19/2022 12:57 PM EST -----  Subject: Message to Provider    QUESTIONS  Information for Provider? Pt called requesting a anti-inflammatory pain   reliever that wont interfere with BP medication for her arm injury. She   was given Ibuprofen but it interferes with her medication. Pt states that   Tylenol doesn't help with the pain. Please call with alternative   medication.   ---------------------------------------------------------------------------  --------------  CALL BACK INFO  What is the best way for the office to contact you? OK to leave message on   voicemail  Preferred Call Back Phone Number? [phone]  ---------------------------------------------------------------------------  --------------  SCRIPT ANSWERS  Relationship to Patient?  Self

## 2022-01-28 ENCOUNTER — TELEPHONE (OUTPATIENT)
Dept: PRIMARY CARE CLINIC | Age: 52
End: 2022-01-28

## 2022-01-28 NOTE — TELEPHONE ENCOUNTER
----- Message from [address] sent at 1/28/2022  9:22 AM EST -----  Subject: Appointment Request    Reason for Call: Urgent Back Neck Pain    QUESTIONS  Type of Appointment? Established Patient  Reason for appointment request? No appointments available during search  Additional Information for Provider? PT HAS APPT SCHEDULED FOR FEB 15 BUT   NEEDS IN SOONER DUE TO SEVERE BACK PAIN. WAS SEEN IN URGENT CARE AND WAS   SUSPECTED OF HAVING UTI BUT URINE CULTURE CAME BACK CLEAR.  ---------------------------------------------------------------------------  --------------  CALL BACK INFO  What is the best way for the office to contact you? OK to leave message on   voicemail  Preferred Call Back Phone Number? [phone]  ---------------------------------------------------------------------------  --------------  SCRIPT ANSWERS  Relationship to Patient? Self  Did you have an injury or trauma within the past 3 days? No  Are you having new problems with your bowel or bladder control? No  Are you having new onset numbness, tingling, or weakness in your arms   and/or legs with this pain? No  Did your pain begin within the past 14 days? Yes  Have you been diagnosed with, awaiting test results for, or told that you   are suspected of having COVID-19 (Coronavirus)? (If patient has tested   negative or was tested as a requirement for work, school, or travel and   not based on symptoms, answer no)? No  Within the past two weeks have you developed any of the following symptoms   (answer no if symptoms have been present longer than 2 weeks or began   more than 2 weeks ago)? Fever or Chills, Cough, Shortness of breath or   difficulty breathing, Loss of taste or smell, Sore throat, Nasal   congestion, Sneezing or runny nose, Fatigue or generalized body aches   (answer no if pain is specific to a body part e.g. back pain), Diarrhea,   Headache? No  Have you had close contact with someone with COVID-19 in the last 14 days? No  (Service Expert  click yes below to proceed with [employer]ual   Scheduling)?  Yes

## 2022-02-02 ENCOUNTER — TELEPHONE (OUTPATIENT)
Dept: PRIMARY CARE CLINIC | Age: 52
End: 2022-02-02

## 2022-02-02 ENCOUNTER — OFFICE VISIT (OUTPATIENT)
Dept: PRIMARY CARE CLINIC | Age: 52
End: 2022-02-02
Payer: COMMERCIAL

## 2022-02-02 VITALS
BODY MASS INDEX: 25.05 KG/M2 | HEIGHT: 63 IN | OXYGEN SATURATION: 100 % | WEIGHT: 141.4 LBS | SYSTOLIC BLOOD PRESSURE: 118 MMHG | DIASTOLIC BLOOD PRESSURE: 82 MMHG | TEMPERATURE: 98.8 F | HEART RATE: 72 BPM

## 2022-02-02 DIAGNOSIS — R21 RASH AND NONSPECIFIC SKIN ERUPTION: ICD-10-CM

## 2022-02-02 DIAGNOSIS — M54.50 ACUTE BILATERAL LOW BACK PAIN WITHOUT SCIATICA: ICD-10-CM

## 2022-02-02 DIAGNOSIS — F41.1 GAD (GENERALIZED ANXIETY DISORDER): ICD-10-CM

## 2022-02-02 DIAGNOSIS — I10 PRIMARY HYPERTENSION: Primary | ICD-10-CM

## 2022-02-02 DIAGNOSIS — E03.9 ACQUIRED HYPOTHYROIDISM: ICD-10-CM

## 2022-02-02 PROCEDURE — G8419 CALC BMI OUT NRM PARAM NOF/U: HCPCS | Performed by: FAMILY MEDICINE

## 2022-02-02 PROCEDURE — G8482 FLU IMMUNIZE ORDER/ADMIN: HCPCS | Performed by: FAMILY MEDICINE

## 2022-02-02 PROCEDURE — G8427 DOCREV CUR MEDS BY ELIG CLIN: HCPCS | Performed by: FAMILY MEDICINE

## 2022-02-02 PROCEDURE — 99214 OFFICE O/P EST MOD 30 MIN: CPT | Performed by: FAMILY MEDICINE

## 2022-02-02 PROCEDURE — 1036F TOBACCO NON-USER: CPT | Performed by: FAMILY MEDICINE

## 2022-02-02 RX ORDER — PHENAZOPYRIDINE HYDROCHLORIDE 200 MG/1
TABLET, FILM COATED ORAL
COMMUNITY
Start: 2022-01-25

## 2022-02-02 RX ORDER — CYCLOBENZAPRINE HCL 10 MG
10 TABLET ORAL NIGHTLY PRN
Qty: 30 TABLET | Refills: 0 | Status: SHIPPED
Start: 2022-02-02 | End: 2022-02-08 | Stop reason: SDUPTHER

## 2022-02-02 RX ORDER — IBUPROFEN 600 MG/1
TABLET ORAL
COMMUNITY
Start: 2022-01-10

## 2022-02-02 SDOH — ECONOMIC STABILITY: FOOD INSECURITY: WITHIN THE PAST 12 MONTHS, THE FOOD YOU BOUGHT JUST DIDN'T LAST AND YOU DIDN'T HAVE MONEY TO GET MORE.: NEVER TRUE

## 2022-02-02 SDOH — ECONOMIC STABILITY: FOOD INSECURITY: WITHIN THE PAST 12 MONTHS, YOU WORRIED THAT YOUR FOOD WOULD RUN OUT BEFORE YOU GOT MONEY TO BUY MORE.: NEVER TRUE

## 2022-02-02 ASSESSMENT — ENCOUNTER SYMPTOMS
BACK PAIN: 0
DIARRHEA: 0
WHEEZING: 0
COUGH: 0
SHORTNESS OF BREATH: 0
CONSTIPATION: 0
NAUSEA: 0
ABDOMINAL PAIN: 0
VOMITING: 0

## 2022-02-02 ASSESSMENT — SOCIAL DETERMINANTS OF HEALTH (SDOH): HOW HARD IS IT FOR YOU TO PAY FOR THE VERY BASICS LIKE FOOD, HOUSING, MEDICAL CARE, AND HEATING?: NOT HARD AT ALL

## 2022-02-02 NOTE — PROGRESS NOTES
21  Azalea Mancini : 1970 Sex: female  Age: 54 y.o. Chief Complaint   Patient presents with    Lower Back Pain     fell 3w ago, backs been hurting, took uti test at urgent care 21 it was negative.  Rash     3-4 days ago syptoms started, pain to the touch, irritated feels a little hot. HPI:  54 y.o. female presents today for acute visit due to low back pain and rash. Patient's chart, medical, surgical and medication history all reviewed. Hypertension   The patient presents today for follow up of HTN. The problem is well controlled, improved. Risk factors for coronary artery disease include Age > 30 and HTN. Current treatments include Metoprolol. Lifestyle changes the patient has made include none. Today the patient is complaining of none.         Back Pain  Patient also notes continued intermittent back pains. The pain started 3 month(s) ago.  The patient denies any trauma or injury.  [facility] and given NSAIDs and muscle relaxers.  She had to stop taking because they upset her stomach. The patient has been using Icy Hot, Lidocaine and heating pad at home with good relief of their symptoms.  She notes that her pain is now \"85-90%\" better.   She denies numbness, tingling or weakness to the extremities.  She denies any saddle anesthesia.  No bowel or bladder incontinence. No fever or chills. She has been going to PT- has one visit left. Thinks the pain is worsened by stress. ROS:  Review of Systems   Constitutional: Negative for chills, fatigue and fever. Respiratory: Negative for cough, shortness of breath and wheezing. Cardiovascular: Negative for chest pain and palpitations. Gastrointestinal: Negative for abdominal pain, constipation, diarrhea, nausea and vomiting. Musculoskeletal: Negative for arthralgias and back pain. Skin: Negative for rash. Allergic/Immunologic: Positive for environmental allergies and immunocompromised state. Neurological: Negative for dizziness and headaches. Psychiatric/Behavioral: Negative for dysphoric mood. The patient is nervous/anxious. All other systems reviewed and are negative. Current Outpatient Medications on File Prior to Visit   Medication Sig Dispense Refill    clobetasol (TEMOVATE) 0.05 % ointment Apply topically 2 times daily. 60 g 1    metoprolol succinate (TOPROL XL) 50 MG extended release tablet Take 1 tablet by mouth daily 90 tablet 1    lidocaine (XYLOCAINE) 5 % ointment Apply topically as needed. 50 g 11    triamcinolone (ARISTOCORT) 0.5 % cream Apply topically 3 times daily. 15 g 1    RAMILA 1/20 1-20 MG-MCG per tablet take 1 tablet by mouth once daily as directed 21 tablet 11    FLUoxetine (PROZAC) 20 MG capsule take 1 capsule by mouth three times a day 270 capsule 1    triamcinolone (ARISTOCORT) 0.5 % cream Apply topically 3 times daily. 15 g 1    clarithromycin (BIAXIN) 250 MG tablet Take 250 mg by mouth daily      Turmeric Curcumin 500 MG CAPS Take by mouth      Multiple Vitamins-Minerals (ONE-A-DAY WOMENS PO) Take by mouth      SYMBICORT 160-4.5 MCG/ACT AERO inhale 1 to 2 puffs by mouth every 12 hours Rinse mouth after use      fluticasone (FLONASE) 50 MCG/ACT nasal spray instill 1 to 2 sprays into each nostril every morning      montelukast (SINGULAIR) 10 MG tablet take 1 tablet by mouth at bedtime      Probiotic Product (PROBIOTIC DAILY PO) Take by mouth LD 4/6/16      Omega-3 Fatty Acids (FISH OIL PO) Take by mouth LD 4/6/16      azelastine (ASTELIN) 137 MCG/SPRAY nasal spray 2 sprays by Nasal route nightly.  Use in each nostril as directed       phenazopyridine (PYRIDIUM) 200 MG tablet take 1 tablet by mouth three times a day if needed for 3 days      ibuprofen (ADVIL;MOTRIN) 600 MG tablet TAKE 1 TABLET BY MOUTH THREE TIMES DAILY WITH FOOD AS NEEDED      levothyroxine (SYNTHROID) 100 MCG tablet take 1 tablet by mouth once daily 90 tablet 1    Biotin 1000 MCG CHEW Take by mouth (Patient not taking: Reported on 2/2/2022)       No current facility-administered medications on file prior to visit. Allergies   Allergen Reactions    Prednisone        Past Medical History:   Diagnosis Date    Anxiety     Common variable immunodeficiency ([facility] [address]     FU with Dr. Swati Guo, no recent issues, stable     Environmental allergies     Kidney stone     Thyroid disease      Past Surgical History:   Procedure Laterality Date    COLONOSCOPY  04/11/2016    LITHOTRIPSY  2019    UPPER GASTROINTESTINAL ENDOSCOPY  04/11/2016    WISDOM TOOTH EXTRACTION       No family history on file. Social History     Socioeconomic History    Marital status: Single     Spouse name: Not on file    Number of children: Not on file    Years of education: Not on file    Highest education level: Not on file   Occupational History    Not on file   Tobacco Use    Smoking status: Never Smoker    Smokeless tobacco: Never Used   Substance and Sexual Activity    Alcohol use: No    Drug use: No    Sexual activity: Not on file   Other Topics Concern    Not on file   Social History Narrative    Not on file     Social Determinants of Health     Financial Resource Strain: Low Risk     Difficulty of Paying Living Expenses: Not hard at all   Food Insecurity: No Food Insecurity    Worried About [facility] in the Last Year: Never true    [address] in the Last Year: Never true   Transportation Needs:     Lack of Transportation (Medical): Not on file    Lack of Transportation (Non-Medical):  Not on file   Physical Activity:     Days of Exercise per Week: Not on file    Minutes of Exercise per Session: Not on file   Stress:     Feeling of Stress : Not on file   Social Connections:     Frequency of Communication with Friends and Family: Not on file    Frequency of Social Gatherings with Friends and Family: Not on file    Attends Buddhist Services: Not on file   [employer] of [employer]s or Organizations: Not on file    Attends Club or Organization Meetings: Not on file    Marital Status: Not on file   Intimate Partner Violence:     Fear of Current or Ex-Partner: Not on file    Emotionally Abused: Not on file    Physically Abused: Not on file    Sexually Abused: Not on file   Housing Stability:     Unable to Pay for Housing in the Last Year: Not on file    Number of [place] in the Last Year: Not on file    Unstable Housing in the Last Year: Not on file       Vitals:    02/02/22 0902   BP: 118/82   Pulse: 72   Temp: 98.8 °F (37.1 °C)   TempSrc: Temporal   SpO2: 100%   Weight: 141 lb 6.4 oz (64.1 kg)   Height: 5' 3\" (1.6 m)       Physical Exam:  Physical Exam  Vitals and nursing note reviewed. Constitutional:       General: She is not in acute distress. Appearance: Normal appearance. She is well-developed and normal weight. She is not ill-appearing. HENT:      Head: Normocephalic and atraumatic. Right Ear: Hearing and external ear normal.      Left Ear: Hearing and external ear normal.      Nose:      Comments: Wearing mask  Eyes:      General: Lids are normal. No scleral icterus. Extraocular Movements: Extraocular movements intact. Conjunctiva/sclera: Conjunctivae normal.   Neck:      Thyroid: No thyromegaly. Cardiovascular:      Rate and Rhythm: Normal rate and regular rhythm. Heart sounds: Normal heart sounds. No murmur heard. Pulmonary:      Effort: Pulmonary effort is normal. No respiratory distress. Breath sounds: Normal breath sounds. No wheezing. Musculoskeletal:         General: No tenderness or deformity. Normal range of motion. Cervical back: Normal range of motion and neck supple. Right lower leg: No edema. Left lower leg: No edema. Lymphadenopathy:      Cervical: No cervical adenopathy. Skin:     General: Skin is warm and dry. Findings: Rash (macular rash on bilateral shins) present.    Neurological: General: No focal deficit present. Mental Status: She is alert and oriented to person, place, and time. Gait: Gait normal.   Psychiatric:         Mood and Affect: Affect normal. Mood is anxious. Speech: Speech normal.         Behavior: Behavior normal.         Thought Content:  Thought content normal.         Labs:  CBC with Differential:    Lab Results   Component Value Date    WBC 5.6 10/12/2021    RBC 5.15 10/12/2021    HGB 15.4 10/12/2021    HCT 47.0 10/12/2021     10/12/2021    MCV 91.3 10/12/2021    MCH 29.9 10/12/2021    MCHC 32.8 10/12/2021    RDW 12.1 10/12/2021    SEGSPCT 63 09/05/2012    LYMPHOPCT 42.1 10/12/2021    MONOPCT 7.7 10/12/2021    BASOPCT 0.7 10/12/2021    MONOSABS 0.43 10/12/2021    LYMPHSABS 2.35 10/12/2021    EOSABS 0.12 10/12/2021    BASOSABS 0.04 10/12/2021     CMP:    Lab Results   Component Value Date     10/12/2021    K 4.0 10/12/2021    K 3.6 02/17/2020     10/12/2021    CO2 25 10/12/2021    BUN 15 10/12/2021    CREATININE 0.9 10/12/2021    GFRAA >60 10/12/2021    LABGLOM >60 10/12/2021    GLUCOSE 85 10/12/2021    PROT 6.7 10/12/2021    LABALBU 4.0 10/12/2021    CALCIUM 9.8 10/12/2021    BILITOT 0.5 10/12/2021    ALKPHOS 50 10/12/2021    AST 20 10/12/2021    ALT 16 10/12/2021     U/A:    Lab Results   Component Value Date    COLORU Yellow 10/12/2021    PROTEINU Negative 10/12/2021    PHUR 6.0 10/12/2021    LABCAST MODERATE 02/22/2016    WBCUA 1-3 04/09/2021    RBCUA 0-1 04/09/2021    RBCUA 0-1 09/05/2012    BACTERIA MANY 04/09/2021    CLARITYU Clear 10/12/2021    SPECGRAV >=1.030 10/12/2021    LEUKOCYTESUR Negative 10/12/2021    UROBILINOGEN 0.2 10/12/2021    BILIRUBINUR Negative 10/12/2021    BLOODU Negative 10/12/2021    GLUCOSEU Negative 10/12/2021    AMORPHOUS FEW 02/22/2016     HgBA1c:    Lab Results   Component Value Date    LABA1C 5.1 10/12/2021     FLP:    Lab Results   Component Value Date    TRIG 71 10/12/2021    HDL 84 10/12/2021 [address] 10/12/2021    LABVLDL 14 10/12/2021     TSH:    Lab Results   Component Value Date    TSH 3.890 10/12/2021          Assessment and Plan:  Chelle Casanova was seen today for rash and hypertension. Diagnoses and all orders for this visit:    Primary hypertension  -     metoprolol succinate (TOPROL XL) 50 MG extended release tablet; Take 1 tablet by mouth daily  Much better control with stopping Sudafed and taking 50 mg of Metoprolol nightly. Medically optimized for [place] at this time. Acquired hypothyroidism  Stable    Rash and nonspecific skin eruption  -     clobetasol (TEMOVATE) 0.05 % ointment; Apply topically 2 times daily. Triamcinolone not as effective as it could be. WIll try higher potency. No follow-ups on file.       Seen By:  Tamie Hunt DO

## 2022-02-02 NOTE — PROGRESS NOTES
22  Praveen Florentino : 1970 Sex: female  Age: 54 y.o. Chief Complaint   Patient presents with    Lower Back Pain     fell 3w ago, backs been hurting, took uti test at [facility] it was negative.  Rash     3-4 days ago syptoms started, pain to the touch, irritated feels a little hot. HPI:  54 y.o. female presents today for 6 week follow up of chronic medical conditions and acute back pain. Patient's chart, medical, surgical and medication history all reviewed. Hypothyroidism  Patient presents for routine follow up of Hypothyroidism. Current symptoms: none. Patient denies change in energy level, diarrhea, heat / cold intolerance, nervousness, palpitations and weight changes. Symptoms have been well-controlled. No difficulty swallowing or masses felt. Last TSH was 3.890. Patient is currently taking Levothyroxine 100 mcg. She has history of CVID. Takes Biaxin daily. Follows with Dr. Kaylyn Rodriguez for management. Hypertension   The patient presents today for follow up of HTN. The problem is well controlled, improved. Risk factors for coronary artery disease include Age > 30 and HTN. Current treatments include Metoprolol. Lifestyle changes the patient has made include none. Today the patient is complaining of none. Back Pain  Patient also notes continued intermittent back pains. The pain started 1 year ago.  The patient did have a fall a few weeks back. The patient has been using Icy Hot, Lidocaine and heating pad at home with good relief of their symptoms.  She has been having continued pain since the fall. Using Aleve topical and doing stretches from PT.   She denies numbness, tingling or weakness to the extremities.  She denies any saddle anesthesia.  No bowel or bladder incontinence. No fever or chills. She was tested for UTI which was negative. Culture negative so medications stopped.     ROS:  Review of Systems   Constitutional: Negative for chills, fatigue and fever. Respiratory: Negative for cough, shortness of breath and wheezing. Cardiovascular: Negative for chest pain and palpitations. Gastrointestinal: Negative for abdominal pain, constipation, diarrhea, nausea and vomiting. Musculoskeletal: Negative for arthralgias and back pain. Skin: Negative for rash. Allergic/Immunologic: Positive for environmental allergies and immunocompromised state. Neurological: Negative for dizziness and headaches. Psychiatric/Behavioral: Negative for dysphoric mood. The patient is nervous/anxious. All other systems reviewed and are negative. Current Outpatient Medications on File Prior to Visit   Medication Sig Dispense Refill    clobetasol (TEMOVATE) 0.05 % ointment Apply topically 2 times daily. 60 g 1    metoprolol succinate (TOPROL XL) 50 MG extended release tablet Take 1 tablet by mouth daily 90 tablet 1    lidocaine (XYLOCAINE) 5 % ointment Apply topically as needed. 50 g 11    triamcinolone (ARISTOCORT) 0.5 % cream Apply topically 3 times daily. 15 g 1    RAMILA 1/20 1-20 MG-MCG per tablet take 1 tablet by mouth once daily as directed 21 tablet 11    FLUoxetine (PROZAC) 20 MG capsule take 1 capsule by mouth three times a day 270 capsule 1    triamcinolone (ARISTOCORT) 0.5 % cream Apply topically 3 times daily.  15 g 1    clarithromycin (BIAXIN) 250 MG tablet Take 250 mg by mouth daily      Turmeric Curcumin 500 MG CAPS Take by mouth      Multiple Vitamins-Minerals (ONE-A-DAY WOMENS PO) Take by mouth      SYMBICORT 160-4.5 MCG/ACT AERO inhale 1 to 2 puffs by mouth every 12 hours Rinse mouth after use      fluticasone (FLONASE) 50 MCG/ACT nasal spray instill 1 to 2 sprays into each nostril every morning      montelukast (SINGULAIR) 10 MG tablet take 1 tablet by mouth at bedtime      Probiotic Product (PROBIOTIC DAILY PO) Take by mouth LD 4/6/16      Omega-3 Fatty Acids (FISH OIL PO) Take by mouth LD 4/6/16      azelastine (ASTELIN) 137 MCG/SPRAY nasal spray 2 sprays by Nasal route nightly. Use in each nostril as directed       phenazopyridine (PYRIDIUM) 200 MG tablet take 1 tablet by mouth three times a day if needed for 3 days      ibuprofen (ADVIL;MOTRIN) 600 MG tablet TAKE 1 TABLET BY MOUTH THREE TIMES DAILY WITH FOOD AS NEEDED      levothyroxine (SYNTHROID) 100 MCG tablet take 1 tablet by mouth once daily 90 tablet 1     No current facility-administered medications on file prior to visit. Allergies   Allergen Reactions    Prednisone        Past Medical History:   Diagnosis Date    Anxiety     Common variable immunodeficiency ([facility] [address]     FU with Dr. Jayleen Phipps, no recent issues, stable     Environmental allergies     Kidney stone     Thyroid disease      Past Surgical History:   Procedure Laterality Date    COLONOSCOPY  04/11/2016    LITHOTRIPSY  2019    UPPER GASTROINTESTINAL ENDOSCOPY  04/11/2016    WISDOM TOOTH EXTRACTION       History reviewed. No pertinent family history. Social History     Socioeconomic History    Marital status: Single     Spouse name: Not on file    Number of children: Not on file    Years of education: Not on file    Highest education level: Not on file   Occupational History    Not on file   Tobacco Use    Smoking status: Never Smoker    Smokeless tobacco: Never Used   Substance and Sexual Activity    Alcohol use: No    Drug use: No    Sexual activity: Not on file   Other Topics Concern    Not on file   Social History Narrative    Not on file     Social Determinants of Health     Financial Resource Strain: Low Risk     Difficulty of Paying Living Expenses: Not hard at all   Food Insecurity: No Food Insecurity    Worried About [address] in the Last Year: Never true    [address] in the Last Year: Never true   Transportation Needs:     Lack of Transportation (Medical): Not on file    Lack of Transportation (Non-Medical):  Not on file   Physical Activity:     Days of Exercise per Week: Not on file    Minutes of Exercise per Session: Not on file   Stress:     Feeling of Stress : Not on file   Social Connections:     Frequency of Communication with Friends and Family: Not on file    Frequency of Social Gatherings with Friends and Family: Not on file    Attends Muslim Services: Not on file    Active Member of Clubs or Organizations: Not on file    Attends Club or Organization Meetings: Not on file    Marital Status: Not on file   Intimate Partner Violence:     Fear of Current or Ex-Partner: Not on file    Emotionally Abused: Not on file    Physically Abused: Not on file    Sexually Abused: Not on file   Housing Stability:     Unable to Pay for Housing in the Last Year: Not on file    Number of [place] in the Last Year: Not on file    Unstable Housing in the Last Year: Not on file       Vitals:    02/02/22 0902   BP: 118/82   Pulse: 72   Temp: 98.8 °F (37.1 °C)   TempSrc: Temporal   SpO2: 100%   Weight: 141 lb 6.4 oz (64.1 kg)   Height: 5' 3\" (1.6 m)       Physical Exam:  Physical Exam  Vitals and nursing note reviewed. Constitutional:       General: She is not in acute distress. Appearance: Normal appearance. She is well-developed and normal weight. She is not ill-appearing. HENT:      Head: Normocephalic and atraumatic. Right Ear: Hearing and external ear normal.      Left Ear: Hearing and external ear normal.      Nose:      Comments: Wearing mask  Eyes:      General: Lids are normal. No scleral icterus. Extraocular Movements: Extraocular movements intact. Conjunctiva/sclera: Conjunctivae normal.   Neck:      Thyroid: No thyromegaly. Cardiovascular:      Rate and Rhythm: Normal rate and regular rhythm. Heart sounds: Normal heart sounds. No murmur heard. Pulmonary:      Effort: Pulmonary effort is normal. No respiratory distress. Breath sounds: Normal breath sounds. No wheezing.    Musculoskeletal:         General: No tenderness or deformity. Normal range of motion. Cervical back: Normal range of motion and neck supple. Right lower leg: No edema. Left lower leg: No edema. Lymphadenopathy:      Cervical: No cervical adenopathy. Skin:     General: Skin is warm and dry. Findings: Rash (erythema medial R thigh) present. Neurological:      General: No focal deficit present. Mental Status: She is alert and oriented to person, place, and time. Gait: Gait normal.   Psychiatric:         Mood and Affect: Affect normal. Mood is anxious. Speech: Speech normal.         Behavior: Behavior normal.         Thought Content:  Thought content normal.         Labs:  CBC with Differential:    Lab Results   Component Value Date    WBC 5.6 10/12/2021    RBC 5.15 10/12/2021    HGB 15.4 10/12/2021    HCT 47.0 10/12/2021     10/12/2021    MCV 91.3 10/12/2021    MCH 29.9 10/12/2021    MCHC 32.8 10/12/2021    RDW 12.1 10/12/2021    SEGSPCT 63 09/05/2012    LYMPHOPCT 42.1 10/12/2021    MONOPCT 7.7 10/12/2021    BASOPCT 0.7 10/12/2021    MONOSABS 0.43 10/12/2021    LYMPHSABS 2.35 10/12/2021    EOSABS 0.12 10/12/2021    BASOSABS 0.04 10/12/2021     CMP:    Lab Results   Component Value Date     10/12/2021    K 4.0 10/12/2021    K 3.6 02/17/2020     10/12/2021    CO2 25 10/12/2021    BUN 15 10/12/2021    CREATININE 0.9 10/12/2021    GFRAA >60 10/12/2021    LABGLOM >60 10/12/2021    GLUCOSE 85 10/12/2021    PROT 6.7 10/12/2021    LABALBU 4.0 10/12/2021    CALCIUM 9.8 10/12/2021    BILITOT 0.5 10/12/2021    ALKPHOS 50 10/12/2021    AST 20 10/12/2021    ALT 16 10/12/2021     U/A:    Lab Results   Component Value Date    COLORU Yellow 10/12/2021    PROTEINU Negative 10/12/2021    PHUR 6.0 10/12/2021    LABCAST MODERATE 02/22/2016    WBCUA 1-3 04/09/2021    RBCUA 0-1 04/09/2021    RBCUA 0-1 09/05/2012    BACTERIA MANY 04/09/2021    CLARITYU Clear 10/12/2021    SPECGRAV >=1.030 10/12/2021    LEUKOCYTESUR Negative 10/12/2021    UROBILINOGEN 0.2 10/12/2021    BILIRUBINUR Negative 10/12/2021    BLOODU Negative 10/12/2021    GLUCOSEU Negative 10/12/2021    AMORPHOUS FEW 02/22/2016     HgBA1c:    Lab Results   Component Value Date    LABA1C 5.1 10/12/2021     FLP:    Lab Results   Component Value Date    TRIG 71 10/12/2021    HDL 84 10/12/2021    LDLCALC 76 10/12/2021    LABVLDL 14 10/12/2021     TSH:    Lab Results   Component Value Date    TSH 3.890 10/12/2021          Assessment and Plan:  Rita Roe was seen today for lower back pain and rash. Diagnoses and all orders for this visit:    Primary hypertension  Well controlled on current regimen. Labs in October all normal.      Acquired hypothyroidism  Well controlled    DESIRAE (generalized anxiety disorder)  Stable at current time. Acute bilateral low back pain without sciatica  -     cyclobenzaprine (FLEXERIL) 10 MG tablet; Take 1 tablet by mouth nightly as needed for Muscle spasms  Discussed steroids and muscle relaxers. Patient would prefer no steroids at this time. Rash and nonspecific skin eruption  Faint erythema to medial thigh. Will continue topical steroid        Return in about 6 months (around 8/2/2022), or if symptoms worsen or fail to improve, for Well visit.       Seen By:  Selvin Hallman DO

## 2022-02-02 NOTE — TELEPHONE ENCOUNTER
No, I told her that a steroid CREAM would not help her back pain.   I explained that she was to use the muscle relaxer and continue the Aleve cream and exercises she was already doing

## 2022-02-03 DIAGNOSIS — L20.84 INTRINSIC ECZEMA: ICD-10-CM

## 2022-02-03 RX ORDER — TRIAMCINOLONE ACETONIDE 5 MG/G
CREAM TOPICAL
Qty: 15 G | Refills: 1 | Status: SHIPPED | OUTPATIENT
Start: 2022-02-03

## 2022-02-03 NOTE — TELEPHONE ENCOUNTER
----- Message from April Nagle sent at 2/2/2022  2:03 PM EST -----  Subject: Refill Request    QUESTIONS  Name of Medication? triamcinolone (ARISTOCORT) 0.5 % cream  Patient-reported dosage and instructions? Apply topically 3 times daily. How many days do you have left? 0  Preferred Pharmacy? [address] [facility] phone number (if available)? [phone]  ---------------------------------------------------------------------------  --------------,  Name of Medication? lidocaine (XYLOCAINE) 5 % ointment  Patient-reported dosage and instructions? Apply topically as needed  How many days do you have left? 0  Preferred Pharmacy? [address] [facility] phone number (if available)? [phone]  ---------------------------------------------------------------------------  --------------  CALL BACK INFO  What is the best way for the office to contact you? OK to leave message on   voicemail  Preferred Call Back Phone Number?  [phone]

## 2022-02-08 RX ORDER — CYCLOBENZAPRINE HCL 10 MG
10 TABLET ORAL NIGHTLY PRN
Qty: 30 TABLET | Refills: 5 | Status: SHIPPED | OUTPATIENT
Start: 2022-02-08 | End: 2022-02-18

## 2022-02-08 NOTE — TELEPHONE ENCOUNTER
She said that the flexeril is helping and she will need a refill on this for next month if she is going to continue to use it.  She states that her cream was filled - the cream that she has been using is clobetasol

## 2022-04-14 ENCOUNTER — TELEPHONE (OUTPATIENT)
Dept: PRIMARY CARE CLINIC | Age: 52
End: 2022-04-14

## 2022-04-14 DIAGNOSIS — F41.1 GAD (GENERALIZED ANXIETY DISORDER): ICD-10-CM

## 2022-04-14 RX ORDER — FLUOXETINE HYDROCHLORIDE 20 MG/1
CAPSULE ORAL
Qty: 270 CAPSULE | Refills: 3 | Status: SHIPPED | OUTPATIENT
Start: 2022-04-14

## 2022-04-14 RX ORDER — LISINOPRIL 10 MG/1
10 TABLET ORAL DAILY
Qty: 30 TABLET | Refills: 5 | Status: SHIPPED
Start: 2022-04-14 | End: 2022-05-18 | Stop reason: SDUPTHER

## 2022-04-14 NOTE — TELEPHONE ENCOUNTER
Those symptoms are not typical side effects of a beta blocker, but I can try something different if she would really like

## 2022-04-14 NOTE — TELEPHONE ENCOUNTER
----- Message from Radha Soriano sent at 4/14/2022  8:25 AM EDT -----  Subject: Medication Problem    QUESTIONS  Name of Medication? metoprolol succinate (TOPROL XL) 50 MG extended   release tablet  Patient-reported dosage and instructions? 1 tab at night   What question or problem do you have with the medication? Patient has been   taking this for awhile, and has been having some unpleasant side effects. Weight gain, restlessness, skin sensitivity, and runny nose. She would   like to know if she can try something else for her blood pressure. Preferred Pharmacy? [id number] [address] phone number (if available)? [phone]  Additional Information for Provider? She would like to know if she should   stop taking this now.   ---------------------------------------------------------------------------  --------------  CALL BACK INFO  What is the best way for the office to contact you? OK to leave message on   voicemail  Preferred Call Back Phone Number? [phone]  ---------------------------------------------------------------------------  --------------  SCRIPT ANSWERS  Relationship to Patient?  Self

## 2022-04-19 ENCOUNTER — TELEPHONE (OUTPATIENT)
Dept: FAMILY MEDICINE CLINIC | Age: 52
End: 2022-04-19

## 2022-04-19 DIAGNOSIS — I10 PRIMARY HYPERTENSION: Primary | ICD-10-CM

## 2022-04-19 DIAGNOSIS — R73.01 IMPAIRED FASTING GLUCOSE: ICD-10-CM

## 2022-04-19 DIAGNOSIS — E03.9 ACQUIRED HYPOTHYROIDISM: ICD-10-CM

## 2022-04-19 DIAGNOSIS — E55.9 VITAMIN D INSUFFICIENCY: ICD-10-CM

## 2022-04-19 DIAGNOSIS — I10 PRIMARY HYPERTENSION: ICD-10-CM

## 2022-04-19 LAB
ALBUMIN SERPL-MCNC: 4 G/DL (ref 3.5–5.2)
ALP BLD-CCNC: 52 U/L (ref 35–104)
ALT SERPL-CCNC: 17 U/L (ref 0–32)
ANION GAP SERPL CALCULATED.3IONS-SCNC: 11 MMOL/L (ref 7–16)
AST SERPL-CCNC: 22 U/L (ref 0–31)
BASOPHILS ABSOLUTE: 0.04 E9/L (ref 0–0.2)
BASOPHILS RELATIVE PERCENT: 0.4 % (ref 0–2)
BILIRUB SERPL-MCNC: 0.2 MG/DL (ref 0–1.2)
BILIRUBIN URINE: NEGATIVE
BLOOD, URINE: NEGATIVE
BUN BLDV-MCNC: 16 MG/DL (ref 6–20)
CALCIUM SERPL-MCNC: 9.2 MG/DL (ref 8.6–10.2)
CHLORIDE BLD-SCNC: 101 MMOL/L (ref 98–107)
CHOLESTEROL, TOTAL: 155 MG/DL (ref 0–199)
CLARITY: CLEAR
CO2: 26 MMOL/L (ref 22–29)
COLOR: YELLOW
CREAT SERPL-MCNC: 0.7 MG/DL (ref 0.5–1)
EOSINOPHILS ABSOLUTE: 0.08 E9/L (ref 0.05–0.5)
EOSINOPHILS RELATIVE PERCENT: 0.7 % (ref 0–6)
GFR AFRICAN AMERICAN: >60
GFR NON-AFRICAN AMERICAN: >60 ML/MIN/1.73
GLUCOSE BLD-MCNC: 126 MG/DL (ref 74–99)
GLUCOSE URINE: NEGATIVE MG/DL
HBA1C MFR BLD: 5.3 % (ref 4–5.6)
HCT VFR BLD CALC: 45.2 % (ref 34–48)
HDLC SERPL-MCNC: 78 MG/DL
HEMOGLOBIN: 14.4 G/DL (ref 11.5–15.5)
IMMATURE GRANULOCYTES #: 0.05 E9/L
IMMATURE GRANULOCYTES %: 0.5 % (ref 0–5)
KETONES, URINE: NEGATIVE MG/DL
LDL CHOLESTEROL CALCULATED: 58 MG/DL (ref 0–99)
LEUKOCYTE ESTERASE, URINE: NEGATIVE
LYMPHOCYTES ABSOLUTE: 2.07 E9/L (ref 1.5–4)
LYMPHOCYTES RELATIVE PERCENT: 18.6 % (ref 20–42)
MCH RBC QN AUTO: 30 PG (ref 26–35)
MCHC RBC AUTO-ENTMCNC: 31.9 % (ref 32–34.5)
MCV RBC AUTO: 94.2 FL (ref 80–99.9)
MONOCYTES ABSOLUTE: 0.38 E9/L (ref 0.1–0.95)
MONOCYTES RELATIVE PERCENT: 3.4 % (ref 2–12)
NEUTROPHILS ABSOLUTE: 8.48 E9/L (ref 1.8–7.3)
NEUTROPHILS RELATIVE PERCENT: 76.4 % (ref 43–80)
NITRITE, URINE: NEGATIVE
PDW BLD-RTO: 11.9 FL (ref 11.5–15)
PH UA: 6 (ref 5–9)
PLATELET # BLD: 246 E9/L (ref 130–450)
PMV BLD AUTO: 9.9 FL (ref 7–12)
POTASSIUM SERPL-SCNC: 3.9 MMOL/L (ref 3.5–5)
PROTEIN UA: NEGATIVE MG/DL
RBC # BLD: 4.8 E12/L (ref 3.5–5.5)
SODIUM BLD-SCNC: 138 MMOL/L (ref 132–146)
SPECIFIC GRAVITY UA: 1.02 (ref 1–1.03)
T4 FREE: 1.18 NG/DL (ref 0.93–1.7)
TOTAL PROTEIN: 6.4 G/DL (ref 6.4–8.3)
TRIGL SERPL-MCNC: 94 MG/DL (ref 0–149)
TSH SERPL DL<=0.05 MIU/L-ACNC: 1.96 UIU/ML (ref 0.27–4.2)
UROBILINOGEN, URINE: 0.2 E.U./DL
VLDLC SERPL CALC-MCNC: 19 MG/DL
WBC # BLD: 11.1 E9/L (ref 4.5–11.5)

## 2022-04-19 NOTE — TELEPHONE ENCOUNTER
----- Message from Gautam Conroy sent at 4/19/2022 11:33 AM EDT -----  Subject: Results Request    QUESTIONS  Which lab or imaging result is the patient calling about? Lab  Which provider ordered the test?   At what location was the test performed? Date the test was performed? Additional Information for Provider? Patient is calling in and would like   to have her thyroid checked out. She does have an appointment in August,   but she just wants it checked out. Thank you.  ---------------------------------------------------------------------------  --------------  CALL BACK INFO  What is the best way for the office to contact you? OK to leave message on   voicemail  Preferred Call Back Phone Number? [phone]  ---------------------------------------------------------------------------  --------------  SCRIPT ANSWERS  Relationship to Patient?  Self

## 2022-04-20 LAB — VITAMIN D 25-HYDROXY: 102 NG/ML (ref 30–100)

## 2022-05-02 ENCOUNTER — TELEPHONE (OUTPATIENT)
Dept: PRIMARY CARE CLINIC | Age: 52
End: 2022-05-02

## 2022-05-02 DIAGNOSIS — R19.4 CHANGE IN BOWEL HABITS: ICD-10-CM

## 2022-05-02 DIAGNOSIS — K92.1 HEMATOCHEZIA: Primary | ICD-10-CM

## 2022-05-02 NOTE — TELEPHONE ENCOUNTER
----- Message from [address] sent at 5/2/2022 10:24 AM EDT -----  Subject: Referral Request    QUESTIONS   Reason for referral request? Gastro doctor is needed. She is requesting to   be seen by Vera Ac   Has the physician seen you for this condition before? No   Preferred Specialist (if applicable)? Do you already have an appointment scheduled? No  Additional Information for Provider?   ---------------------------------------------------------------------------  --------------  CALL BACK INFO  What is the best way for the office to contact you? OK to leave message on   voicemail  Preferred Call Back Phone Number? [phone]  ---------------------------------------------------------------------------  --------------  SCRIPT ANSWERS  Relationship to Patient?  Self

## 2022-05-12 DIAGNOSIS — E03.9 ACQUIRED HYPOTHYROIDISM: ICD-10-CM

## 2022-05-12 RX ORDER — LEVOTHYROXINE SODIUM 0.1 MG/1
TABLET ORAL
Qty: 90 TABLET | Refills: 3 | Status: SHIPPED | OUTPATIENT
Start: 2022-05-12

## 2022-05-18 RX ORDER — LISINOPRIL 10 MG/1
10 TABLET ORAL DAILY
Qty: 90 TABLET | Refills: 3 | Status: SHIPPED | OUTPATIENT
Start: 2022-05-18

## 2022-06-11 DIAGNOSIS — I10 PRIMARY HYPERTENSION: ICD-10-CM

## 2022-06-11 RX ORDER — METOPROLOL SUCCINATE 50 MG/1
TABLET, EXTENDED RELEASE ORAL
Qty: 90 TABLET | Refills: 1 | OUTPATIENT
Start: 2022-06-11

## 2022-07-01 ENCOUNTER — TELEPHONE (OUTPATIENT)
Dept: PRIMARY CARE CLINIC | Age: 52
End: 2022-07-01

## 2022-07-01 NOTE — TELEPHONE ENCOUNTER
----- Message from Mode Minor sent at 7/1/2022  2:19 PM EDT -----  Subject: Appointment Request    Reason for Call: Established Patient Appointment needed: Urgent Back Neck   Pain    QUESTIONS    Reason for appointment request? Requested Provider unavailable - Anabel Thomas     Additional Information for Provider? Pt has had low back pain since Monday   the 6/27/22.  please call patient to get an appointment schedule.   ---------------------------------------------------------------------------  --------------  Ky Fraction INFO  [phone]; OK to leave message on voicemail  ---------------------------------------------------------------------------  --------------  SCRIPT ANSWERS  COVID Screen: Dandre Antonio

## 2022-07-01 NOTE — TELEPHONE ENCOUNTER
Left voice message that there are no available appointments today. Advised to use express care on Saturday or Sunday. If she wanted an appointment next week, call the office back.

## 2022-07-05 RX ORDER — MONTELUKAST SODIUM 10 MG/1
TABLET ORAL
Qty: 30 TABLET | Refills: 11 | Status: SHIPPED
Start: 2022-07-05 | End: 2022-09-19 | Stop reason: SDUPTHER

## 2022-07-05 NOTE — TELEPHONE ENCOUNTER
----- Message from Carly Ruba sent at 7/5/2022 10:26 AM EDT -----  Subject: Message to Provider    QUESTIONS  Information for Provider? patient requesting refill cetirizine 10 mg   tablets 1 a day at night have 1 or 2 days left [id number]   [place] [facility] - Farhat Gutierrez   [phone] - F [phone]  ---------------------------------------------------------------------------  --------------  Bethany Shelton INFO  [phone]; OK to leave message on voicemail, OK to respond with   electronic message via [employer] portal (only for patients who have   registered [employer] account)  ---------------------------------------------------------------------------  --------------  SCRIPT ANSWERS  Relationship to Patient?  Self

## 2022-07-05 NOTE — TELEPHONE ENCOUNTER
Left msg to return call.  Believe [facility] put wrong medication, need to double check with patient if this is indeed for generic Zyrtec or if she was needing generic flexeril

## 2022-07-07 ENCOUNTER — OFFICE VISIT (OUTPATIENT)
Dept: PRIMARY CARE CLINIC | Age: 52
End: 2022-07-07
Payer: COMMERCIAL

## 2022-07-07 VITALS
OXYGEN SATURATION: 97 % | DIASTOLIC BLOOD PRESSURE: 90 MMHG | HEART RATE: 106 BPM | TEMPERATURE: 96.9 F | HEIGHT: 64 IN | SYSTOLIC BLOOD PRESSURE: 122 MMHG | WEIGHT: 143.2 LBS | BODY MASS INDEX: 24.45 KG/M2

## 2022-07-07 DIAGNOSIS — G89.29 CHRONIC LEFT-SIDED LOW BACK PAIN WITHOUT SCIATICA: Primary | ICD-10-CM

## 2022-07-07 DIAGNOSIS — M54.50 CHRONIC LEFT-SIDED LOW BACK PAIN WITHOUT SCIATICA: Primary | ICD-10-CM

## 2022-07-07 PROCEDURE — 99213 OFFICE O/P EST LOW 20 MIN: CPT | Performed by: FAMILY MEDICINE

## 2022-07-07 PROCEDURE — 1036F TOBACCO NON-USER: CPT | Performed by: FAMILY MEDICINE

## 2022-07-07 PROCEDURE — G8420 CALC BMI NORM PARAMETERS: HCPCS | Performed by: FAMILY MEDICINE

## 2022-07-07 PROCEDURE — G8427 DOCREV CUR MEDS BY ELIG CLIN: HCPCS | Performed by: FAMILY MEDICINE

## 2022-07-07 RX ORDER — CYCLOBENZAPRINE HCL 10 MG
10 TABLET ORAL NIGHTLY PRN
Qty: 30 TABLET | Refills: 5 | Status: SHIPPED | OUTPATIENT
Start: 2022-07-07 | End: 2022-07-17

## 2022-07-07 ASSESSMENT — PATIENT HEALTH QUESTIONNAIRE - PHQ9
SUM OF ALL RESPONSES TO PHQ9 QUESTIONS 1 & 2: 0
SUM OF ALL RESPONSES TO PHQ QUESTIONS 1-9: 0
2. FEELING DOWN, DEPRESSED OR HOPELESS: 0
SUM OF ALL RESPONSES TO PHQ QUESTIONS 1-9: 0
1. LITTLE INTEREST OR PLEASURE IN DOING THINGS: 0
SUM OF ALL RESPONSES TO PHQ QUESTIONS 1-9: 0
SUM OF ALL RESPONSES TO PHQ QUESTIONS 1-9: 0

## 2022-07-07 ASSESSMENT — ENCOUNTER SYMPTOMS
ABDOMINAL PAIN: 0
DIARRHEA: 0
CONSTIPATION: 0
NAUSEA: 0
COUGH: 0
SHORTNESS OF BREATH: 0
WHEEZING: 0
VOMITING: 0
BACK PAIN: 1

## 2022-07-07 NOTE — PROGRESS NOTES
22  Andrea Torres : 1970 Sex: female  Age: 54 y.o. Chief Complaint   Patient presents with    Lower Back Pain     pt thinks she has a pinched nerve. she saw urology on friday and tested negatiave for UTI     HPI:  54 y.o. female presents today for acute visit due to back pain. Patient's chart, medical, surgical and medication history all reviewed. Back Pain  Patient also notes continued intermittent back pains. The pain started 2 years ago. No significant trauma noted. The patient has been using [place], Lidocaine and heating pad at home with some relief of their symptoms.  Using Aleve topical and doing stretches from PT.   She denies numbness, tingling or weakness to the extremities.  She denies any saddle anesthesia.  No bowel or bladder incontinence. No fever or chills. She was tested for UTI which was negative. ROS:  Review of Systems   Constitutional: Negative for chills, fatigue and fever. Respiratory: Negative for cough, shortness of breath and wheezing. Cardiovascular: Negative for chest pain and palpitations. Gastrointestinal: Negative for abdominal pain, constipation, diarrhea, nausea and vomiting. Musculoskeletal: Positive for back pain. Negative for arthralgias. Skin: Negative for rash. Allergic/Immunologic: Positive for environmental allergies and immunocompromised state. Neurological: Negative for dizziness and headaches. Psychiatric/Behavioral: Negative for dysphoric mood. The patient is nervous/anxious. All other systems reviewed and are negative.        Current Outpatient Medications on File Prior to Visit   Medication Sig Dispense Refill    montelukast (SINGULAIR) 10 MG tablet take 1 tablet by mouth at bedtime 30 tablet 11    lisinopril (PRINIVIL;ZESTRIL) 10 MG tablet Take 1 tablet by mouth daily 90 tablet 3    levothyroxine (SYNTHROID) 100 MCG tablet TAKE 1 TABLET DAILY 90 tablet 3    FLUoxetine (PROZAC) 20 MG capsule TAKE 1 CAPSULE THREE TIMES A  capsule 3    triamcinolone (ARISTOCORT) 0.5 % cream Apply topically 3 times daily. 15 g 1    phenazopyridine (PYRIDIUM) 200 MG tablet take 1 tablet by mouth three times a day if needed for 3 days      ibuprofen (ADVIL;MOTRIN) 600 MG tablet TAKE 1 TABLET BY MOUTH THREE TIMES DAILY WITH FOOD AS NEEDED      clobetasol (TEMOVATE) 0.05 % ointment Apply topically 2 times daily. 60 g 1    metoprolol succinate (TOPROL XL) 50 MG extended release tablet Take 1 tablet by mouth daily 90 tablet 1    lidocaine (XYLOCAINE) 5 % ointment Apply topically as needed. 50 g 11    triamcinolone (ARISTOCORT) 0.5 % cream Apply topically 3 times daily. 15 g 1    RAMILA 1/20 1-20 MG-MCG per tablet take 1 tablet by mouth once daily as directed 21 tablet 11    clarithromycin (BIAXIN) 250 MG tablet Take 250 mg by mouth daily      Turmeric Curcumin 500 MG CAPS Take by mouth      Multiple Vitamins-Minerals (ONE-A-DAY WOMENS PO) Take by mouth      SYMBICORT 160-4.5 MCG/ACT AERO inhale 1 to 2 puffs by mouth every 12 hours Rinse mouth after use      fluticasone (FLONASE) 50 MCG/ACT nasal spray instill 1 to 2 sprays into each nostril every morning      Probiotic Product (PROBIOTIC DAILY PO) Take by mouth LD 4/6/16      Omega-3 Fatty Acids (FISH OIL PO) Take by mouth LD 4/6/16      azelastine (ASTELIN) 137 MCG/SPRAY nasal spray 2 sprays by Nasal route nightly. Use in each nostril as directed        No current facility-administered medications on file prior to visit.        Allergies   Allergen Reactions    Prednisone        Past Medical History:   Diagnosis Date    Anxiety     Common variable immunodeficiency ([facility] [address]     FU with Dr. Juliana Munoz, no recent issues, stable     Environmental allergies     Kidney stone     Thyroid disease      Past Surgical History:   Procedure Laterality Date    COLONOSCOPY  04/11/2016    LITHOTRIPSY  2019    UPPER GASTROINTESTINAL ENDOSCOPY  04/11/2016    WISDOM TOOTH EXTRACTION History reviewed. No pertinent family history. Social History     Socioeconomic History    Marital status: Single     Spouse name: Not on file    Number of children: Not on file    Years of education: Not on file    Highest education level: Not on file   Occupational History    Not on file   Tobacco Use    Smoking status: Never Smoker    Smokeless tobacco: Never Used   Substance and Sexual Activity    Alcohol use: No    Drug use: No    Sexual activity: Not on file   Other Topics Concern    Not on file   Social History Narrative    Not on file     Social Determinants of Health     Financial Resource Strain: Low Risk     Difficulty of Paying Living Expenses: Not hard at all   Food Insecurity: No Food Insecurity    Worried About [address] in the Last Year: Never true    [address] [employer] in the Last Year: Never true   Transportation Needs:     Lack of Transportation (Medical): Not on file    Lack of Transportation (Non-Medical):  Not on file   Physical Activity:     Days of Exercise per Week: Not on file    Minutes of Exercise per Session: Not on file   Stress:     Feeling of Stress : Not on file   Social Connections:     Frequency of Communication with Friends and Family: Not on file    Frequency of Social Gatherings with Friends and Family: Not on file    Attends Buddhism Services: Not on file    Active Member of [address] [place] [address] or Organizations: Not on file    Attends Club or Organization Meetings: Not on file    Marital Status: Not on file   Intimate Partner Violence:     Fear of Current or Ex-Partner: Not on file    Emotionally Abused: Not on file    Physically Abused: Not on file    Sexually Abused: Not on file   Housing Stability:     Unable to Pay for Housing in the Last Year: Not on file    Number of [place] in the Last Year: Not on file    Unstable Housing in the Last Year: Not on file       Vitals:    07/07/22 1354   BP: (!) 122/90   Pulse: (!) 106   Temp: 96.9 °F RBC 4.80 04/19/2022 04:23 PM    HGB 14.4 04/19/2022 04:23 PM    HCT 45.2 04/19/2022 04:23 PM     04/19/2022 04:23 PM    MCV 94.2 04/19/2022 04:23 PM    MCH 30.0 04/19/2022 04:23 PM    MCHC 31.9 04/19/2022 04:23 PM    RDW 11.9 04/19/2022 04:23 PM    SEGSPCT 63 09/05/2012 09:20 PM    LYMPHOPCT 18.6 04/19/2022 04:23 PM    MONOPCT 3.4 04/19/2022 04:23 PM    BASOPCT 0.4 04/19/2022 04:23 PM    MONOSABS 0.38 04/19/2022 04:23 PM    LYMPHSABS 2.07 04/19/2022 04:23 PM    EOSABS 0.08 04/19/2022 04:23 PM    BASOSABS 0.04 04/19/2022 04:23 PM     CMP:    Lab Results   Component Value Date/Time     04/19/2022 04:23 PM    K 3.9 04/19/2022 04:23 PM    K 3.6 02/17/2020 06:43 PM     04/19/2022 04:23 PM    CO2 26 04/19/2022 04:23 PM    BUN 16 04/19/2022 04:23 PM    CREATININE 0.7 04/19/2022 04:23 PM    GFRAA >60 04/19/2022 04:23 PM    LABGLOM >60 04/19/2022 04:23 PM    GLUCOSE 126 04/19/2022 04:23 PM    PROT 6.4 04/19/2022 04:23 PM    LABALBU 4.0 04/19/2022 04:23 PM    CALCIUM 9.2 04/19/2022 04:23 PM    BILITOT 0.2 04/19/2022 04:23 PM    ALKPHOS 52 04/19/2022 04:23 PM    AST 22 04/19/2022 04:23 PM    ALT 17 04/19/2022 04:23 PM     U/A:    Lab Results   Component Value Date/Time    COLORU Yellow 04/19/2022 04:22 PM    PROTEINU Negative 04/19/2022 04:22 PM    PHUR 6.0 04/19/2022 04:22 PM    LABCAST MODERATE 02/22/2016 02:50 AM    WBCUA 1-3 04/09/2021 10:56 AM    RBCUA 0-1 04/09/2021 10:56 AM    RBCUA 0-1 09/05/2012 09:20 PM    BACTERIA MANY 04/09/2021 10:56 AM    CLARITYU Clear 04/19/2022 04:22 PM    SPECGRAV 1.025 04/19/2022 04:22 PM    LEUKOCYTESUR Negative 04/19/2022 04:22 PM    UROBILINOGEN 0.2 04/19/2022 04:22 PM    BILIRUBINUR Negative 04/19/2022 04:22 PM    BLOODU Negative 04/19/2022 04:22 PM    GLUCOSEU Negative 04/19/2022 04:22 PM    AMORPHOUS FEW 02/22/2016 02:50 AM     HgBA1c:    Lab Results   Component Value Date/Time    LABA1C 5.3 04/19/2022 04:23 PM     FLP:    Lab Results   Component Value Date/Time TRIG 94 04/19/2022 04:23 PM    HDL 78 04/19/2022 04:23 PM    LDLCALC 58 04/19/2022 04:23 PM    LABVLDL 19 04/19/2022 04:23 PM     TSH:    Lab Results   Component Value Date/Time    TSH 1.960 04/19/2022 04:23 PM          Assessment and Plan:  Kendal Saeed was seen today for lower back pain. Diagnoses and all orders for this visit:    Chronic left-sided low back pain without sciatica  -     Carol Valadez MD, Sports Medicine, [facility]  -     cyclobenzaprine (FLEXERIL) 10 MG tablet; Take 1 tablet by mouth nightly as needed for Muscle spasms    Discussed continued options for management. Suspect more muscle weakness. Offered OP PT vs MRI vs Sports Med consult. Will send to Dr. Kandis Guy for evaluation and opinion on advanced imaging. Return if symptoms worsen or fail to improve.       Seen By:  Alexis Uribe DO

## 2022-07-22 ENCOUNTER — OFFICE VISIT (OUTPATIENT)
Dept: SPORTS MEDICINE | Age: 52
End: 2022-07-22
Payer: COMMERCIAL

## 2022-07-22 VITALS — BODY MASS INDEX: 24.41 KG/M2 | WEIGHT: 143 LBS | HEIGHT: 64 IN

## 2022-07-22 DIAGNOSIS — M70.62 GREATER TROCHANTERIC BURSITIS OF LEFT HIP: ICD-10-CM

## 2022-07-22 DIAGNOSIS — G89.29 CHRONIC LEFT SACROILIAC JOINT PAIN: Primary | ICD-10-CM

## 2022-07-22 DIAGNOSIS — M67.952 TENDINOPATHY OF LEFT GLUTEUS MEDIUS: ICD-10-CM

## 2022-07-22 DIAGNOSIS — M53.3 CHRONIC LEFT SACROILIAC JOINT PAIN: Primary | ICD-10-CM

## 2022-07-22 DIAGNOSIS — Z30.41 ENCOUNTER FOR SURVEILLANCE OF CONTRACEPTIVE PILLS: ICD-10-CM

## 2022-07-22 PROCEDURE — G8427 DOCREV CUR MEDS BY ELIG CLIN: HCPCS | Performed by: FAMILY MEDICINE

## 2022-07-22 PROCEDURE — 99203 OFFICE O/P NEW LOW 30 MIN: CPT | Performed by: FAMILY MEDICINE

## 2022-07-22 PROCEDURE — G8420 CALC BMI NORM PARAMETERS: HCPCS | Performed by: FAMILY MEDICINE

## 2022-07-22 PROCEDURE — 1036F TOBACCO NON-USER: CPT | Performed by: FAMILY MEDICINE

## 2022-07-22 RX ORDER — NORETHINDRONE ACETATE/ETHINYL ESTRADIOL 1MG-20MCG
KIT ORAL
Qty: 3 PACKET | Refills: 3 | Status: SHIPPED
Start: 2022-07-22 | End: 2022-09-19 | Stop reason: SDUPTHER

## 2022-07-22 NOTE — PROGRESS NOTES
[facility]  PRIMARY CARE SPORTS MEDICINE  DATE OF VISIT : 2022    Patient : Ian   Age : 54 y.o.  : 1970  MRN : [id number]   ______________________________________________________________________    Chief Complaint :   Chief Complaint   Patient presents with    Lower Back Pain     Patient has been doing PT and home exercise for the last 6 months. (L) sided SI joint pain. Patient has been using OTC creams with some relief but they have stopped working as well. HPI : Ian  is 54 y.o. female who presented to the clinic today for evaluation of left hip pain. Onset of symptoms was about 6 months ago with no reported mechanism of injury. Patient has tried physical therapy exercises and OTC topical treatments without significant improvement of her symptoms. Past Medical History :  Past Medical History:   Diagnosis Date    Anxiety     Common variable immunodeficiency ([address]     FU with Dr. Judge Briones, no recent issues, stable     Environmental allergies     Kidney stone     Thyroid disease      Past Surgical History:   Procedure Laterality Date    COLONOSCOPY  2016    LITHOTRIPSY  2019    UPPER GASTROINTESTINAL ENDOSCOPY  2016    WISDOM TOOTH EXTRACTION         Allergies : Allergies   Allergen Reactions    Prednisone        Medication List :    Current Outpatient Medications   Medication Sig Dispense Refill    montelukast (SINGULAIR) 10 MG tablet take 1 tablet by mouth at bedtime 30 tablet 11    lisinopril (PRINIVIL;ZESTRIL) 10 MG tablet Take 1 tablet by mouth daily 90 tablet 3    levothyroxine (SYNTHROID) 100 MCG tablet TAKE 1 TABLET DAILY 90 tablet 3    FLUoxetine (PROZAC) 20 MG capsule TAKE 1 CAPSULE THREE TIMES A  capsule 3    triamcinolone (ARISTOCORT) 0.5 % cream Apply topically 3 times daily.  15 g 1    phenazopyridine (PYRIDIUM) 200 MG tablet take 1 tablet by mouth three times a day if needed for 3 days      ibuprofen (ADVIL;MOTRIN) 600 MG tablet ______________________________________________________________________    Assessment & Plan :    1. Chronic left sacroiliac joint pain  2. Tendinopathy of left gluteus medius  3. Greater trochanteric bursitis of left hip  Patient presents to the office today for evaluation of left hip pain. History, referring provider note, physical exam and imaging (as interpreted by me) are consistent with contaminant SI joint pain and greater trochanteric bursitis. Treatment options discussed with patient in the office today including activity modification, oral anti-inflammatories, physical therapy, injection options, advanced imaging in the form of a MRI and referral to an orthopedic surgeon for discussion of surgical opinion. Patient wishes to proceed with conservative treatment in the form of topical diclofenac gel and continued home exercise plan. She will contact our office if she wishes to escalate treatment. Patient is agreeable with above plan all questions and concerns were addressed in the office today. - diclofenac sodium (VOLTAREN) 1 % GEL; Apply 4 g topically in the morning and 4 g at noon and 4 g in the evening and 4 g before bedtime. Dispense: 350 g; Refill: 1    Return to Office: Return if symptoms worsen or fail to improve.     Mark Ribera MD

## 2022-07-26 ENCOUNTER — TELEPHONE (OUTPATIENT)
Dept: SPORTS MEDICINE | Age: 52
End: 2022-07-26

## 2022-07-26 ENCOUNTER — TELEPHONE (OUTPATIENT)
Dept: PRIMARY CARE CLINIC | Age: 52
End: 2022-07-26

## 2022-07-26 NOTE — TELEPHONE ENCOUNTER
Pt stated she cannot use diclofenac sodium (VOLTAREN) 1 % GEL   Because one of the side effects is high blood pressure.  Pt stated she needs something that has very little side effects

## 2022-08-01 ENCOUNTER — OFFICE VISIT (OUTPATIENT)
Dept: SPORTS MEDICINE | Age: 52
End: 2022-08-01
Payer: COMMERCIAL

## 2022-08-01 VITALS — BODY MASS INDEX: 24.41 KG/M2 | WEIGHT: 143 LBS | HEIGHT: 64 IN

## 2022-08-01 DIAGNOSIS — M53.3 CHRONIC LEFT SACROILIAC JOINT PAIN: Primary | ICD-10-CM

## 2022-08-01 DIAGNOSIS — G89.29 CHRONIC LEFT SACROILIAC JOINT PAIN: Primary | ICD-10-CM

## 2022-08-01 PROCEDURE — 20611 DRAIN/INJ JOINT/BURSA W/US: CPT | Performed by: FAMILY MEDICINE

## 2022-08-01 PROCEDURE — 99999 PR OFFICE/OUTPT VISIT,PROCEDURE ONLY: CPT | Performed by: FAMILY MEDICINE

## 2022-08-01 RX ORDER — TRIAMCINOLONE ACETONIDE 40 MG/ML
40 INJECTION, SUSPENSION INTRA-ARTICULAR; INTRAMUSCULAR ONCE
Status: COMPLETED | OUTPATIENT
Start: 2022-08-01 | End: 2022-08-01

## 2022-08-01 RX ORDER — LIDOCAINE HYDROCHLORIDE 10 MG/ML
5 INJECTION, SOLUTION INFILTRATION; PERINEURAL ONCE
Status: COMPLETED | OUTPATIENT
Start: 2022-08-01 | End: 2022-08-01

## 2022-08-01 RX ADMIN — TRIAMCINOLONE ACETONIDE 40 MG: 40 INJECTION, SUSPENSION INTRA-ARTICULAR; INTRAMUSCULAR at 14:06

## 2022-08-01 RX ADMIN — LIDOCAINE HYDROCHLORIDE 5 ML: 10 INJECTION, SOLUTION INFILTRATION; PERINEURAL at 14:06

## 2022-08-01 NOTE — PROGRESS NOTES
local tissue breakdown, systemic effects of corticosteroids, elevation of blood glucose, injury to soft tissue and/or nerves, and seizure, the patient indicated their understanding and agreed to proceed. ? Discussed the risks, benefits, alternatives, and the necessity of other members of the healthcare team participating in the procedure. ?All questions answered and consent given. All questions and concerns were addressed and consent was verbalized by the patient. FOLLOW UP    Follow up in 6-8 weeks.     Electronically signed by Ashanti Dickson MD on 8/1/2022 at 1:55 PM

## 2022-08-29 DIAGNOSIS — M54.50 CHRONIC LOW BACK PAIN WITHOUT SCIATICA, UNSPECIFIED BACK PAIN LATERALITY: Primary | ICD-10-CM

## 2022-08-29 DIAGNOSIS — G89.29 CHRONIC LOW BACK PAIN WITHOUT SCIATICA, UNSPECIFIED BACK PAIN LATERALITY: Primary | ICD-10-CM

## 2022-08-30 ENCOUNTER — TELEPHONE (OUTPATIENT)
Dept: PRIMARY CARE CLINIC | Age: 52
End: 2022-08-30

## 2022-08-30 NOTE — TELEPHONE ENCOUNTER
----- Message from Dayna Cordova sent at 8/30/2022  9:50 AM EDT -----  Subject: Message to Provider    QUESTIONS  Information for Provider? patient is wanting her MRI from about a year ago   to be sent over to pain management. She thinks April or May of 2021. She   does not have their fax but she said you can call them to get it. [phone].   ---------------------------------------------------------------------------  --------------  Everton PONCE  [phone]; OK to leave message on voicemail  ---------------------------------------------------------------------------  --------------  SCRIPT ANSWERS  Relationship to Patient?  Self

## 2022-08-30 NOTE — TELEPHONE ENCOUNTER
Advised pt I spoke to pain management and they need no further imaging and that the imaging that she has had done is in epic - patient is scheduled 09/26 with pain management

## 2022-09-19 DIAGNOSIS — Z30.41 ENCOUNTER FOR SURVEILLANCE OF CONTRACEPTIVE PILLS: ICD-10-CM

## 2022-09-19 RX ORDER — MONTELUKAST SODIUM 10 MG/1
TABLET ORAL
Qty: 90 TABLET | Refills: 1 | Status: SHIPPED | OUTPATIENT
Start: 2022-09-19

## 2022-09-19 RX ORDER — NORETHINDRONE ACETATE AND ETHINYL ESTRADIOL 1; .02 MG/1; MG/1
TABLET ORAL
Qty: 3 PACKET | Refills: 3 | Status: SHIPPED | OUTPATIENT
Start: 2022-09-19

## 2022-09-19 NOTE — TELEPHONE ENCOUNTER
----- Message from Mandamelida Martinez sent at 9/19/2022  9:35 AM EDT -----  Subject: Refill Request    QUESTIONS  Name of Medication? RAMILA 1/20 1-20 MG-MCG per tablet  Patient-reported dosage and instructions? as directed   How many days do you have left? 0  Preferred Pharmacy? [facility] [facility] phone number (if available)? [phone]  ---------------------------------------------------------------------------  --------------,  Name of Medication? montelukast (SINGULAIR) 10 MG tablet  Patient-reported dosage and instructions? as directed   How many days do you have left? 0  Preferred Pharmacy? [address] phone number (if available)? [phone]  ---------------------------------------------------------------------------  --------------  CALL BACK INFO  What is the best way for the office to contact you? OK to leave message on   voicemail  Preferred Call Back Phone Number? [phone]  ---------------------------------------------------------------------------  --------------  SCRIPT ANSWERS  Relationship to Patient?  Self

## 2022-10-12 ENCOUNTER — TELEPHONE (OUTPATIENT)
Dept: PRIMARY CARE CLINIC | Age: 52
End: 2022-10-12

## 2022-10-12 DIAGNOSIS — E55.9 VITAMIN D INSUFFICIENCY: ICD-10-CM

## 2022-10-12 DIAGNOSIS — I10 PRIMARY HYPERTENSION: Primary | ICD-10-CM

## 2022-10-12 DIAGNOSIS — R73.01 IMPAIRED FASTING GLUCOSE: ICD-10-CM

## 2022-10-12 DIAGNOSIS — E03.9 ACQUIRED HYPOTHYROIDISM: ICD-10-CM

## 2022-10-12 NOTE — TELEPHONE ENCOUNTER
She is scheduled Nov. 15,  She asked if you can place the blood work order for her to complete before the 15th.

## 2022-10-12 NOTE — TELEPHONE ENCOUNTER
----- Message from Adam Liz sent at 10/12/2022  8:35 AM EDT -----  Subject: Appointment Request    Reason for Call: Established Patient Appointment needed: Routine Physical   Exam    QUESTIONS    Reason for appointment request? No appointments available during search     Additional Information for Provider? PT needs to reschedule her   appointment on 10.25.2022 to a later date if possible.  The appointment if   for a Annual physical, please reach out to the PT to discuss and   reschedule.   ---------------------------------------------------------------------------  --------------  [address]  [phone]; OK to leave message on voicemail  ---------------------------------------------------------------------------  --------------  SCRIPT ANSWERS  EDISON Screen: Sathya Pardo

## 2022-11-07 ENCOUNTER — TELEPHONE (OUTPATIENT)
Dept: PRIMARY CARE CLINIC | Age: 52
End: 2022-11-07

## 2022-11-07 NOTE — TELEPHONE ENCOUNTER
----- Message from Camron Beck sent at 11/7/2022  9:46 AM EST -----  Subject: Referral Request    Reason for referral request? Patient needs a blood work order month after   she had pneumonia shot on 11/15/2022 at 10:00 AM.   Provider patient wants to be referred to(if known):     Provider Phone Number(if known):     Additional Information for Provider?   ---------------------------------------------------------------------------  --------------  [address]    [phone]; OK to leave message on voicemail  ---------------------------------------------------------------------------  --------------

## 2022-11-07 NOTE — TELEPHONE ENCOUNTER
Patient has an appointment on 11/15 and is going to be getting pneumonia vaccine and would like labs 1 month later to see if vaccination is working.  I advised patient that there arent any labs that can do this and that we could discuss further at her appointment with the

## 2022-11-10 ENCOUNTER — OFFICE VISIT (OUTPATIENT)
Dept: ORTHOPEDIC SURGERY | Age: 52
End: 2022-11-10
Payer: COMMERCIAL

## 2022-11-10 VITALS — WEIGHT: 143 LBS | BODY MASS INDEX: 24.41 KG/M2 | HEIGHT: 64 IN

## 2022-11-10 DIAGNOSIS — M53.3 SACROILIAC JOINT PAIN: Primary | ICD-10-CM

## 2022-11-10 PROCEDURE — 20611 DRAIN/INJ JOINT/BURSA W/US: CPT | Performed by: FAMILY MEDICINE

## 2022-11-10 PROCEDURE — 99999 PR OFFICE/OUTPT VISIT,PROCEDURE ONLY: CPT | Performed by: FAMILY MEDICINE

## 2022-11-10 RX ORDER — LIDOCAINE HYDROCHLORIDE 10 MG/ML
5 INJECTION, SOLUTION INFILTRATION; PERINEURAL ONCE
Status: COMPLETED | OUTPATIENT
Start: 2022-11-10 | End: 2022-11-10

## 2022-11-10 RX ORDER — TRIAMCINOLONE ACETONIDE 40 MG/ML
40 INJECTION, SUSPENSION INTRA-ARTICULAR; INTRAMUSCULAR ONCE
Status: COMPLETED | OUTPATIENT
Start: 2022-11-10 | End: 2022-11-10

## 2022-11-10 RX ADMIN — TRIAMCINOLONE ACETONIDE 40 MG: 40 INJECTION, SUSPENSION INTRA-ARTICULAR; INTRAMUSCULAR at 15:07

## 2022-11-10 RX ADMIN — LIDOCAINE HYDROCHLORIDE 5 ML: 10 INJECTION, SOLUTION INFILTRATION; PERINEURAL at 15:07

## 2022-11-10 NOTE — PROGRESS NOTES
PROCEDURE NOTE:    DIAGNOSIS      LEFT sacroiliac joint pain    PROCEDURE     Ultrasound-guided LEFT sacroiliac joint corticosteroid injection. PROCEDURAL PAUSE     Procedural pause conducted to verify: ?correct patient identity, procedure to be performed, and as applicable, correct side and site, correct patient position, and availability of implants, special equipment, or special requirements. PROCEDURE DETAILS     The procedure was carried out under sterile technique. Patient Position: ?Prone. Localization Process: ? The sacroiliac joint was evaluated under ultrasound prior to starting the procedure. ? The skin was prepped with Betadine and Alcohol. Approach: ?Out-of-plane. Local Anesthesia: Under live ultrasound guidance, local anesthesia was obtained with vapocoolant cold spray and 2 cc of 1% lidocaine using a 25-gauge 2-inch needle advanced from an out-of-plane approach to the sacroiliac joint. ?     Injection/Aspiration: ?A 22-gauge 3-1/2-inch needle was advanced from an out-of-plane approach into the sacroiliac joint. ? After visualization of the needle tip in the target area and negative aspiration for blood, a mixture of 3 cc of 1% lidocaine and 1 cc of Kenalog (40 mg/cc) was injected into the sacroiliac joint with excellent sonographic flow. Images of procedure were permanently recorded. Postprocedure Care: ? The patient will avoid soaking the injection site under water for two days and avoid heavy exertion with the hip. ?The patient will contact me with any problems related to the injection. PATIENT EDUCATION     Ready to learn, no apparent learning barriers were identified; learning preferences include listening. ? Explained diagnosis and treatment plan; patient expressed understanding of the content.      INFORMED CONSENT     Following denial of allergy and review of potential side effects and complications including but not necessarily limited to infection, allergic reaction, local tissue breakdown, systemic effects of corticosteroids, elevation of blood glucose, injury to soft tissue and/or nerves, and seizure, the patient indicated their understanding and agreed to proceed. ? Discussed the risks, benefits, alternatives, and the necessity of other members of the healthcare team participating in the procedure. ?All questions answered and consent given. All questions and concerns were addressed and consent was verbalized by the patient. FOLLOW UP    Follow up in 6-8 weeks.     Electronically signed by Sury Landaverde MD on 11/10/2022 at 1:15 PM

## 2022-11-15 ENCOUNTER — OFFICE VISIT (OUTPATIENT)
Dept: PRIMARY CARE CLINIC | Age: 52
End: 2022-11-15
Payer: COMMERCIAL

## 2022-11-15 VITALS
BODY MASS INDEX: 24.92 KG/M2 | DIASTOLIC BLOOD PRESSURE: 84 MMHG | TEMPERATURE: 98.4 F | SYSTOLIC BLOOD PRESSURE: 132 MMHG | HEIGHT: 64 IN | HEART RATE: 96 BPM | OXYGEN SATURATION: 99 % | WEIGHT: 146 LBS

## 2022-11-15 DIAGNOSIS — E03.9 ACQUIRED HYPOTHYROIDISM: ICD-10-CM

## 2022-11-15 DIAGNOSIS — Z00.01 ENCOUNTER FOR WELL ADULT EXAM WITH ABNORMAL FINDINGS: Primary | ICD-10-CM

## 2022-11-15 DIAGNOSIS — I10 PRIMARY HYPERTENSION: ICD-10-CM

## 2022-11-15 DIAGNOSIS — F41.1 GAD (GENERALIZED ANXIETY DISORDER): ICD-10-CM

## 2022-11-15 DIAGNOSIS — R73.01 IMPAIRED FASTING GLUCOSE: ICD-10-CM

## 2022-11-15 DIAGNOSIS — Z23 NEED FOR PNEUMOCOCCAL VACCINATION: ICD-10-CM

## 2022-11-15 DIAGNOSIS — E55.9 VITAMIN D INSUFFICIENCY: ICD-10-CM

## 2022-11-15 DIAGNOSIS — Z12.11 SCREENING FOR COLON CANCER: ICD-10-CM

## 2022-11-15 LAB
ALBUMIN SERPL-MCNC: 4.2 G/DL (ref 3.5–5.2)
ALP BLD-CCNC: 50 U/L (ref 35–104)
ALT SERPL-CCNC: 16 U/L (ref 0–32)
ANION GAP SERPL CALCULATED.3IONS-SCNC: 14 MMOL/L (ref 7–16)
AST SERPL-CCNC: 19 U/L (ref 0–31)
BASOPHILS ABSOLUTE: 0.03 E9/L (ref 0–0.2)
BASOPHILS RELATIVE PERCENT: 0.3 % (ref 0–2)
BILIRUB SERPL-MCNC: 0.5 MG/DL (ref 0–1.2)
BILIRUBIN URINE: NEGATIVE
BLOOD, URINE: NEGATIVE
BUN BLDV-MCNC: 18 MG/DL (ref 6–20)
CALCIUM SERPL-MCNC: 9.9 MG/DL (ref 8.6–10.2)
CHLORIDE BLD-SCNC: 103 MMOL/L (ref 98–107)
CHOLESTEROL, TOTAL: 185 MG/DL (ref 0–199)
CLARITY: CLEAR
CO2: 23 MMOL/L (ref 22–29)
COLOR: YELLOW
CREAT SERPL-MCNC: 0.9 MG/DL (ref 0.5–1)
EOSINOPHILS ABSOLUTE: 0.04 E9/L (ref 0.05–0.5)
EOSINOPHILS RELATIVE PERCENT: 0.5 % (ref 0–6)
GFR SERPL CREATININE-BSD FRML MDRD: >60 ML/MIN/1.73
GLUCOSE BLD-MCNC: 92 MG/DL (ref 74–99)
GLUCOSE URINE: NEGATIVE MG/DL
HBA1C MFR BLD: 5.3 % (ref 4–5.6)
HCT VFR BLD CALC: 47.6 % (ref 34–48)
HDLC SERPL-MCNC: 93 MG/DL
HEMOGLOBIN: 15.5 G/DL (ref 11.5–15.5)
IMMATURE GRANULOCYTES #: 0.04 E9/L
IMMATURE GRANULOCYTES %: 0.5 % (ref 0–5)
KETONES, URINE: NEGATIVE MG/DL
LDL CHOLESTEROL CALCULATED: 75 MG/DL (ref 0–99)
LEUKOCYTE ESTERASE, URINE: NEGATIVE
LYMPHOCYTES ABSOLUTE: 2.58 E9/L (ref 1.5–4)
LYMPHOCYTES RELATIVE PERCENT: 29.8 % (ref 20–42)
MCH RBC QN AUTO: 30.6 PG (ref 26–35)
MCHC RBC AUTO-ENTMCNC: 32.6 % (ref 32–34.5)
MCV RBC AUTO: 94.1 FL (ref 80–99.9)
MONOCYTES ABSOLUTE: 0.54 E9/L (ref 0.1–0.95)
MONOCYTES RELATIVE PERCENT: 6.2 % (ref 2–12)
NEUTROPHILS ABSOLUTE: 5.44 E9/L (ref 1.8–7.3)
NEUTROPHILS RELATIVE PERCENT: 62.7 % (ref 43–80)
NITRITE, URINE: NEGATIVE
PDW BLD-RTO: 12.1 FL (ref 11.5–15)
PH UA: 6 (ref 5–9)
PLATELET # BLD: 312 E9/L (ref 130–450)
PMV BLD AUTO: 9.5 FL (ref 7–12)
POTASSIUM SERPL-SCNC: 4.1 MMOL/L (ref 3.5–5)
PROTEIN UA: NEGATIVE MG/DL
RBC # BLD: 5.06 E12/L (ref 3.5–5.5)
SODIUM BLD-SCNC: 140 MMOL/L (ref 132–146)
SPECIFIC GRAVITY UA: 1.02 (ref 1–1.03)
T4 FREE: 1.02 NG/DL (ref 0.93–1.7)
TOTAL PROTEIN: 7.1 G/DL (ref 6.4–8.3)
TRIGL SERPL-MCNC: 85 MG/DL (ref 0–149)
TSH SERPL DL<=0.05 MIU/L-ACNC: 3.25 UIU/ML (ref 0.27–4.2)
UROBILINOGEN, URINE: 0.2 E.U./DL
VITAMIN D 25-HYDROXY: 97 NG/ML (ref 30–100)
VLDLC SERPL CALC-MCNC: 17 MG/DL
WBC # BLD: 8.7 E9/L (ref 4.5–11.5)

## 2022-11-15 PROCEDURE — 3078F DIAST BP <80 MM HG: CPT | Performed by: FAMILY MEDICINE

## 2022-11-15 PROCEDURE — 93000 ELECTROCARDIOGRAM COMPLETE: CPT | Performed by: FAMILY MEDICINE

## 2022-11-15 PROCEDURE — 93010 ELECTROCARDIOGRAM REPORT: CPT | Performed by: FAMILY MEDICINE

## 2022-11-15 PROCEDURE — G0009 ADMIN PNEUMOCOCCAL VACCINE: HCPCS | Performed by: FAMILY MEDICINE

## 2022-11-15 PROCEDURE — 3074F SYST BP LT 130 MM HG: CPT | Performed by: FAMILY MEDICINE

## 2022-11-15 PROCEDURE — 90732 PPSV23 VACC 2 YRS+ SUBQ/IM: CPT | Performed by: FAMILY MEDICINE

## 2022-11-15 PROCEDURE — 99396 PREV VISIT EST AGE 40-64: CPT | Performed by: FAMILY MEDICINE

## 2022-11-15 PROCEDURE — G8482 FLU IMMUNIZE ORDER/ADMIN: HCPCS | Performed by: FAMILY MEDICINE

## 2022-11-15 RX ORDER — FLUOXETINE HYDROCHLORIDE 20 MG/1
CAPSULE ORAL
Qty: 270 CAPSULE | Refills: 3 | Status: SHIPPED | OUTPATIENT
Start: 2022-11-15

## 2022-11-15 RX ORDER — LISINOPRIL 10 MG/1
10 TABLET ORAL DAILY
Qty: 90 TABLET | Refills: 3 | Status: SHIPPED | OUTPATIENT
Start: 2022-11-15

## 2022-11-15 RX ORDER — LEVOTHYROXINE SODIUM 0.1 MG/1
TABLET ORAL
Qty: 90 TABLET | Refills: 3 | Status: SHIPPED | OUTPATIENT
Start: 2022-11-15

## 2022-11-15 ASSESSMENT — ENCOUNTER SYMPTOMS
DIARRHEA: 0
CONSTIPATION: 0
NAUSEA: 0
COUGH: 0
BACK PAIN: 1
WHEEZING: 0
VOMITING: 0
SHORTNESS OF BREATH: 0
ABDOMINAL PAIN: 0

## 2022-11-15 NOTE — PROGRESS NOTES
11/15/22  Ora Nascimento : 1970 Sex: female  Age: 50 y.o. Chief Complaint   Patient presents with    Annual Exam     HPI:  50 y.o. female presents today for yearly physical.  Patient's chart, medical, surgical and medication history all reviewed. Well Adult Physical  Patient here for a physical exam.  The patient reports no problems. Do you take any herbs or supplements that were not prescribed by a doctor? yes   Are you taking calcium supplements? no   Are you taking aspirin daily? no    Colonoscopy: No prior colonoscopy  Dental visit: Within last 6 mos  Vision check:  No Problems  PAP:  UTD with Dr. Ochoa Hallmark:  UTD with Dr. Isabel Abbott    Last time routine bloodwork was done:  completed today    Immunization status: up to date and documented. Smoking status: never    Physical activity:  intermittently    ROS:  Review of Systems   Constitutional:  Negative for chills, fatigue and fever. Respiratory:  Negative for cough, shortness of breath and wheezing. Cardiovascular:  Negative for chest pain and palpitations. Gastrointestinal:  Negative for abdominal pain, constipation, diarrhea, nausea and vomiting. Musculoskeletal:  Positive for back pain. Negative for arthralgias. Skin:  Negative for rash. Allergic/Immunologic: Positive for environmental allergies and immunocompromised state. Neurological:  Negative for dizziness and headaches. Psychiatric/Behavioral:  Negative for dysphoric mood. The patient is nervous/anxious. All other systems reviewed and are negative. Current Outpatient Medications on File Prior to Visit   Medication Sig Dispense Refill    norethindrone-ethinyl estradiol (RAMILA ) 1-20 MG-MCG per tablet Take one tablet by mouth daily 3 packet 3    montelukast (SINGULAIR) 10 MG tablet take 1 tablet by mouth at bedtime 90 tablet 1    triamcinolone (ARISTOCORT) 0.5 % cream Apply topically 3 times daily.  15 g 1    ibuprofen (ADVIL;MOTRIN) 600 MG tablet TAKE 1 TABLET BY MOUTH THREE TIMES DAILY WITH FOOD AS NEEDED      clobetasol (TEMOVATE) 0.05 % ointment Apply topically 2 times daily. 60 g 1    lidocaine (XYLOCAINE) 5 % ointment Apply topically as needed. 50 g 11    Turmeric Curcumin 500 MG CAPS Take by mouth      Multiple Vitamins-Minerals (ONE-A-DAY WOMENS PO) Take by mouth      SYMBICORT 160-4.5 MCG/ACT AERO inhale 1 to 2 puffs by mouth every 12 hours Rinse mouth after use      fluticasone (FLONASE) 50 MCG/ACT nasal spray instill 1 to 2 sprays into each nostril every morning      Probiotic Product (PROBIOTIC DAILY PO) Take by mouth LD 4/6/16      Omega-3 Fatty Acids (FISH OIL PO) Take by mouth LD 4/6/16      azelastine (ASTELIN) 137 MCG/SPRAY nasal spray 2 sprays by Nasal route nightly. Use in each nostril as directed        No current facility-administered medications on file prior to visit. Allergies   Allergen Reactions    Prednisone        Past Medical History:   Diagnosis Date    Anxiety     Common variable immunodeficiency ([facility] [address]     FU with Dr. Eric Baumann, no recent issues, stable     Environmental allergies     Kidney stone     Thyroid disease      Past Surgical History:   Procedure Laterality Date    COLONOSCOPY  04/11/2016    LITHOTRIPSY  2019    UPPER GASTROINTESTINAL ENDOSCOPY  04/11/2016    WISDOM TOOTH EXTRACTION       History reviewed. No pertinent family history.   Social History     Socioeconomic History    Marital status: Single     Spouse name: Not on file    Number of children: Not on file    Years of education: Not on file    Highest education level: Not on file   Occupational History    Not on file   Tobacco Use    Smoking status: Never    Smokeless tobacco: Never   Substance and Sexual Activity    Alcohol use: No    Drug use: No    Sexual activity: Not on file   Other Topics Concern    Not on file   Social History Narrative    Not on file     Social Determinants of Health     Financial Resource Strain: Low Risk     Difficulty of Paying Living Expenses: Not hard at all   Food Insecurity: No Food Insecurity    Worried About Running Out of Food in the Last Year: Never true    Ran Out of Food in the Last Year: Never true   Transportation Needs: Not on file   Physical Activity: Not on file   Stress: Not on file   Social Connections: Not on file   Intimate Partner Violence: Not on file   Housing Stability: Not on file       Vitals:    11/15/22 0958   BP: 132/84   Pulse: 96   Temp: 98.4 °F (36.9 °C)   SpO2: 99%   Weight: 146 lb (66.2 kg)   Height: 5' 3.5\" (1.613 m)       Physical Exam:  Physical Exam  Vitals and nursing note reviewed. Constitutional:       General: She is not in acute distress. Appearance: Normal appearance. She is well-developed and normal weight. She is not ill-appearing. HENT:      Head: Normocephalic and atraumatic. Right Ear: Hearing and external ear normal.      Left Ear: Hearing and external ear normal.      Nose: Nose normal.      Mouth/Throat:      Mouth: Mucous membranes are moist.   Eyes:      General: Lids are normal. No scleral icterus. Extraocular Movements: Extraocular movements intact. Conjunctiva/sclera: Conjunctivae normal.   Neck:      Thyroid: No thyromegaly. Vascular: No carotid bruit. Cardiovascular:      Rate and Rhythm: Normal rate and regular rhythm. Heart sounds: Normal heart sounds. No murmur heard. Pulmonary:      Effort: Pulmonary effort is normal. No respiratory distress. Breath sounds: Normal breath sounds. No wheezing. Musculoskeletal:         General: No tenderness or deformity. Normal range of motion. Cervical back: Normal range of motion and neck supple. Right lower leg: No edema. Left lower leg: No edema. Lymphadenopathy:      Cervical: No cervical adenopathy. Skin:     General: Skin is warm and dry. Findings: No rash. Neurological:      General: No focal deficit present.       Mental Status: She is alert and oriented to person, place, and time. Gait: Gait normal.   Psychiatric:         Mood and Affect: Affect normal. Mood is anxious. Speech: Speech normal.         Behavior: Behavior normal.         Thought Content:  Thought content normal.       Labs:  CBC with Differential:    Lab Results   Component Value Date/Time    WBC 11.1 04/19/2022 04:23 PM    RBC 4.80 04/19/2022 04:23 PM    HGB 14.4 04/19/2022 04:23 PM    HCT 45.2 04/19/2022 04:23 PM     04/19/2022 04:23 PM    MCV 94.2 04/19/2022 04:23 PM    MCH 30.0 04/19/2022 04:23 PM    MCHC 31.9 04/19/2022 04:23 PM    RDW 11.9 04/19/2022 04:23 PM    SEGSPCT 63 09/05/2012 09:20 PM    LYMPHOPCT 18.6 04/19/2022 04:23 PM    MONOPCT 3.4 04/19/2022 04:23 PM    BASOPCT 0.4 04/19/2022 04:23 PM    MONOSABS 0.38 04/19/2022 04:23 PM    LYMPHSABS 2.07 04/19/2022 04:23 PM    EOSABS 0.08 04/19/2022 04:23 PM    BASOSABS 0.04 04/19/2022 04:23 PM     CMP:    Lab Results   Component Value Date/Time     04/19/2022 04:23 PM    K 3.9 04/19/2022 04:23 PM    K 3.6 02/17/2020 06:43 PM     04/19/2022 04:23 PM    CO2 26 04/19/2022 04:23 PM    BUN 16 04/19/2022 04:23 PM    CREATININE 0.7 04/19/2022 04:23 PM    GFRAA >60 04/19/2022 04:23 PM    LABGLOM >60 04/19/2022 04:23 PM    GLUCOSE 126 04/19/2022 04:23 PM    PROT 6.4 04/19/2022 04:23 PM    LABALBU 4.0 04/19/2022 04:23 PM    CALCIUM 9.2 04/19/2022 04:23 PM    BILITOT 0.2 04/19/2022 04:23 PM    ALKPHOS 52 04/19/2022 04:23 PM    AST 22 04/19/2022 04:23 PM    ALT 17 04/19/2022 04:23 PM     U/A:    Lab Results   Component Value Date/Time    COLORU Yellow 04/19/2022 04:22 PM    PROTEINU Negative 04/19/2022 04:22 PM    PHUR 6.0 04/19/2022 04:22 PM    LABCAST MODERATE 02/22/2016 02:50 AM    WBCUA 1-3 04/09/2021 10:56 AM    RBCUA 0-1 04/09/2021 10:56 AM    RBCUA 0-1 09/05/2012 09:20 PM    BACTERIA MANY 04/09/2021 10:56 AM    CLARITYU Clear 04/19/2022 04:22 PM    SPECGRAV 1.025 04/19/2022 04:22 PM    LEUKOCYTESUR Negative 04/19/2022 04:22 PM UROBILINOGEN 0.2 04/19/2022 04:22 PM    BILIRUBINUR Negative 04/19/2022 04:22 PM    BLOODU Negative 04/19/2022 04:22 PM    GLUCOSEU Negative 04/19/2022 04:22 PM    AMORPHOUS FEW 02/22/2016 02:50 AM     HgBA1c:    Lab Results   Component Value Date/Time    LABA1C 5.3 04/19/2022 04:23 PM     FLP:    Lab Results   Component Value Date/Time    TRIG 94 04/19/2022 04:23 PM    HDL 78 04/19/2022 04:23 PM    LDLCALC 58 04/19/2022 04:23 PM    LABVLDL 19 04/19/2022 04:23 PM     TSH:    Lab Results   Component Value Date/Time    TSH 1.960 04/19/2022 04:23 PM        Assessment and Plan:  Elvia Daniel was seen today for annual exam.    Diagnoses and all orders for this visit:    Encounter for well adult exam with abnormal findings  -     EKG 12 Lead  -     CBC with Auto Differential; Future  -     Comprehensive Metabolic Panel; Future  -     Hemoglobin A1C; Future  -     Lipid Panel; Future  -     TSH; Future  -     Vitamin D 25 Hydroxy; Future  -     Urinalysis; Future  Patient doing well overall. UTD on HM. Just turned 50 in August, will place referral to have colonoscopy done. Labs completed this AM.     Acquired hypothyroidism  -     levothyroxine (SYNTHROID) 100 MCG tablet; TAKE 1 TABLET DAILY  -     TSH; Future    DESIRAE (generalized anxiety disorder)  -     FLUoxetine (PROZAC) 20 MG capsule; TAKE 1 CAPSULE THREE TIMES A DAY    Primary hypertension  -     lisinopril (PRINIVIL;ZESTRIL) 10 MG tablet; Take 1 tablet by mouth daily  -     CBC with Auto Differential; Future  -     Comprehensive Metabolic Panel; Future  -     Lipid Panel; Future  -     TSH; Future  -     Urinalysis; Future    Screening for colon cancer  -     Sandra Contreras MD, General Surgery, [place]    Need for pneumococcal vaccination  -     Pneumococcal, PPSV23, PNEUMOVAX 21, (age 2 yrs+), SC/IM    Vitamin D insufficiency  -     Vitamin D 25 Hydroxy;  Future    Impaired fasting glucose  -     Hemoglobin A1C; Future      Return in about 6 months (around 5/15/2023), or if symptoms worsen or fail to improve, for Chronic medical conditions.       Seen By:  Christine Decker, DO

## 2022-12-08 ENCOUNTER — TELEPHONE (OUTPATIENT)
Dept: SURGERY | Age: 52
End: 2022-12-08

## 2022-12-08 ENCOUNTER — OFFICE VISIT (OUTPATIENT)
Dept: SURGERY | Age: 52
End: 2022-12-08

## 2022-12-08 VITALS
SYSTOLIC BLOOD PRESSURE: 141 MMHG | WEIGHT: 147 LBS | RESPIRATION RATE: 16 BRPM | BODY MASS INDEX: 25.1 KG/M2 | HEIGHT: 64 IN | DIASTOLIC BLOOD PRESSURE: 99 MMHG | HEART RATE: 105 BPM

## 2022-12-08 DIAGNOSIS — Z12.11 ENCOUNTER FOR SCREENING COLONOSCOPY: Primary | ICD-10-CM

## 2022-12-08 PROCEDURE — 99024 POSTOP FOLLOW-UP VISIT: CPT | Performed by: SURGERY

## 2022-12-08 RX ORDER — SODIUM CHLORIDE 9 MG/ML
INJECTION, SOLUTION INTRAVENOUS CONTINUOUS
OUTPATIENT
Start: 2022-12-08

## 2022-12-08 NOTE — PROGRESS NOTES
General Surgery History and Physical    Patient's Name/Date of Birth: Teofilo Rivera / 1970    Date: 12/8/2022    PCP: Christine Decker DO    Referring Physician:   Misa Soto DO  [phone]    CHIEF COMPLAINT:    Chief Complaint   Patient presents with    New Patient    Colonoscopy     consult         HISTORY OF PRESENT ILLNESS:    Teofilo Rivera is an 50 y.o. female who presents for a colonoscopy. The patient denies any symptoms. No nausea, vomiting, diarrhea, constipation. No changes in stool caliber. No bloody or black stools. No abdominal pain. No unintentional weight loss. No family history of colon cancer. The patient has a known history of: no known risk factors. The patient has never had a colonoscopy before. Past Medical History:   Past Medical History:   Diagnosis Date    Anxiety     Common variable immunodeficiency ([facility] [address]     FU with Dr. Jeovany Keys, no recent issues, stable     Environmental allergies     Kidney stone     Thyroid disease         Past Surgical History:   Past Surgical History:   Procedure Laterality Date    COLONOSCOPY  04/11/2016    LITHOTRIPSY  2019    UPPER GASTROINTESTINAL ENDOSCOPY  04/11/2016    WISDOM TOOTH EXTRACTION          Allergies: Prednisone     Medications:   Current Outpatient Medications   Medication Sig Dispense Refill    lisinopril (PRINIVIL;ZESTRIL) 10 MG tablet Take 1 tablet by mouth daily 90 tablet 3    levothyroxine (SYNTHROID) 100 MCG tablet TAKE 1 TABLET DAILY 90 tablet 3    FLUoxetine (PROZAC) 20 MG capsule TAKE 1 CAPSULE THREE TIMES A  capsule 3    norethindrone-ethinyl estradiol (RAMILA 1/20) 1-20 MG-MCG per tablet Take one tablet by mouth daily 3 packet 3    montelukast (SINGULAIR) 10 MG tablet take 1 tablet by mouth at bedtime 90 tablet 1    triamcinolone (ARISTOCORT) 0.5 % cream Apply topically 3 times daily.  15 g 1    ibuprofen (ADVIL;MOTRIN) 600 MG tablet TAKE 1 TABLET BY MOUTH THREE TIMES DAILY WITH FOOD AS NEEDED clobetasol (TEMOVATE) 0.05 % ointment Apply topically 2 times daily. 60 g 1    lidocaine (XYLOCAINE) 5 % ointment Apply topically as needed. 50 g 11    Turmeric Curcumin 500 MG CAPS Take by mouth      Multiple Vitamins-Minerals (ONE-A-DAY WOMENS PO) Take by mouth      SYMBICORT 160-4.5 MCG/ACT AERO inhale 1 to 2 puffs by mouth every 12 hours Rinse mouth after use      fluticasone (FLONASE) 50 MCG/ACT nasal spray instill 1 to 2 sprays into each nostril every morning      Probiotic Product (PROBIOTIC DAILY PO) Take by mouth LD 4/6/16      Omega-3 Fatty Acids (FISH OIL PO) Take by mouth LD 4/6/16      azelastine (ASTELIN) 137 MCG/SPRAY nasal spray 2 sprays by Nasal route nightly. Use in each nostril as directed        No current facility-administered medications for this visit. Social History:   Social History     Tobacco Use    Smoking status: Never    Smokeless tobacco: Never   Substance Use Topics    Alcohol use: No        Family History: No family history on file. REVIEW OF SYSTEMS:    Constitutional: negative  Eyes: negative  Ears, nose, mouth, throat, and face: negative  Respiratory: negative  Cardiovascular: negative  Gastrointestinal: as in HPI  Genitourinary:negative  Integument/breast: negative  Hematologic/lymphatic: negative  Musculoskeletal:negative  Neurological: negative  Allergic/Immunologic: negative    PHYSICAL EXAM   BP (!) 141/99   Pulse (!) 105   Resp 16   Ht 5' 3.5\" (1.613 m)   Wt 147 lb (66.7 kg)   BMI 25.63 kg/m²     General appearance: alert, cooperative and in no acute distress. Eyes: Grossly normal   Lungs: normal work of breathing  Heart: regular rate  Abdomen:  soft, non-tender, non-distended  Skin: No skin abnormalities  Neurologic: Alert and oriented x 3. Grossly normal  Musculoskeletal: No edema.       ASSESSMENT AND PLAN:     Steffi Gr is an 50 y.o. female who presents for a colonoscopy for screening     I will set the patient up for a colonoscopy, possible biopsy, possible polypectomy. I explained the risks including but not limited to bleeding, perforation leading to possible surgery, or infection. The benefits, alternatives, and potential complications associated with the above procedure to be performed and transfusions when applicable with the patient/responsible person prior to the procedure.        Physician Signature: Electronically signed by Patel Rodarte MD, General Surgery    Send copy of H&P to PCP, Malachi Walsh DO and referring physician, Shantal Adams DO

## 2022-12-08 NOTE — TELEPHONE ENCOUNTER
Prior Authorization Form:      DEMOGRAPHICS:                     Patient Name:  Estela Dao  Patient :  1970            Insurance:  Payor: Jairon Louis / Plan: Alexandria Frost / Product Type: *No Product type* /   Insurance ID Number:    Payer/Plan Subscr  Sex Relation Sub.  Ins. ID Effective Group Num   1. CAR[employer]ELY MY* ENID CALVERT 1970 Female Self [id number]                                   [address]         DIAGNOSIS & PROCEDURE:                       Procedure/Operation: COLONOSCOPY           CPT Code: 69158    Diagnosis:  SCREENING    ICD10 Code: Z12.11    Location:  [facility]    Surgeon:  Slim Mazariegos INFORMATION:                          Date: 2023    Time: 11:00              Anesthesia:  MAC/TIVA                                                       Status:  Outpatient        Special Comments:         Electronically signed by Isaiah Hall MA on 2022 at 10:50 AM

## 2022-12-08 NOTE — PATIENT INSTRUCTIONS
Karyn Mendez MD, FACS    Preoperative Instructions    Please read the following information very carefully. It contains information that is necessary to best prepare you for your upcoming procedure. Make arrangements for a  to take you to and from your procedure. YOU MUST HAVE SOMEONE DRIVE YOU HOME - this cannot be a taxi or public transportation. You will not be administered anesthesia without someone to go home and be at home with you that day. Nothing to eat or drink after midnight the night before your procedure. Follow your bowel prep instructions if you have them for this procedure. 3 days prior to your procedure: Stop taking blood thinners like Coumadin or Plavix or Xarelto. 5 days prior to your procedure: Stop taking Aspirin or Aspirin containing products. If you cannot stop any of these medications prior to your procedure, please contact our office. Medications morning of procedure: Only heart, breathing, blood pressure, and seizure medications are permitted on the morning of your procedure. These medications can be taken with a sip of water. IF YOU ARE UNABLE TO KEEP THE ABOVE SCHEDULED PROCEDURE, YOU MUST NOTIFY DR. OLIVIER'S OFFICE [phone]. NOT THE FACILITY. NO CHEWING GUM OR CHEWING TOBACCO AFTER MIDNIGHT ON DAY OF PROCEDURE.    YOU MUST HAVE TRANSPORTATION TO AND FROM THE FACILITY. What is a colonoscopy? A colonoscopy is a test that lets a doctor look inside your colon. The doctor uses a thin, lighted tube called a colonoscope to look for problems. These include small growths called polyps, cancer, or bleeding. During the test, the doctor can take samples of tissue that can be checked for cancer or other problems. This is called a biopsy. The doctor can also take out polyps. Before the test, you will need to stop eating solid foods. You also will drink a liquid or take a tablet that cleans out your colon.  This helps your doctor be able to see inside your colon during the test.  Follow-up care is a key part of your treatment and safety. Be sure to make and go to all appointments, and call your doctor if you are having problems. It's also a good idea to know your test results and keep a list of the medicines you take. What happens before the procedure? Procedures can be stressful. This information will help you understand what you can expect. And it will help you safely prepare for your procedure. Preparing for the procedure  Understand exactly what procedure is planned, along with the risks, benefits, and other options. Tell your doctors ALL the medicines, vitamins, supplements, and herbal remedies you take. Some of these can increase the risk of bleeding or interact with anesthesia. If you take blood thinners, such as warfarin (Coumadin), clopidogrel (Plavix), or aspirin, be sure to talk to your doctor. He or she will tell you if you should stop taking these medicines before your procedure. Make sure that you understand exactly what your doctor wants you to do. Your doctor will tell you which medicines to take or stop before your procedure. You may need to stop taking certain medicines a week or more before the procedure. So talk to your doctor as soon as you can. If you have an advance directive, let your doctor know. It may include a living will and a durable power of  for health care. Bring a copy to the hospital. If you don't have one, you may want to prepare one. It lets your doctor and loved ones know your health care wishes. Doctors advise that everyone prepare these papers before any type of surgery or procedure. Before the procedure  Follow your doctor's directions about when to stop eating solid foods and drink only clear liquids. You can drink water, clear juices, clear broths, flavored ice pops, and gelatin (such as Jell-O). Do not eat or drink anything red or purple.  This includes grape juice and grape-flavored ice pops. It also includes fruit punch and cherry gelatin. Drink the \"colon prep\" liquid as your doctor tells you. You will want to stay home, because the liquid will make you go to the bathroom a lot. Your stools will be loose and watery. It is very important to drink all of the liquid. If you have problems drinking it, call your doctor. Some doctors may have you take a tablet rather than drink a liquid. Do not eat any solid foods after you drink the colon prep. Stop drinking clear liquids 6 to 8 hours before the test.  What happens on the day of the procedure? Follow the instructions exactly about when to stop eating and drinking. If you don't, your procedure may be canceled. If your doctor told you to take your medicines on the day of the procedure, take them with only a sip of water. Take a bath or shower before you come in for your procedure. Do not apply lotions, perfumes, deodorants, or nail polish. Take off all jewelry and piercings. And take out contact lenses, if you wear them. At the [address] [place] [address] or hospital  Bring a picture ID. You will be kept comfortable and safe by your anesthesia provider. The anesthesia may make you sleep. You will lie on your back or your side with your knees drawn up toward your belly. The doctor will gently put a gloved finger into your anus. Then the doctor puts the scope in and moves it into your colon. The scope goes in easily because it is lubricated. The doctor may also use small tools to take tissue samples for a biopsy or to remove polyps. This does not hurt. The test usually takes 30 to 45 minutes. But it may take longer. It depends on what is found and what is done. Going home  Be sure you have someone to drive you home. Anesthesia and pain medicine make it unsafe for you to drive. You will be given more specific instructions about recovering from your procedure. When should you call your doctor? You have questions or concerns.   You don't understand how to prepare for your procedure. You are having trouble with the bowel prep. You become ill before the procedure (such as fever, flu, or a cold). You need to reschedule or have changed your mind about having the procedure. Where can you learn more? Go to [email or web address] and sign in to your [employer] account. Enter C315 in the [employer] box to learn more about Colonoscopy: Before Your Procedure.     If you do not have an account, please click on the Sign Up Now link. © 6662-0110 [employer]. Care instructions adapted under license by [facility] ([facility]). This care instruction is for use with your licensed healthcare professional. If you have questions about a medical condition or this instruction, always ask your healthcare professional. [address] any warranty or liability for your use of this information.   Content Version: 56.8.008338; Current as of: November 20, 2015

## 2023-01-03 RX ORDER — BUDESONIDE AND FORMOTEROL FUMARATE DIHYDRATE 160; 4.5 UG/1; UG/1
AEROSOL RESPIRATORY (INHALATION)
Qty: 10.2 G | Refills: 5 | Status: SHIPPED | OUTPATIENT
Start: 2023-01-03

## 2023-01-05 ENCOUNTER — TELEPHONE (OUTPATIENT)
Dept: PRIMARY CARE CLINIC | Age: 53
End: 2023-01-05

## 2023-01-05 RX ORDER — BUDESONIDE AND FORMOTEROL FUMARATE DIHYDRATE 160; 4.5 UG/1; UG/1
2 AEROSOL RESPIRATORY (INHALATION) 2 TIMES DAILY
Qty: 3 EACH | Refills: 1 | Status: SHIPPED | OUTPATIENT
Start: 2023-01-05

## 2023-01-05 NOTE — TELEPHONE ENCOUNTER
The pt had the Symbicort transferred over to mail order so it can be mailed to her home, but they are telling her that it needs prior authorized before it can be sent

## 2023-01-07 PROBLEM — Z12.11 SPECIAL SCREENING FOR MALIGNANT NEOPLASMS, COLON: Status: RESOLVED | Noted: 2022-12-08 | Resolved: 2023-01-07

## 2023-01-30 RX ORDER — FLUTICASONE PROPIONATE 50 MCG
SPRAY, SUSPENSION (ML) NASAL
Qty: 3 EACH | Refills: 1 | Status: SHIPPED | OUTPATIENT
Start: 2023-01-30

## 2023-02-02 ENCOUNTER — TELEPHONE (OUTPATIENT)
Dept: SURGERY | Age: 53
End: 2023-02-02

## 2023-03-06 ENCOUNTER — HOSPITAL ENCOUNTER (INPATIENT)
Age: 53
LOS: 3 days | Discharge: HOME | End: 2023-03-09
Attending: EMERGENCY MEDICINE | Admitting: PSYCHIATRY & NEUROLOGY
Payer: COMMERCIAL

## 2023-03-06 DIAGNOSIS — R45.851 DEPRESSION WITH SUICIDAL IDEATION: Primary | ICD-10-CM

## 2023-03-06 DIAGNOSIS — F32.A DEPRESSION WITH SUICIDAL IDEATION: Primary | ICD-10-CM

## 2023-03-06 PROBLEM — F32.9 MAJOR DEPRESSION, SINGLE EPISODE: Status: ACTIVE | Noted: 2023-03-06

## 2023-03-06 LAB
ACETAMINOPHEN LEVEL: <5 MCG/ML (ref 10–30)
ALBUMIN SERPL-MCNC: 4 G/DL (ref 3.5–5.2)
ALP BLD-CCNC: 58 U/L (ref 35–104)
ALT SERPL-CCNC: 19 U/L (ref 0–32)
AMPHETAMINE SCREEN, URINE: NOT DETECTED
ANION GAP SERPL CALCULATED.3IONS-SCNC: 11 MMOL/L (ref 7–16)
AST SERPL-CCNC: 22 U/L (ref 0–31)
BARBITURATE SCREEN URINE: NOT DETECTED
BASOPHILS ABSOLUTE: 0.03 E9/L (ref 0–0.2)
BASOPHILS RELATIVE PERCENT: 0.3 % (ref 0–2)
BENZODIAZEPINE SCREEN, URINE: NOT DETECTED
BILIRUB SERPL-MCNC: 0.3 MG/DL (ref 0–1.2)
BILIRUBIN URINE: NEGATIVE
BLOOD, URINE: NEGATIVE
BUN BLDV-MCNC: 16 MG/DL (ref 6–20)
CALCIUM SERPL-MCNC: 9.4 MG/DL (ref 8.6–10.2)
CANNABINOID SCREEN URINE: NOT DETECTED
CHLORIDE BLD-SCNC: 104 MMOL/L (ref 98–107)
CLARITY: CLEAR
CO2: 22 MMOL/L (ref 22–29)
COCAINE METABOLITE SCREEN URINE: NOT DETECTED
COLOR: YELLOW
CREAT SERPL-MCNC: 0.7 MG/DL (ref 0.5–1)
EKG ATRIAL RATE: 107 BPM
EKG P AXIS: 46 DEGREES
EKG P-R INTERVAL: 166 MS
EKG Q-T INTERVAL: 358 MS
EKG QRS DURATION: 84 MS
EKG QTC CALCULATION (BAZETT): 477 MS
EKG R AXIS: 56 DEGREES
EKG T AXIS: 67 DEGREES
EKG VENTRICULAR RATE: 107 BPM
EOSINOPHILS ABSOLUTE: 0.02 E9/L (ref 0.05–0.5)
EOSINOPHILS RELATIVE PERCENT: 0.2 % (ref 0–6)
ETHANOL: <10 MG/DL (ref 0–0.08)
FENTANYL SCREEN, URINE: NOT DETECTED
GFR SERPL CREATININE-BSD FRML MDRD: >60 ML/MIN/1.73
GLUCOSE BLD-MCNC: 120 MG/DL (ref 74–99)
GLUCOSE URINE: NEGATIVE MG/DL
HCG(URINE) PREGNANCY TEST: NEGATIVE
HCT VFR BLD CALC: 44.1 % (ref 34–48)
HEMOGLOBIN: 14.8 G/DL (ref 11.5–15.5)
IMMATURE GRANULOCYTES #: 0.02 E9/L
IMMATURE GRANULOCYTES %: 0.2 % (ref 0–5)
INFLUENZA A: NOT DETECTED
INFLUENZA B: NOT DETECTED
KETONES, URINE: NEGATIVE MG/DL
LEUKOCYTE ESTERASE, URINE: NEGATIVE
LYMPHOCYTES ABSOLUTE: 0.99 E9/L (ref 1.5–4)
LYMPHOCYTES RELATIVE PERCENT: 11.2 % (ref 20–42)
Lab: NORMAL
MCH RBC QN AUTO: 29.7 PG (ref 26–35)
MCHC RBC AUTO-ENTMCNC: 33.6 % (ref 32–34.5)
MCV RBC AUTO: 88.4 FL (ref 80–99.9)
METHADONE SCREEN, URINE: NOT DETECTED
MONOCYTES ABSOLUTE: 0.33 E9/L (ref 0.1–0.95)
MONOCYTES RELATIVE PERCENT: 3.7 % (ref 2–12)
NEUTROPHILS ABSOLUTE: 7.44 E9/L (ref 1.8–7.3)
NEUTROPHILS RELATIVE PERCENT: 84.4 % (ref 43–80)
NITRITE, URINE: NEGATIVE
OPIATE SCREEN URINE: NOT DETECTED
OXYCODONE URINE: NOT DETECTED
PDW BLD-RTO: 12.2 FL (ref 11.5–15)
PH UA: 6 (ref 5–9)
PHENCYCLIDINE SCREEN URINE: NOT DETECTED
PLATELET # BLD: 272 E9/L (ref 130–450)
PMV BLD AUTO: 9.3 FL (ref 7–12)
POTASSIUM SERPL-SCNC: 3.9 MMOL/L (ref 3.5–5)
PROTEIN UA: NEGATIVE MG/DL
RBC # BLD: 4.99 E12/L (ref 3.5–5.5)
SALICYLATE, SERUM: <0.3 MG/DL (ref 0–30)
SARS-COV-2 RNA, RT PCR: NOT DETECTED
SODIUM BLD-SCNC: 137 MMOL/L (ref 132–146)
SPECIFIC GRAVITY UA: <=1.005 (ref 1–1.03)
TOTAL PROTEIN: 7 G/DL (ref 6.4–8.3)
TRICYCLIC ANTIDEPRESSANTS SCREEN SERUM: NEGATIVE NG/ML
UROBILINOGEN, URINE: 0.2 E.U./DL
WBC # BLD: 8.8 E9/L (ref 4.5–11.5)

## 2023-03-06 PROCEDURE — 85025 COMPLETE CBC W/AUTO DIFF WBC: CPT

## 2023-03-06 PROCEDURE — 81025 URINE PREGNANCY TEST: CPT

## 2023-03-06 PROCEDURE — 93005 ELECTROCARDIOGRAM TRACING: CPT | Performed by: EMERGENCY MEDICINE

## 2023-03-06 PROCEDURE — 80307 DRUG TEST PRSMV CHEM ANLYZR: CPT

## 2023-03-06 PROCEDURE — 93010 ELECTROCARDIOGRAM REPORT: CPT | Performed by: INTERNAL MEDICINE

## 2023-03-06 PROCEDURE — 82077 ASSAY SPEC XCP UR&BREATH IA: CPT

## 2023-03-06 PROCEDURE — 80143 DRUG ASSAY ACETAMINOPHEN: CPT

## 2023-03-06 PROCEDURE — 6370000000 HC RX 637 (ALT 250 FOR IP): Performed by: EMERGENCY MEDICINE

## 2023-03-06 PROCEDURE — 1240000000 HC EMOTIONAL WELLNESS R&B

## 2023-03-06 PROCEDURE — 81003 URINALYSIS AUTO W/O SCOPE: CPT

## 2023-03-06 PROCEDURE — 80053 COMPREHEN METABOLIC PANEL: CPT

## 2023-03-06 PROCEDURE — 87636 SARSCOV2 & INF A&B AMP PRB: CPT

## 2023-03-06 PROCEDURE — 99285 EMERGENCY DEPT VISIT HI MDM: CPT

## 2023-03-06 PROCEDURE — 80179 DRUG ASSAY SALICYLATE: CPT

## 2023-03-06 RX ORDER — ACETAMINOPHEN 325 MG/1
650 TABLET ORAL ONCE
Status: COMPLETED | OUTPATIENT
Start: 2023-03-06 | End: 2023-03-06

## 2023-03-06 RX ORDER — FLUOXETINE HYDROCHLORIDE 20 MG/1
20 CAPSULE ORAL DAILY
Status: DISCONTINUED | OUTPATIENT
Start: 2023-03-06 | End: 2023-03-06

## 2023-03-06 RX ORDER — LISINOPRIL 10 MG/1
10 TABLET ORAL ONCE
Status: COMPLETED | OUTPATIENT
Start: 2023-03-06 | End: 2023-03-06

## 2023-03-06 RX ADMIN — LISINOPRIL 10 MG: 10 TABLET ORAL at 19:39

## 2023-03-06 RX ADMIN — ACETAMINOPHEN 650 MG: 325 TABLET ORAL at 22:21

## 2023-03-06 RX ADMIN — FLUOXETINE 20 MG: 20 CAPSULE ORAL at 19:40

## 2023-03-06 ASSESSMENT — PAIN - FUNCTIONAL ASSESSMENT
PAIN_FUNCTIONAL_ASSESSMENT: NONE - DENIES PAIN
PAIN_FUNCTIONAL_ASSESSMENT: NONE - DENIES PAIN
PAIN_FUNCTIONAL_ASSESSMENT: 0-10
PAIN_FUNCTIONAL_ASSESSMENT: NONE - DENIES PAIN

## 2023-03-06 ASSESSMENT — PAIN SCALES - GENERAL: PAINLEVEL_OUTOF10: 5

## 2023-03-06 ASSESSMENT — PAIN DESCRIPTION - FREQUENCY: FREQUENCY: CONTINUOUS

## 2023-03-06 ASSESSMENT — LIFESTYLE VARIABLES: HOW OFTEN DO YOU HAVE A DRINK CONTAINING ALCOHOL: NEVER

## 2023-03-06 ASSESSMENT — PAIN DESCRIPTION - ONSET: ONSET: ON-GOING

## 2023-03-06 ASSESSMENT — PAIN DESCRIPTION - LOCATION: LOCATION: HEAD

## 2023-03-06 NOTE — ED NOTES
Behavioral Health Crisis Assessment      Chief Complaint: The pt is a 50 yr old white female who presents to the ED for SI and a plan to jump off a bridge. Mental Status Exam: The pt presents somewhat anxious with a flat affect and depressed mood. She is oriented times 4 and is a good historian. She denied a hx of AVH and HI. She has poor judgement and insight. She has fair eye contact and hygiene. Legal Status:  [] Voluntary:  [x] Involuntary, Issued by: ED doc    Gender:  [] Male [x] Female [] Transgender  [] Other    Sexual Orientation:  [x] Heterosexual [] Homosexual [] Bisexual [] Other    Brief Clinical Summary:  The pt stated that her brother is leaving at the end of this month for [place]. She stated that she has been living with her bro for 2 years. Prior to this she lived in an apartment. The pt stated that her income is low and with the prices are so high now that she cannot afford it due to being on SSi and working part time. The pt stated that she does not want to live with mom and wants to be alone. The pt stated that since Aug of 2020 she has been looking for a condo but since then with inflation - it has made it impossible to get one and mom cant afford it now. The pt stated that in Oct 2022 her brother said he was going to be moving. She stated that in the last couple weeks she has had increased SI. She stated that the plan to jump off a bridge started a month ago. She stated that she thought of a bridge in [place] that she looked. She stated that she applied for an apartment recently and it was complicated process and she got frustrated. She stated that her bro has known that she was suicidal and today she was crying a lot and telling her brother she was more suicidal and he told her to come in. The pt denied a hx of SI, HI, AVH, AOD and inpt psych admits. The pt is now stating that she feels better and wants to go home. Pt made aware of pink slip.     Collateral Information: Mom present but did not offer any info    Risk Factors:  Needs stable housing     Financial difficulties     Autism diagnosis     Hx of trauma    Protective Factors: No hx of attempts     No hx of admits     Has supportive family     Initiated ED visit     Good communication skills    Suicidal Ideations:  [x] Reports: Pt reported plan for the last month to jump off a bridge   [] Past [x] Present   [] Denies    Suicide Attempts:  [] Reports:   [x] Denies    C-SSRS Screening Completed by RN: Current Suicide Risk:  [] No Risk [] Low [] Moderate [] High    Homicidal Ideations:  [] Reports:   [] Past [] Present   [x] Denies     Self Injurious/Self Mutilation Behaviors:  [] Reports:    [] Past [] Present   [x] Denies    Hallucinations/Delusions:  [] Reports:   [x] Denies     Substance Use/Alcohol Use/Addiction:  [] Reports:   [x] Denies   [x] SBIRT Screen Complete. Current or Past Substance Abuse Treatment:  [] Yes, When and Where:  [x] No    Current or Past Mental Health Treatment:  [x] Yes, When and Where: Pt reported age 23 went to [address] [facility] for a couple yrs for counseling- never hospitalized. On Prozac 20mg TID- through PCP- on for over 20yrs. [] No    Legal Issues:  []  Yes (Specify)  [x]  No    Access to Weapons:  []  Yes (Specify)  [x]  No    Trauma History:  [x] Reports: Pt stated that she was bullied in school  [] Denies     Living Situation:  The pt stated that she has been living with her brother for 2 years    Employment: Stated she is an  at the [employer] there since 2006    Education Level:  Some college    Violence Risk Screening:        Have you ever thought about hurting someone? [x]  No  []  Yes (Ask the questions listed below)   When? Did you follow through with the thoughts? [] No     [] Yes- When and what happened? 2.  Have you ever threatened anyone?   [x]  No  []  Yes (Ask the questions listed below)   When and what happened? Have you ever threatened someone with a gun, knife or other weapon? []  No  []  Yes - When and what happened? 2. Have you ever had an order of protection taken out against you? []  Yes [x]  No  3. Have you ever been arrested due to violence? []  Yes [x]  No  4. Have you ever been cruel to animals?  []  Yes [x]  No    After consideration of C-SSRS screening results, C-SSRS assessments, and this professional's assessment the patient's overall suicide risk assessed to be:  [] No Risk  [] Low   [x] Moderate   [] High     [x] Discussed current suicide risk, protective and risk factors with RN and ED Physician     Disposition:  [] Home:   [] Outpatient Provider:   [] Crisis Unit:   [x] Inpatient Psychiatric Unit:  [address] [place] [address]  [] Other:                    Narda Douglass, [facility]  03/06/23 [address], [facility]  03/06/23 3432

## 2023-03-06 NOTE — ED NOTES
This rn has discussed several times the plan of care however pt is focused on going home and reports to this rn she is autistic.   Pt requesting to take her home meds from her belongings, informed pt thata as soon as this rn ais able to [place] will speak with  concerning her meds      Alan Henry, AUBREY  03/06/23 7649

## 2023-03-06 NOTE — ED PROVIDER NOTES
[address]        Pt Name: Gale Sarmiento  MRN: [id number]  [place] 1970  Date of evaluation: 3/6/2023  Provider: Tilford Nageotte, MD  PCP: Savannah Perez DO  Note Started: 12:36 PM EST 3/6/23    CHIEF COMPLAINT       Chief Complaint   Patient presents with    Suicidal     Pt states she has been suicidal for a couple of months. Plan to jump off a bridge. HISTORY OF PRESENT ILLNESS: 1 or more Elements        Limitations to history : None    Gale Sarmiento is a 50 y.o. female who presents for depression with suicidal ideation. Here for evaluation of depression and suicidal thoughts. She reports worsening suicidal thoughts for several months. She reports that she is having thoughts about jumping off a bridge. She denies any medical complaints. Nursing Notes were all reviewed and agreed with or any disagreements were addressed in the HPI. REVIEW OF EXTERNAL NOTE :       12/8/22 outpatient note from Dr. Deyanira Hernandez  11/15/22 PCP note    Controlled Substance Monitoring:    Acute and Chronic Pain Monitoring:   No flowsheet data found. REVIEW OF SYSTEMS :      Positives and Pertinent negatives as per HPI. SURGICAL HISTORY     Past Surgical History:   Procedure Laterality Date    COLONOSCOPY  04/11/2016    LITHOTRIPSY  2019    UPPER GASTROINTESTINAL ENDOSCOPY  04/11/2016    WISDOM TOOTH EXTRACTION         CURRENTMEDICATIONS       Previous Medications    AZELASTINE (ASTELIN) 137 MCG/SPRAY NASAL SPRAY    2 sprays by Nasal route nightly. Use in each nostril as directed     BUDESONIDE-FORMOTEROL (SYMBICORT) 160-4.5 MCG/ACT AERO    inhale 1 to 2 puffs by mouth every 12 hours Rinse mouth after use    CLOBETASOL (TEMOVATE) 0.05 % OINTMENT    Apply topically 2 times daily.     FLUOXETINE (PROZAC) 20 MG CAPSULE    TAKE 1 CAPSULE THREE TIMES A DAY    FLUTICASONE (FLONASE) 50 MCG/ACT NASAL SPRAY instill 1 to 2 sprays into each nostril every morning    IBUPROFEN (ADVIL;MOTRIN) 600 MG TABLET    TAKE 1 TABLET BY MOUTH THREE TIMES DAILY WITH FOOD AS NEEDED    LEVOTHYROXINE (SYNTHROID) 100 MCG TABLET    TAKE 1 TABLET DAILY    LIDOCAINE (XYLOCAINE) 5 % OINTMENT    Apply topically as needed. LISINOPRIL (PRINIVIL;ZESTRIL) 10 MG TABLET    Take 1 tablet by mouth daily    MONTELUKAST (SINGULAIR) 10 MG TABLET    take 1 tablet by mouth at bedtime    MULTIPLE VITAMINS-MINERALS (ONE-A-DAY WOMENS PO)    Take by mouth    NORETHINDRONE-ETHINYL ESTRADIOL (RAMILA 1/20) 1-20 MG-MCG PER TABLET    Take one tablet by mouth daily    OMEGA-3 FATTY ACIDS (FISH OIL PO)    Take by mouth LD 4/6/16    PROBIOTIC PRODUCT (PROBIOTIC DAILY PO)    Take by mouth LD 4/6/16    SYMBICORT 160-4.5 MCG/ACT AERO    Inhale 2 puffs into the lungs 2 times daily    TRIAMCINOLONE (ARISTOCORT) 0.5 % CREAM    Apply topically 3 times daily. TURMERIC CURCUMIN 500 MG CAPS    Take by mouth       ALLERGIES     Prednisone    FAMILYHISTORY     History reviewed. No pertinent family history.      SOCIAL HISTORY       Social History     Tobacco Use    Smoking status: Never    Smokeless tobacco: Never   Substance Use Topics    Alcohol use: No    Drug use: No       SCREENINGS        [place] Coma Scale  Eye Opening: Spontaneous  Best Verbal Response: Oriented  Best Motor Response: Obeys commands  [place] Coma Scale Score: 15                CIWA Assessment  BP: (!) 160/98  Heart Rate: (!) 125           PHYSICAL EXAM  1 or more Elements     ED Triage Vitals [03/06/23 1231]   BP Temp Temp Source Heart Rate Resp SpO2 Height Weight   (!) 146/91 98 °F (36.7 °C) Oral (!) 115 18 98 % 5' 3\" (1.6 m) 140 lb (63.5 kg)              Constitutional/General: Alert and oriented x3  Head: Normocephalic and atraumatic  Eyes: PERRL, EOMI, conjunctiva normal, sclera non icteric  ENT:  Oropharynx clear, handling secretions  Neck: Supple, full ROM, no stridor, no meningeal signs  Respiratory: Lungs clear to auscultation bilaterally,  Not in respiratory distress  Cardiovascular:  Regular rate. Regular rhythm. Musculoskeletal: Moves all extremities x 4. Warm and well perfused, no clubbing, no cyanosis, no edema. Capillary refill <3 seconds  Integument: skin warm and dry. No rashes. Neurologic: GCS 15, no focal deficits  Psychiatric: flat affect            DIAGNOSTIC RESULTS   LABS:    Labs Reviewed   COMPREHENSIVE METABOLIC PANEL - Abnormal; Notable for the following components:       Result Value    Glucose 120 (*)     All other components within normal limits   CBC WITH AUTO DIFFERENTIAL - Abnormal; Notable for the following components:    Neutrophils % 84.4 (*)     Lymphocytes % 11.2 (*)     Neutrophils Absolute 7.44 (*)     Lymphocytes Absolute 0.99 (*)     Eosinophils Absolute 0.02 (*)     All other components within normal limits   SERUM DRUG SCREEN - Abnormal; Notable for the following components:    Acetaminophen Level <5.0 (*)     All other components within normal limits   URINE DRUG SCREEN   URINALYSIS   PREGNANCY, URINE       As interpreted by me, the above displayed labs are abnormal. All other labs obtained during this visit were within normal range or not returned as of this dictation. RADIOLOGY:   Unless a wet read is documented in the ED course or MDM, non-plain film images such as CT, Ultrasound and MRI are read by the radiologist unless otherwise specified in the medical decision-making. Plain radiographic images are preliminarily interpreted by this ED Provider with the findings documented in the ED Course with official radiology read to follow. Interpretation per the Radiologist below, if available at the time of this note:    No orders to display     No results found. No results found.       PAST MEDICAL HISTORY/Chronic Conditions Affecting Care      has a past medical history of Anxiety, Common variable immunodeficiency ([facility] [address] Environmental allergies, Kidney stone, and Thyroid disease. EMERGENCY DEPARTMENT COURSE    Vitals:    Vitals:    03/06/23 1231 03/06/23 1233   BP: (!) 146/91 (!) 160/98   Pulse: (!) 115 (!) 125   Resp: 18 18   Temp: 98 °F (36.7 °C) (!) 96.7 °F (35.9 °C)   TempSrc: Oral    SpO2: 98% 99%   Weight: 140 lb (63.5 kg) 147 lb (66.7 kg)   Height: 5' 3\" (1.6 m)        Patient was given the following medications:  Medications - No data to display             Medical Decision Making/Differential Diagnosis:    CC/HPI Summary, Social Determinants of health, Records Reviewed, DDx, testing done/not done, ED Course, Reassessment, disposition considerations/shared decision making with patient, consults, disposition:        ED Course as of 03/06/23 1517   Mon Mar 06, 2023   1332 EKG Interpretation  Interpreted by emergency department physician, Dr. Sunni Butterfield     3/6/23  Time: 4854    Rhythm: sinus tachycardia  Rate: tachycardia  Axis: normal  Conduction: normal  ST Segments: no acute change  T Waves: no acute change    Clinical Impression: sinus tachycardia, no acute ischemic changes  Comparison to Old EKG  Stable from prior EKG       [CD]      ED Course User Index  [CD] Trevor Abraham MD       Medical Decision Making   Differential Diagnosis includes but not limited to: substance abuse, severe depression, metabolic abnormalities. Fortunately her work-up in the ED was reassuring. CMP with glucose 120, otherwise normal, CBC normal, serum drug screen negative, urine drug screen negative, urinalysis negative. EKG without ST segment abnormalities. Considered imaging but not indicated based on lack of symptoms. She is medically stable for mental health evaluation. Consult placed to behavioral health /psychiatry for further evaluation and disposition based on her suicidal thoughts with plans to jump off a bridge.       Problems Addressed:  Depression with suicidal ideation: acute illness or injury    Amount and/or Complexity of Data Reviewed  External Data Reviewed: notes. Labs: ordered. Decision-making details documented in ED Course. ECG/medicine tests: ordered and independent interpretation performed. Decision-making details documented in ED Course. Risk  Decision regarding hospitalization. CONSULTS:   IP CONSULT TO SOCIAL WORK         PROCEDURES   Unless otherwise noted below, none       CRITICAL CARE TIME (.cct)   none        I am the Primary Clinician of Record. FINAL IMPRESSION      1. Depression with suicidal ideation          DISPOSITION/PLAN     [address] 03/06/2023 01:33:32 PM      PATIENT REFERRED TO:  No follow-up provider specified.     DISCHARGE MEDICATIONS:  New Prescriptions    No medications on file       DISCONTINUED MEDICATIONS:  Discontinued Medications    No medications on file              (Please note that portions of this note were completed with a voice recognition program.  Efforts were made to edit the dictations but occasionally words are mis-transcribed.)    Alexander Wyatt MD (electronically signed)           Leila Claudio MD  03/06/23 4941

## 2023-03-07 ENCOUNTER — TELEPHONE (OUTPATIENT)
Dept: FAMILY MEDICINE CLINIC | Age: 53
End: 2023-03-07

## 2023-03-07 PROBLEM — F32.9 MAJOR DEPRESSION, SINGLE EPISODE: Status: RESOLVED | Noted: 2023-03-06 | Resolved: 2023-03-07

## 2023-03-07 PROBLEM — F84.0 AUTISTIC DISORDER: Status: ACTIVE | Noted: 2023-03-07

## 2023-03-07 PROCEDURE — 6370000000 HC RX 637 (ALT 250 FOR IP): Performed by: PSYCHIATRY & NEUROLOGY

## 2023-03-07 PROCEDURE — 6370000000 HC RX 637 (ALT 250 FOR IP): Performed by: NURSE PRACTITIONER

## 2023-03-07 PROCEDURE — 1240000000 HC EMOTIONAL WELLNESS R&B

## 2023-03-07 PROCEDURE — 94640 AIRWAY INHALATION TREATMENT: CPT

## 2023-03-07 PROCEDURE — 94664 DEMO&/EVAL PT USE INHALER: CPT

## 2023-03-07 PROCEDURE — 6360000002 HC RX W HCPCS: Performed by: NURSE PRACTITIONER

## 2023-03-07 RX ORDER — HALOPERIDOL 5 MG/1
5 TABLET ORAL EVERY 6 HOURS PRN
Status: DISCONTINUED | OUTPATIENT
Start: 2023-03-07 | End: 2023-03-09 | Stop reason: HOSPADM

## 2023-03-07 RX ORDER — ACETAMINOPHEN 325 MG/1
650 TABLET ORAL EVERY 6 HOURS PRN
Status: DISCONTINUED | OUTPATIENT
Start: 2023-03-07 | End: 2023-03-09 | Stop reason: HOSPADM

## 2023-03-07 RX ORDER — NORETHINDRONE ACETATE AND ETHINYL ESTRADIOL 1; .02 MG/1; MG/1
1 TABLET ORAL DAILY
Status: DISCONTINUED | OUTPATIENT
Start: 2023-03-07 | End: 2023-03-09 | Stop reason: HOSPADM

## 2023-03-07 RX ORDER — M-VIT,TX,IRON,MINS/CALC/FOLIC 27MG-0.4MG
1 TABLET ORAL DAILY
Status: DISCONTINUED | OUTPATIENT
Start: 2023-03-07 | End: 2023-03-07 | Stop reason: SDUPTHER

## 2023-03-07 RX ORDER — BUDESONIDE AND FORMOTEROL FUMARATE DIHYDRATE 160; 4.5 UG/1; UG/1
1 AEROSOL RESPIRATORY (INHALATION) 2 TIMES DAILY
Status: DISCONTINUED | OUTPATIENT
Start: 2023-03-07 | End: 2023-03-07 | Stop reason: CLARIF

## 2023-03-07 RX ORDER — LISINOPRIL 20 MG/1
10 TABLET ORAL DAILY
Status: DISCONTINUED | OUTPATIENT
Start: 2023-03-07 | End: 2023-03-09 | Stop reason: HOSPADM

## 2023-03-07 RX ORDER — CLARITHROMYCIN 250 MG/1
250 TABLET, FILM COATED ORAL DAILY
Status: DISCONTINUED | OUTPATIENT
Start: 2023-03-07 | End: 2023-03-09 | Stop reason: HOSPADM

## 2023-03-07 RX ORDER — ARFORMOTEROL TARTRATE 15 UG/2ML
15 SOLUTION RESPIRATORY (INHALATION) 2 TIMES DAILY
Status: DISCONTINUED | OUTPATIENT
Start: 2023-03-07 | End: 2023-03-09 | Stop reason: HOSPADM

## 2023-03-07 RX ORDER — AZELASTINE 1 MG/ML
1 SPRAY, METERED NASAL 2 TIMES DAILY
Status: DISCONTINUED | OUTPATIENT
Start: 2023-03-07 | End: 2023-03-09 | Stop reason: HOSPADM

## 2023-03-07 RX ORDER — MAGNESIUM HYDROXIDE/ALUMINUM HYDROXICE/SIMETHICONE 120; 1200; 1200 MG/30ML; MG/30ML; MG/30ML
30 SUSPENSION ORAL PRN
Status: DISCONTINUED | OUTPATIENT
Start: 2023-03-07 | End: 2023-03-09 | Stop reason: HOSPADM

## 2023-03-07 RX ORDER — LEVOTHYROXINE SODIUM 0.1 MG/1
100 TABLET ORAL DAILY
Status: DISCONTINUED | OUTPATIENT
Start: 2023-03-07 | End: 2023-03-09 | Stop reason: HOSPADM

## 2023-03-07 RX ORDER — CETIRIZINE HYDROCHLORIDE 10 MG/1
10 TABLET ORAL DAILY
COMMUNITY

## 2023-03-07 RX ORDER — HYDROXYZINE PAMOATE 25 MG/1
50 CAPSULE ORAL 3 TIMES DAILY PRN
Status: DISCONTINUED | OUTPATIENT
Start: 2023-03-07 | End: 2023-03-09 | Stop reason: HOSPADM

## 2023-03-07 RX ORDER — BUDESONIDE 0.5 MG/2ML
0.5 INHALANT ORAL 2 TIMES DAILY
Status: DISCONTINUED | OUTPATIENT
Start: 2023-03-07 | End: 2023-03-09 | Stop reason: HOSPADM

## 2023-03-07 RX ORDER — RISPERIDONE 0.5 MG/1
0.5 TABLET, ORALLY DISINTEGRATING ORAL NIGHTLY
Status: DISCONTINUED | OUTPATIENT
Start: 2023-03-07 | End: 2023-03-08

## 2023-03-07 RX ORDER — HALOPERIDOL 5 MG/ML
5 INJECTION INTRAMUSCULAR EVERY 6 HOURS PRN
Status: DISCONTINUED | OUTPATIENT
Start: 2023-03-07 | End: 2023-03-09 | Stop reason: HOSPADM

## 2023-03-07 RX ORDER — BUDESONIDE AND FORMOTEROL FUMARATE DIHYDRATE 160; 4.5 UG/1; UG/1
2 AEROSOL RESPIRATORY (INHALATION) 2 TIMES DAILY
Status: DISCONTINUED | OUTPATIENT
Start: 2023-03-07 | End: 2023-03-07 | Stop reason: SDUPTHER

## 2023-03-07 RX ORDER — FLUOXETINE 10 MG/1
30 TABLET, FILM COATED ORAL DAILY
Status: DISCONTINUED | OUTPATIENT
Start: 2023-03-07 | End: 2023-03-07

## 2023-03-07 RX ORDER — MONTELUKAST SODIUM 10 MG/1
10 TABLET ORAL NIGHTLY
Status: DISCONTINUED | OUTPATIENT
Start: 2023-03-07 | End: 2023-03-09 | Stop reason: HOSPADM

## 2023-03-07 RX ORDER — CLARITHROMYCIN 250 MG/1
250 TABLET, FILM COATED ORAL DAILY
COMMUNITY

## 2023-03-07 RX ORDER — OXCARBAZEPINE 150 MG/1
150 TABLET, FILM COATED ORAL 2 TIMES DAILY
Status: DISCONTINUED | OUTPATIENT
Start: 2023-03-07 | End: 2023-03-08

## 2023-03-07 RX ORDER — CETIRIZINE HYDROCHLORIDE 10 MG/1
10 TABLET ORAL DAILY
Status: DISCONTINUED | OUTPATIENT
Start: 2023-03-07 | End: 2023-03-09 | Stop reason: HOSPADM

## 2023-03-07 RX ORDER — LANOLIN ALCOHOL/MO/W.PET/CERES
3 CREAM (GRAM) TOPICAL NIGHTLY
Status: DISCONTINUED | OUTPATIENT
Start: 2023-03-07 | End: 2023-03-09 | Stop reason: HOSPADM

## 2023-03-07 RX ORDER — FLUTICASONE PROPIONATE 50 MCG
2 SPRAY, SUSPENSION (ML) NASAL DAILY
Status: DISCONTINUED | OUTPATIENT
Start: 2023-03-07 | End: 2023-03-09 | Stop reason: HOSPADM

## 2023-03-07 RX ORDER — NICOTINE 21 MG/24HR
1 PATCH, TRANSDERMAL 24 HOURS TRANSDERMAL DAILY
Status: DISCONTINUED | OUTPATIENT
Start: 2023-03-07 | End: 2023-03-09 | Stop reason: HOSPADM

## 2023-03-07 RX ADMIN — CETIRIZINE HYDROCHLORIDE 10 MG: 10 TABLET, FILM COATED ORAL at 10:55

## 2023-03-07 RX ADMIN — AZELASTINE 1 SPRAY: 1 SPRAY, METERED NASAL at 21:30

## 2023-03-07 RX ADMIN — CLARITHROMYCIN 250 MG: 250 TABLET ORAL at 10:55

## 2023-03-07 RX ADMIN — MULTIPLE VITAMINS W/ MINERALS TAB 1 TABLET: TAB at 10:55

## 2023-03-07 RX ADMIN — ACETAMINOPHEN 650 MG: 325 TABLET ORAL at 17:16

## 2023-03-07 RX ADMIN — OXCARBAZEPINE 150 MG: 150 TABLET, FILM COATED ORAL at 21:32

## 2023-03-07 RX ADMIN — LISINOPRIL 10 MG: 20 TABLET ORAL at 21:31

## 2023-03-07 RX ADMIN — HYDROXYZINE PAMOATE 50 MG: 50 CAPSULE ORAL at 10:57

## 2023-03-07 RX ADMIN — RISPERIDONE 0.5 MG: 0.5 TABLET, ORALLY DISINTEGRATING ORAL at 21:32

## 2023-03-07 RX ADMIN — LEVOTHYROXINE SODIUM 100 MCG: 0.1 TABLET ORAL at 10:55

## 2023-03-07 RX ADMIN — NORETHINDRONE ACETATE AND ETHINYL ESTRADIOL 1 TABLET: 1; .02 TABLET ORAL at 15:37

## 2023-03-07 RX ADMIN — ARFORMOTEROL TARTRATE 15 MCG: 15 SOLUTION RESPIRATORY (INHALATION) at 21:00

## 2023-03-07 RX ADMIN — BUDESONIDE 500 MCG: 0.5 SUSPENSION RESPIRATORY (INHALATION) at 21:00

## 2023-03-07 RX ADMIN — Medication 1 TABLET: at 15:36

## 2023-03-07 RX ADMIN — FLUTICASONE PROPIONATE 2 SPRAY: 50 SPRAY, METERED NASAL at 13:19

## 2023-03-07 RX ADMIN — OXCARBAZEPINE 150 MG: 150 TABLET, FILM COATED ORAL at 13:42

## 2023-03-07 RX ADMIN — MONTELUKAST 10 MG: 10 TABLET, FILM COATED ORAL at 21:30

## 2023-03-07 RX ADMIN — MELATONIN 3 MG ORAL TABLET 3 MG: 3 TABLET ORAL at 21:31

## 2023-03-07 ASSESSMENT — SLEEP AND FATIGUE QUESTIONNAIRES
DO YOU HAVE DIFFICULTY SLEEPING: NO
AVERAGE NUMBER OF SLEEP HOURS: 9
AVERAGE NUMBER OF SLEEP HOURS: 9
DO YOU HAVE DIFFICULTY SLEEPING: NO
DO YOU USE A SLEEP AID: NO
DO YOU USE A SLEEP AID: NO

## 2023-03-07 ASSESSMENT — PAIN SCALES - GENERAL
PAINLEVEL_OUTOF10: 5
PAINLEVEL_OUTOF10: 0
PAINLEVEL_OUTOF10: 6

## 2023-03-07 ASSESSMENT — PAIN DESCRIPTION - FREQUENCY: FREQUENCY: INTERMITTENT

## 2023-03-07 ASSESSMENT — PAIN DESCRIPTION - ORIENTATION: ORIENTATION: MID;ANTERIOR

## 2023-03-07 ASSESSMENT — PAIN DESCRIPTION - ONSET: ONSET: GRADUAL

## 2023-03-07 ASSESSMENT — PAIN - FUNCTIONAL ASSESSMENT: PAIN_FUNCTIONAL_ASSESSMENT: ACTIVITIES ARE NOT PREVENTED

## 2023-03-07 ASSESSMENT — LIFESTYLE VARIABLES
HOW OFTEN DO YOU HAVE A DRINK CONTAINING ALCOHOL: NEVER
HOW MANY STANDARD DRINKS CONTAINING ALCOHOL DO YOU HAVE ON A TYPICAL DAY: PATIENT DOES NOT DRINK
HOW OFTEN DO YOU HAVE A DRINK CONTAINING ALCOHOL: NEVER
HOW MANY STANDARD DRINKS CONTAINING ALCOHOL DO YOU HAVE ON A TYPICAL DAY: PATIENT DOES NOT DRINK

## 2023-03-07 ASSESSMENT — PATIENT HEALTH QUESTIONNAIRE - PHQ9
SUM OF ALL RESPONSES TO PHQ QUESTIONS 1-9: 0
SUM OF ALL RESPONSES TO PHQ QUESTIONS 1-9: 6

## 2023-03-07 ASSESSMENT — PAIN DESCRIPTION - LOCATION
LOCATION: HEAD
LOCATION: HEAD

## 2023-03-07 ASSESSMENT — PAIN DESCRIPTION - DESCRIPTORS: DESCRIPTORS: ACHING

## 2023-03-07 ASSESSMENT — PAIN DESCRIPTION - PAIN TYPE: TYPE: ACUTE PAIN

## 2023-03-07 NOTE — GROUP NOTE
Group Therapy Note    Date: 3/7/2023    Group Start Time: 1000  Group End Time: 3826  Group Topic: Psychoeducation    [facility] ACUTE [facility] 1    [place], [place]                                                                  Date: 3/7/2023    Type of Group: Psychoeducation    Wellness Binder Information  Module Name:  managing a new dx    Patient's Goal:  Patient will be able to communicate needs with their own support. Notes:  Pleasant and sharing when prompted. Status After Intervention:  Improved    Participation Level:  Active Listener and Interactive    Participation Quality: Appropriate, Attentive, and Sharing      Speech:  normal      Thought Process/Content: Logical      Affective Functioning: Congruent      Mood: euthymic      Level of consciousness:  Alert, Oriented x4, and Attentive      Response to Learning: Able to verbalize/acknowledge new learning, Able to retain information, and Progressing to goal      Endings: None Reported    Modes of Intervention: Education, Support, and Socialization      Discipline Responsible: Psychoeducational Specialist      Signature:  Aarti Franco

## 2023-03-07 NOTE — TELEPHONE ENCOUNTER
----- Message from Guera Santos sent at 3/7/2023 12:33 PM EST -----  Subject: Message to Provider    QUESTIONS  Information for Provider? Patient was admitted to [facility]   on 3/6 to psychiatric behavior. She is being seen for depression and   suicidal thoughts. You can call Taz from [employer] for more information  ---------------------------------------------------------------------------  --------------  [address]  [phone]; OK to leave message on voicemail  ---------------------------------------------------------------------------  --------------  SCRIPT ANSWERS  Relationship to Patient? Third Party  Third Party Type? Insurance? Representative Name?  Marti

## 2023-03-07 NOTE — BH NOTE
[facility][1]  Admission Note     Patient admitted to 7 under the care of Dr. Brii Gross from [facility] on an involuntary hold for Major Depressive Disorder . Patient presented to ED with SI with a plan to jump off a bridge in [place] d/t multiple stressors such as pending homelessness and financial stressors. . Patient currently denies SI/HI/AVH. Patient perseverates on need to be discharged so patient can return home to [facility] as patient needs to be out of apartment by March 31, 2023. Patient reports anxiety and depression, denies pain. Patient denies hx of SI/HI/AVH, inpatient admissions, current treatment with outpatient counseling. Patient reports psychiatric medications prescribed by PCP. Patient appears to minimize situation, thought blocking, delayed responses. Patient appears flat, withdrawn, and guarded. Patient displays fair eye contact, poor judgment, and poor insight. Patient denies hx of ETOH use, substance abuse/dependence, and tobacco use. Patient provided with food and fluids. Patient declined HS melatonin. Patient given informational folder, oriented to unit and room, and consents signed. Q 15 minute purposeful rounds initiated and maintained. No c/o or concerns verbalized at this time. Will continue to observe and report. Admission Type:   Admission Type: Involuntary    Reason for admission:  Reason for Admission: \"I was having suidical thoughts and was going to jump off a bridge but that has subsided now. \"      Addictive Behavior:   Addictive Behavior  In the Past 3 Months, Have You Felt or Has Someone Told You That You Have a Problem With  : None    Medical Problems:   Past Medical History:   Diagnosis Date    Anxiety     Common variable immunodeficiency ([facility] [address]     FU with Dr. Oneil Artis, no recent issues, stable     Dysmenorrhea     Environmental allergies     Hypertension     Kidney stone     Thyroid disease        Status EXAM:  Mental Status and Behavioral Exam  Normal: No  Level of Assistance: Independent/Self  Facial Expression: Flat  Affect: Constricted  Level of Consciousness: Alert  Frequency of Checks: 4 times per hour, close  Mood:Normal: No  Mood: Anxious, Suspicious  Motor Activity:Normal: Yes  Eye Contact: Fair  Observed Behavior: Withdrawn, Cooperative, Guarded  Sexual Misconduct History: Current - no  Preception: Winifred to person, Winifred to time, Winifred to place  Attention:Normal: No  Attention: Distractible  Thought Processes: Perseveration  Thought Content:Normal: No  Thought Content: Obsessions, Compulsions, Other (comment)  Depression Symptoms: Impaired concentration, Change in energy level, Loss of interest  Anxiety Symptoms: Generalized  Nisha Symptoms: Poor judgment  Hallucinations: None  Delusions: No  Memory:Normal: Yes  Insight and Judgment: No  Insight and Judgment: Poor judgment, Poor insight    Tobacco Screening:  Practical Counseling, on admission, dean X, if applicable and completed (first 3 are required if patient doesn't refuse):            ( ) Recognizing danger situations (included triggers and roadblocks)                    ( ) Coping skills (new ways to manage stress,relaxation techniques, changing routine, distraction)                                                           ( ) Basic information about quitting (benefits of quitting, techniques in how to quit, available resources  ( ) Referral for counseling faxed to [facility]                                                                                                                   ( ) Patient refused counseling  (X) Patient has not smoked in the last 30 days    Metabolic Screening:    Lab Results   Component Value Date    LABA1C 5.3 11/15/2022       Lab Results   Component Value Date    CHOL 185 11/15/2022    CHOL 155 04/19/2022    CHOL 174 10/12/2021    CHOL 176 04/09/2021     Lab Results   Component Value Date    TRIG 85 11/15/2022    TRIG 94 04/19/2022    TRIG 71 10/12/2021    TRIG 46 04/09/2021     Lab Results   Component Value Date    HDL 93 11/15/2022    HDL 78 04/19/2022    HDL 84 10/12/2021     04/09/2021     No components found for: LDLCAL  Lab Results   Component Value Date    LABVLDL 17 11/15/2022    LABVLDL 19 04/19/2022    LABVLDL 14 10/12/2021    LABVLDL 9 04/09/2021         Body mass index is 25.21 kg/m².     BP Readings from Last 2 Encounters:   03/07/23 (!) 168/93   12/08/22 (!) 141/99           Pt admitted with followings belongings:  Dental Appliances: None  Vision - Corrective Lenses: None  Hearing Aid: None  Jewelry: None  Body Piercings Removed: N/A  Clothing: Undergarments, Footwear, Socks, Pants, Shirt, Jacket/Coat (1 pr black shoes/strings, 1 hoodie/strings, 1 jacket w/paul, 1 pr pants/strings, 1 shirt, 1 pr socks, 1 pr panty)  Other Valuables: Purse, Keys, Wallet, Personal Toiletries, Other (Comment) (Purse, wallet ($49-2x$20, 1x$5, 4x$1), multiple debit/credit cards, insurance cards, SS card, Julio DL, personal toiletries)    Jadon Dumas RN

## 2023-03-07 NOTE — PROGRESS NOTES
Home medications unable to be verified. Per patient, prescriptions are received through [facility]. Will update oncoming shift.

## 2023-03-07 NOTE — ED NOTES
Covid test resulted and is negative. Admission orders enter per previous telephone conversation with Dr. Nic Day.  Adam Elise notified of admission. Patient updated. Patient asking how long it will take to get upstairs. Explained to patient that she will go upstairs as soon as possible.       Tyson Willingham RN  03/06/23 0489

## 2023-03-07 NOTE — H&P
Department of Psychiatry  History and Physical - Adult     CHIEF COMPLAINT: \"I am not feeling suicidal anymore. \"    Patient was seen after discussing with the treatment team and reviewing the chart    CIRCUMSTANCES OF ADMISSION: presented to the ED reporting suicidal ideations with a plan to jump off a bridge that she thought of in [place]. HISTORY OF PRESENT ILLNESS:      The patient is a 50 y.o. female with significant past history of autism, common variable immunodeficiency, hypertension patient is on multiple inhalers presented to the ED reporting suicidal ideations with a plan to jump off a bridge that she thought of in [place]. In the ED her urine drug screen was negative her blood alcohol level is negative she was medically cleared mid to  S. adult psychiatric unit for further psychiatric assessment stabilization and treatment    Patient today patient states her biggest stressor is that she has been living with her brother in an apartment for 2 years they share the rent. To move to [place] the end of this month and she needs to find her own place as of the end of March. She states that due to [place] and inflation she is having a hard time making ends meet due to her limited income. She only works 2 part-time jobs and is also security disability. She states that her mother had told her that she was going to buy her at [address] [place] [address] with a trust fund that was set up for her but her mother now is saying that she cannot afford to do that either. She states that her and her brother were trying to apply online for her house in [facility] but that they were having trouble with the website and that so she became frustrated was crying and told her brother that she was suicidal and he called 911 who brought patient to the hospital.  Patient now saying that she is no longer suicidal.  She states that she does not belong here because she has autism and this is not a good place for her.   She is minimizing the circumstance of hospitalization she shows limited insight and judgment. She denies any history of any manic episodes denies periods of anger impulsivity or agitation denies going days without sleeping more talkative hyperverbal with rapid pressured speech flight of ideas. She denies any psychotic symptoms denies hearing voices or seeing things nobody else hears or sees denies any paranoia or any delusions. She denies feeling helpless worthless or guilty. .  Patient does seem to have some impulsivity she is clearly minimizing the circumstance of hospitalization need for treatment per collateral formation received and patient's brother patient was trying to arrange an Dietra Clos to go to the [facility] in [place] where she wanted to commit suicide. Patient is reportedly been telling her brother that she does not want to be here and trying to manipulate to get out of the psychiatric unit. Per brother patient also has periods of anger and outbursts and \"talks wildly. \"  Patient clearly has issues with impulsivity she seems to be easily agitated. Insight judgment is poor she is alert oriented time place and person    Past psychiatric history: Patient denies any history of any inpatient psychiatric hospitalizations however per confirmation from brother patient was inpatient hospitalized when she was younger denies ever seeing psychiatrist is currently prescribed Prozac 20 mg 3 times daily by.   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 States her brother is moving back to [place] and that she needs to find her own place to live. Has never been  has no children denies any history of physical sexual social abuse while growing up denies access to any guns    Past Medical History:        Diagnosis Date    Anxiety     Common variable immunodeficiency ([facility] [address]     FU with Dr. Akhil Lilly, no recent issues, stable     Dysmenorrhea     Environmental allergies     Hypertension     Kidney stone     Thyroid disease        Medications Prior to Admission:   Medications Prior to Admission: clarithromycin (BIAXIN) 250 MG tablet, Take 250 mg by mouth daily Indications: Common Variable Immunodeficiency  cetirizine (ZYRTEC) 10 MG tablet, Take 10 mg by mouth daily  fluticasone (FLONASE) 50 MCG/ACT nasal spray, instill 1 to 2 sprays into each nostril every morning  SYMBICORT 160-4.5 MCG/ACT AERO, Inhale 2 puffs into the lungs 2 times daily  budesonide-formoterol (SYMBICORT) 160-4.5 MCG/ACT AERO, inhale 1 to 2 puffs by mouth every 12 hours Rinse mouth after use  lisinopril (PRINIVIL;ZESTRIL) 10 MG tablet, Take 1 tablet by mouth daily  levothyroxine (SYNTHROID) 100 MCG tablet, TAKE 1 TABLET DAILY  FLUoxetine (PROZAC) 20 MG capsule, TAKE 1 CAPSULE THREE TIMES A DAY  norethindrone-ethinyl estradiol (RAMILA 1/20) 1-20 MG-MCG per tablet, Take one tablet by mouth daily  montelukast (SINGULAIR) 10 MG tablet, take 1 tablet by mouth at bedtime  triamcinolone (ARISTOCORT) 0.5 % cream, Apply topically 3 times daily. (Patient not taking: Reported on 3/7/2023)  [DISCONTINUED] ibuprofen (ADVIL;MOTRIN) 600 MG tablet, TAKE 1 TABLET BY MOUTH THREE TIMES DAILY WITH FOOD AS NEEDED  clobetasol (TEMOVATE) 0.05 % ointment, Apply topically 2 times daily. (Patient not taking: Reported on 3/7/2023)  lidocaine (XYLOCAINE) 5 % ointment, Apply topically as needed.  (Patient not taking: Reported on 3/7/2023)  Turmeric Curcumin 500 MG CAPS, Take by mouth  Multiple Vitamins-Minerals (ONE-A-DAY WOMENS PO), Take by mouth  Probiotic Product (PROBIOTIC DAILY PO), Take by mouth LD 4/6/16  Omega-3 Fatty Acids (FISH OIL PO), Take by mouth LD 4/6/16  azelastine (ASTELIN) 137 MCG/SPRAY nasal spray, 1 spray by Nasal route 2 times daily Use in each nostril as directed    Past Surgical History:        Procedure Laterality Date    COLONOSCOPY  04/11/2016    LITHOTRIPSY  2019    UPPER GASTROINTESTINAL ENDOSCOPY  04/11/2016    WISDOM TOOTH EXTRACTION         Allergies:   Prednisone    Family History  History reviewed. No pertinent family history. EXAMINATION:    REVIEW OF SYSTEMS:    ROS:  [x] All negative/unchanged except if checked.  Explain positive(checked items) below:  [] Constitutional  [] Eyes  [] Ear/Nose/Mouth/Throat  [] Respiratory  [] CV  [] GI  []   [] Musculoskeletal  [] Skin/Breast  [] Neurological  [] Endocrine  [] Heme/Lymph  [] Allergic/Immunologic    Explanation:     Vitals:  BP (!) 132/99   Pulse (!) 107   Temp 97 °F (36.1 °C) (Temporal)   Resp 18   Ht 5' 3.5\" (1.613 m)   Wt 144 lb 9.6 oz (65.6 kg)   LMP  (LMP Unknown)   SpO2 98%   BMI 25.21 kg/m²      Physical Examination:   Head: x  Atraumatic: x normocephalic  Skin and Mucosa        Moist x  Dry   Pale  x Normal   Neck:  Thyroid  Palpable   x  Not palpable   venus distention   adenopathy   Chest: x Clear   Rhonchi     Wheezing   CV:  xS1   xS2    xNo murmer   Abdomen:  x  Soft    Tender    Viceromegaly   Extremities:  x No Edema     Edema     Cranial Nerves Examination:   CN II:   xPupils are reactive to light  Pupils are non reactive to light  CN III, IV, VI:  xNo eye deviation    No diplopia or ptosis   CN V:    xFacial Sensation is intact     Facial Sensation is not intact   CN IIIV:   x Hearing is normal to rubbing fingers   CN IX, X:     xNormal gag reflex and phonation   CN XI:   xShoulder shrug and neck rotation is normal  CNXII:    xTongue is midline no deviation or atrophy    Mental Status Examination:    Level of consciousness:  within normal limits   Appearance:  fair grooming and fair hygiene  Behavior/Motor:  no abnormalities noted  Attitude toward examiner:  cooperative  Speech:  spontaneous, normal rate and normal volume   Mood: \" I am okay. \"  Affect: Anxious appropriate  Thought processes: Linear thought flight of ideas loose associations  Thought content: Devoid of any auditory visual hallucinations delusions or other perceptual normalities. Denies SI/HI intent or plan   Language: able to name objects and repeate phrases  Remote Memory: intact  Recent Memory: intact  Cognition:  oriented to person, place, and time   Fund of Knowledge: Vocabulary intact, pt is aware of current events and past history  Attetion and Concentration intact  Insight poor  Judgement poor             DIAGNOSIS:  Autistic disorder          LABS: REVIEWED TODAY:  Recent Labs     03/06/23  1252   WBC 8.8   HGB 14.8        Recent Labs     03/06/23  1252      K 3.9      CO2 22   BUN 16   CREATININE 0.7   GLUCOSE 120*     Recent Labs     03/06/23  1252   BILITOT 0.3   ALKPHOS 58   AST 22   ALT 19     Lab Results   Component Value Date/Time    LABAMPH NOT DETECTED 03/06/2023 12:52 PM    BARBSCNU NOT DETECTED 03/06/2023 12:52 PM    LABBENZ NOT DETECTED 03/06/2023 12:52 PM    LABMETH NOT DETECTED 03/06/2023 12:52 PM    OPIATESCREENURINE NOT DETECTED 03/06/2023 12:52 PM    PHENCYCLIDINESCREENURINE NOT DETECTED 03/06/2023 12:52 PM    ETOH <10 03/06/2023 12:52 PM     Lab Results   Component Value Date/Time    TSH 3.250 11/15/2022 09:40 AM     No results found for: LITHIUM  No results found for: VALPROATE, CBMZ  No results found for: LITHIUM, VALPROATE      Radiology No results found. TREATMENT PLAN:    Risk Management: Based on the diagnosis and assessment biopsychosocial treatment model was presented to the patient and was given the opportunity to ask any question.   The patient was agreeable to the plan and all the patient's questions were answered to the patient's satisfaction. I discussed with the patient the risk, benefit, alternative and common side effects for the proposed medication treatment. The patient is consenting to this treatment. Collateral Information:  Will obtain collateral information from the family or friends. Will obtain medical records as appropriate from out patient providers  Will consult the hospitalist for a physical exam to rule out any co-morbid physical condition. Home medication Reconciled       New Medications started during this admission :        Prn Haldol 5mg and Vistaril 50mg q6hr for extreme agitation. Trazodone as ordered for insomnia  Vistaril as ordered for anxiety      Psychotherapy:   Encourage participation in milieu and group therapy  Individual therapy as needed        Patient's diagnosis, treatment plan, medication management was formulated at the end of evaluation and after reviewing relevant documentation. Patient was seen directly by myself and Dr. Tra Treviño    Start Trileptal 150 mg twice daily  Start Risperdal 0.5 mg at bedtime to help with mood and impulsivity related to the autism spectrum disorder    Can discontinue constant observer.   Patient is deemed to be moderate risk for suicide based on productive risk factors as well as risk mitigation    Risk Factors:             Needs stable housing                                      Financial difficulties                                      Autism diagnosis                                      Hx of trauma     Protective Factors:   No hx of attempts                                      No hx of admits                                      Has supportive family                                      Initiated ED visit                                      Good communication skills      Behavioral Services  Medicare Certification Upon Admission    I certify that this patient's inpatient psychiatric hospital admission is medically necessary for:    [x] (1) Treatment which could reasonably be expected to improve this patient's condition,       [ ] (2) Or for diagnostic study;     AND     [x](2) The inpatient psychiatric services are provided while the individual is under the care of a physician and are included in the individualized plan of care.     Estimated length of stay/service 3 to 7 days based on stability    Plan for post-hospital care outpatient psychiatric and counseling services      Electronically signed by BESSY Gardner CNP on 9/2/7022 at 1:07 PM

## 2023-03-07 NOTE — PLAN OF CARE
[facility]  Initial Interdisciplinary Treatment Plan NOTE    Review Date & Time:  3/7/23 1000    Patient was not in treatment team    Admission Type:   Admission Type: Involuntary    Reason for admission:  Reason for Admission: \"I was having suidical thoughts and was going to jump off a bridge but that has subsided now. \"      Estimated Length of Stay Update:   5 DAYS  Estimated Discharge Date Update:  FRIDAY    EDUCATION:   Learner Progress Toward Treatment Goals: Reviewed results and recommendations of this team    Method: Individual    Outcome: Needs reinforcement    PATIENT GOALS: \"GET MY MEDS\"    PLAN/TREATMENT RECOMMENDATIONS UPDATE: INITIATE MEDICATIONS, SUPPORTIVE CARE, GROUPS AS TOLERATED, ASSESS FOR SUICIDE RISK AND OBTAIN COLLATERAL INFORMATION, DISCHARGE PLANNING WITH FOLLOW UP    GOALS UPDATE:   Time frame for Short-Term Goals:  5 DAYS    Saranya Aly RN

## 2023-03-07 NOTE — ED NOTES
Anju Culver stated pateint should be presented to psychiatrist for admission. Reviewed chart and noted that Covid/Influenza test was not completed.  notified this will need to be completed before patient can be presented.       Marielle Fernandez RN  03/06/23 2025

## 2023-03-07 NOTE — CARE COORDINATION
ERIK met with pt to discuss follow up appointments. Pt reported that she would like to go to [facility] for follow up services. [facility]. SW left a voicemail requesting a call back.      [facility] [place]   Location: [address]. Kimberli Valenzuela.  Phone: [phone]  Fax number: [phone]

## 2023-03-07 NOTE — ED NOTES
This RN spoke to Dr. Wali White who states patient can be accepted to [address] [place] [address] once Covid test is resulted and if test is negative.  [facility] notified admission orders will be entered once test is resulted.       Luanne Rodriguez RN  03/06/23 8333

## 2023-03-07 NOTE — PLAN OF CARE
Problem: Anxiety  Goal: Will report anxiety at manageable levels  Description: INTERVENTIONS:  1. Administer medication as ordered  2. Teach and rehearse alternative coping skills  3. Provide emotional support with 1:1 interaction with staff  Outcome: Progressing  Flowsheets (Taken 3/7/2023 0151)  Will report anxiety at manageable levels:   Provide emotional support with 1:1 interaction with staff   Administer medication as ordered     Problem: Coping  Goal: Pt/Family able to verbalize concerns and demonstrate effective coping strategies  Description: INTERVENTIONS:  1. Assist patient/family to identify coping skills, available support systems and cultural and spiritual values  2. Provide emotional support, including active listening and acknowledgement of concerns of patient and caregivers  3. Reduce environmental stimuli, as able  4. Instruct patient/family in relaxation techniques, as appropriate  5. Assess for spiritual pain/suffering and initiate Spiritual Care, Psychosocial Clinical Specialist consults as needed  Outcome: Progressing  Flowsheets (Taken 3/7/2023 0151)  Patient/family able to verbalize anxieties, fears, and concerns, and demonstrate effective coping:   Assist patient/family to identify coping skills, available support systems and cultural and spiritual values   Reduce environmental stimuli, as able   Instruct patient/family in relaxation techniques, as appropriate     Problem: Decision Making  Goal: Pt/Family able to effectively weigh alternatives and participate in decision making related to treatment and care  Description: INTERVENTIONS:  1. Determine when there are differences between patient's view, family's view, and healthcare provider's view of condition  2. Facilitate patient and family articulation of goals for care  3. Help patient and family identify pros/cons of alternative solutions  4. Provide information as requested by patient/family  5.  Respect patient/family right to receive or not to receive information  6. Serve as a liaison between patient and family and health care team  7. Initiate Consults from Ethics, Palliative Care or initiate [facility] as is appropriate  Outcome: Progressing     Problem: Depression/Self Harm  Goal: Effect of psychiatric condition will be minimized and patient will be protected from self harm  Description: INTERVENTIONS:  1. Assess impact of patient's symptoms on level of functioning, self care needs and offer support as indicated  2. Assess patient/family knowledge of depression, impact on illness and need for teaching  3. Provide emotional support, presence and reassurance  4. Assess for possible suicidal thoughts or ideation. If patient expresses suicidal thoughts or statements do not leave alone, initiate Suicide Precautions, move to a room close to the nursing station and obtain sitter  5. Initiate consults as appropriate with Mental Health Professional, Spiritual Care, Psychosocial CNS, and consider a recommendation to the LIP for a Psychiatric Consultation  Outcome: Progressing  Flowsheets (Taken 3/7/2023 0151)  Effect of psychiatric condition will be minimized and patient will be protected from self harm:   Assess impact of patients symptoms on level of functioning, self care needs and offer support as indicated   Provide emotional support, presence and reassurance   Assess for suicidal thoughts or ideation. If patient expresses suicidal thoughts or statements do not leave alone, initiate Suicide Precautions, move near nurse station, obtain sitter     Problem: Involuntary Admit  Goal: Will cooperate with staff recommendations and doctor's orders and will demonstrate appropriate behavior  Description: INTERVENTIONS:  1. Treat underlying conditions and offer medication as ordered  2. Educate regarding involuntary admission procedures and rules  3.  Contain excessive/inappropriate behavior per unit and hospital policies  Outcome: Progressing  Flowsheets (Taken 3/7/2023 0151)  Will cooperate with staff recommendations and doctor's orders and will demonstrate appropriate behavior:   Treat underlying conditions and offer medication as ordered   Educate regarding involuntary admission procedures and rules   Contain excessive/inappropriate behavior per unit and hospital policies     Problem: Self Harm/Suicidality  Goal: Will have no self-injury during hospital stay  Description: INTERVENTIONS:  1. Ensure constant observer at bedside with Q15M safety checks  2. Maintain a safe environment  3. Secure patient belongings  4. Ensure family/visitors adhere to safety recommendations  5. Ensure safety tray has been added to patient's diet order  6.   Every shift and PRN: Re-assess suicidal risk via Frequent Screener    Outcome: Progressing  Flowsheets (Taken 3/7/2023 0151)  Will have no self-injury during hospital stay:   Maintain a safe environment   Secure patient belongings   Every shift and PRN: Re-assess suicidal risk via Frequent Screener     Problem: Pain  Goal: Verbalizes/displays adequate comfort level or baseline comfort level  Outcome: Progressing     Problem: Risk for Elopement  Goal: Patient will not exit the unit/facility without proper excort  Recent Flowsheet Documentation  Taken 3/7/2023 0151 by Lior Moreland RN  Nursing Interventions for Elopement Risk:   Communicate/escalate to /other team member the risk of elopement   Escort with two staff members if patient must leave the unit   Make sure patient has all necessary personal care items   Shoes and clothing collected and placed in gown attire  Taken 3/7/2023 0136 by Lior Moreland RN  Nursing Interventions for Elopement Risk:   Communicate/escalate to /other team member the risk of elopement   Escort with two staff members if patient must leave the unit   Make sure patient has all necessary personal care items   Shoes and clothing collected and placed in ashish vicente

## 2023-03-07 NOTE — PLAN OF CARE
Problem: Anxiety  Goal: Will report anxiety at manageable levels  Description: INTERVENTIONS:  1. Administer medication as ordered  2. Teach and rehearse alternative coping skills  3. Provide emotional support with 1:1 interaction with staff  3/7/2023 1324 by Cathy Mejias RN  Outcome: Progressing  3/7/2023 0202 by Elena Shipman RN  Outcome: Progressing  Flowsheets (Taken 3/7/2023 0151)  Will report anxiety at manageable levels:   Provide emotional support with 1:1 interaction with staff   Administer medication as ordered  MOOD ANXIOUS, BUT IS PLEASANT AND IN GOOD CONTROL. Problem: Coping  Goal: Pt/Family able to verbalize concerns and demonstrate effective coping strategies  Description: INTERVENTIONS:  1. Assist patient/family to identify coping skills, available support systems and cultural and spiritual values  2. Provide emotional support, including active listening and acknowledgement of concerns of patient and caregivers  3. Reduce environmental stimuli, as able  4. Instruct patient/family in relaxation techniques, as appropriate  5. Assess for spiritual pain/suffering and initiate Spiritual Care, Psychosocial Clinical Specialist consults as needed  3/7/2023 1324 by Cathy Mejias RN  Outcome: Progressing  3/7/2023 0202 by Elena Shipman RN  Outcome: Progressing  Flowsheets (Taken 3/7/2023 0151)  Patient/family able to verbalize anxieties, fears, and concerns, and demonstrate effective coping:   Assist patient/family to identify coping skills, available support systems and cultural and spiritual values   Reduce environmental stimuli, as able   Instruct patient/family in relaxation techniques, as appropriate  PT. MAKES NEEDS KNOWN AND REQUESTS SUPPORT WHEN NEEDED. Problem: Decision Making  Goal: Pt/Family able to effectively weigh alternatives and participate in decision making related to treatment and care  Description: INTERVENTIONS:  1.  Determine when there are differences between patient's view, family's view, and healthcare provider's view of condition  2. Facilitate patient and family articulation of goals for care  3. Help patient and family identify pros/cons of alternative solutions  4. Provide information as requested by patient/family  5. Respect patient/family right to receive or not to receive information  6. Serve as a liaison between patient and family and health care team  7. Initiate Consults from Ethics, Palliative Care or initiate [facility] as is appropriate  3/7/2023 1324 by Lilian Sosa RN  Outcome: Progressing  3/7/2023 0202 by Waqas Newberry RN  Outcome: Progressing  PT. SEEKS FREQUENT REASSURANCE AND ACCEPTS SAME. Problem: Depression/Self Harm  Goal: Effect of psychiatric condition will be minimized and patient will be protected from self harm  Description: INTERVENTIONS:  1. Assess impact of patient's symptoms on level of functioning, self care needs and offer support as indicated  2. Assess patient/family knowledge of depression, impact on illness and need for teaching  3. Provide emotional support, presence and reassurance  4. Assess for possible suicidal thoughts or ideation. If patient expresses suicidal thoughts or statements do not leave alone, initiate Suicide Precautions, move to a room close to the nursing station and obtain sitter  5. Initiate consults as appropriate with Mental Health Professional, Spiritual Care, Psychosocial CNS, and consider a recommendation to the LIP for a Psychiatric Consultation  3/7/2023 1324 by Lilian Sosa RN  Outcome: Progressing  3/7/2023 0202 by Waqas Newberry RN  Outcome: Progressing  Flowsheets (Taken 3/7/2023 0151)  Effect of psychiatric condition will be minimized and patient will be protected from self harm:   Assess impact of patients symptoms on level of functioning, self care needs and offer support as indicated   Provide emotional support, presence and reassurance   Assess for suicidal thoughts or ideation.  If patient expresses suicidal thoughts or statements do not leave alone, initiate Suicide Precautions, move near nurse station, obtain sitter  PT. DENIED SUICIDAL IDEATIONS, SELF HARM THOUGHTS, THOUGHTS TO HARM OTHERS AND HALLUCINATIONS. PT. VOICED NO DELUSIONS, BUT HAS SOMATIC FOCUS. Problem: Involuntary Admit  Goal: Will cooperate with staff recommendations and doctor's orders and will demonstrate appropriate behavior  Description: INTERVENTIONS:  1. Treat underlying conditions and offer medication as ordered  2. Educate regarding involuntary admission procedures and rules  3. Contain excessive/inappropriate behavior per unit and hospital policies  6/1/1407 3077 by Nickie Moya RN  Outcome: Progressing  3/7/2023 0202 by Lior Moreland RN  Outcome: Progressing  Flowsheets (Taken 3/7/2023 0151)  Will cooperate with staff recommendations and doctor's orders and will demonstrate appropriate behavior:   Treat underlying conditions and offer medication as ordered   Educate regarding involuntary admission procedures and rules   Contain excessive/inappropriate behavior per unit and hospital policies  PT. REMAINS ON AN INVOLUNTARY STATUS. Problem: Self Harm/Suicidality  Goal: Will have no self-injury during hospital stay  Description: INTERVENTIONS:  1. Ensure constant observer at bedside with Q15M safety checks  2. Maintain a safe environment  3. Secure patient belongings  4. Ensure family/visitors adhere to safety recommendations  5. Ensure safety tray has been added to patient's diet order  6.   Every shift and PRN: Re-assess suicidal risk via Frequent Screener  3/7/2023 1324 by Nickie Moya RN  Outcome: Progressing  3/7/2023 0202 by Lior Moreland RN  Outcome: Progressing  Flowsheets (Taken 3/7/2023 0151)  Will have no self-injury during hospital stay:   Maintain a safe environment   Secure patient belongings   Every shift and PRN: Re-assess suicidal risk via Frequent Screener  NO SELF HARM BEHAVIORS REPORTED OR OBSERVED. PT. DENIED SELF HARM THOUGHTS ON A.M. ASSESSMENT.

## 2023-03-07 NOTE — PROGRESS NOTES
Patient participated in afternoon peer recovery group.   Patient appeared to be actively listening.  Patient was 1 of 12 in attendance.   Willing to identify an affirmation to share.

## 2023-03-07 NOTE — CARE COORDINATION
Collateral Contact (BETSY signed)  Name: Farooq Duckworth  Relationship: Mom                         Brother  Number: [phone]     Collateral Information: SW called pt's brother to obtain collateral information and discuss discharge planning. SW spoke with brother who stated that the pt has depression and anxiety and high functioning autism. Brother stated that the pt struggles with her emotions and refuses to see a counselor about how she is feeling. Brother stated that pt is on Prozac and refused to change her medications. Brother stated that he has lived with pt in an apartment for the last 2 years and mom was going to get the pt a condo, brother stated that the prices went up after COVID and mom was no longer able to afford the apartment. Brother stated that he is going to be moving and pt will not be able to afford the apartment on her own and she does not want to go to an apartment that is \"less than\" the one she is living in now. Brother stated that they always grew up poor and pt feels that going to a different apartment is a step back from where she is now. Brother stated that the pt was very upset and made threats and \"talks wildly\" and he feels that this is more of anger based outbursts rather than depression. Brother stated that pt called the front office yesterday stating that she wants to renew the lease and they informed the brother of this and brother will not be renewing the lease. Brother stated that the lease is up on 4/1. Brother reported that if pt does not have an apartment at this time she will be able to move back in with mom and she wont be homeless. Brother informed the pt  that the circumstances are not changing/ he is still moving and pt was making comments about harming herself and he called the police and she was brought to the hospital.Pt does not understand that actions have consequences.  Pt has been frustrated due to her admission and does not like being out of her normal routine due to her autism. The pt has been refusing to get another apartment. Pt's mom will be coming for visitation and mom is bringing her medications. Brother stated that pt has not been admitted for her mental health in a long time but it has happened in the pt's younger years. Brother does not think she was going to follow through with ending her life but he feels that the pt has ideations about doing it. Brother stated that pt was trying to arrange transportation (like an uber)  to get to the bridge in [facility] and he stated that this was her plan to end her life. Brother stated that the pt is upset with mother due to mom stating in the past she would get the pt a condo and now she is not able to afford it. Pt told brother to call mom and tell her that she is going to the hospital so mom can understand that \"she caused this to happen. \" Brother does not think that the pt is a threat to herself. Brother stated that the pt's support system is limited and she will take advantage of people/ manipulate them to get what she wants. Brother stated that the pt will be able to stay with her mom if she does not find a new apartment to live in. Brother stated that pt is just upset and the pt does not like to adapt to new changes (part of her autism). Pt is able to return back home to apartment, brother or mom will be able to  when discharged. Brother denied access to [employer] pt does have a taser for her own safety.        Access to Weapons per Collateral Contact: [] Reports [x] Denies

## 2023-03-07 NOTE — ED NOTES
[facility]       Pal Gabriel RN  03/06/23 2000

## 2023-03-07 NOTE — ED NOTES
Dr. Andrey Sin notified of patient's c/o headache, verbal order received.       Fernandez Rivera RN  03/06/23 2275

## 2023-03-07 NOTE — ED NOTES
Patient in hallway on telephone complaining that transport is taking too long to take her upstairs and that no one has told her anything. It has been explained to patient multiple times by different staff members that transporters must transport patients in the order that they were assigned.       Pal Gabriel RN  03/06/23 1535

## 2023-03-07 NOTE — CARE COORDINATION
Biopsychosocial Assessment Note    Social work met with patient to complete the biopsychosocial assessment and C-SSRS. Chief Complaint: pt reported that she is currently in the hospital because she was \"overwhelmed, I need to move by march 31st and I still need to find a place to live. \" Per CLAUDIA SW note \"The pt is a 50 yr old white female who presents to the ED for SI and a plan to jump off a bridge. \"    Mental Status Exam: Pt is alert and oriented x4. Pt's mood is anxious, affect is constricted. Pt's speech is clear, rate is fast and volume is normal. Pt's eye contact is fair. Pt's thoughts process is logical, thought content is preoccupied and has some obsessions. Pt's memory is intact, pt is a good historian. Pt's insight and judgement is fair. Pt was cooperative and friendly during assessment and offered good information. Pt denied SI, HI, AVH. Pt was preoccupied with information packet provided when she was admitted and with the visitation policy. Throughout assessment pt kept stating that she needs to talk to someone about the information in this packet because the information is misleading and she is being told different information. Clinical Summary: pt denied any history of inpatient mental health hospitalizations and denied being active with an outpatient mental health provider or being [facility]. Pt stated that her main support system is her brother and mom. Pt stated that she is currently living with her brother but he is going to be moving to [place] soon and she needs to move out of the apartment by the end of the month and find a new place to live. Pt stated that this is her main stressor at this time. Pt reported that she did make comments about wanting to end her life but she did not want to do it. Pt stated that this was the only time that she made comments about ending her life. Pt denied having any intentions of trying to harm herself. Pt denied any history of self harm.  Pt stated that she has not had little interest or pleasure in doing things and denied feeling hopeless or helpless. Pt stated that she started feeling this way when she was admitted to the hospital.     Pt stated that she is currently on SSI and SSDI and gets food stamps. Pt stated that she is working at the [employer] for [employer] part time as an . Pt stated that she has hypothyroidism and an immune deficiency. Pt stated that she is single and has no children. Pt was raised by her mom and dad and has 8 siblings. Pt stated that there is a family history of anxiety and depression. Pt denied any legal history. Pt denied any substance use. Pt reported that she is high functioning autistic and was bullied throughout highschool verbally/ mentally. Pt reported that she was in some special education classes. Pt stated that her sleep and appetite have been normal. Pt denied being spiritual/  Mandaen. Pt denied any history of self injurious behaviors.       Risk Factors: Pt needs to find new housing  Pt is not active with a [place] [address] provider  Pt has an autism diagnosis  Pt has previous trauma  Family history of depression/ anxiety  Pt has housing stressors  Pt has health issues  Gender Risk (female 35-63)    Protective Factors: pt has supportive family  Pt has no history of suicide attempts/ self injurious behaviors  Pt has no inpatient mental health history  Pt has help seeking behaviors  Pt has steady income from work, SSDI, [employer] food assistance  Pt has access to basic needs  Pt has safe/ stable housing    Gender  [] Male [x] Female [] Transgender  [] Other    Sexual Orientation    [x] Heterosexual [] Homosexual [] Bisexual [] Other    Suicidal Ideation  [] Past [] Present [x] Denies     C-SSRS Screening Completed: Current Suicide Risk:  [] No Risk  [] Low [] Moderate [x] High    Homicidal Ideation  [] Past [] Present [x] Denies     Hallucinations/Delusions (Specify type)  [] Reports [x] Denies     Current or Past Mental Health Treatment:  [x] Yes, When and Where: outpatient counseling in the past   [] No    Substance Use/Alcohol Use/Addiction  [] Reports [x] Denies     Tobacco Use (within the last 6 months)  [] Reports [x] Denies     Trauma History  [x] Reports [] Denies     Self Injurious/Self Mutilation Behaviors:   [] Reports:    [] Past [] Present   [x] Denies    Legal History:  []  Yes (Specify)    [x] No    Collateral Contact (BETSY signed)  Name: Cass Matthews  Relationship: Mom   Brother  Number: [phone]  [phone]    Collateral Information:      Access to Weapons per Collateral Contact: [] Reports [] Denies     After consideration of C-SSRS screening results, C-SSRS assessments, and this professional's assessment the patient's overall suicide risk assessed to be:  [] None   [x] Low   [] Moderate   [] High     [x] Discussed current suicide risk, protective and risk factors with RN and NP/Psychiatrist.    Discharge Plan:  [x] Home: with brother, mom or brother to    [] Shelter:  [] Crisis Unit:  [] Substance Abuse Rehab:  [] Nursing Facility:  [] Other (Specify):     Follow up Provider: pt is not active with a [facility] provider

## 2023-03-07 NOTE — ED NOTES
Telephone report called to floor, spoke to AUBREY Red. [facility] removed all patient's belongings from locker for transport. Transport requested.        En Suarez RN  03/06/23 5097

## 2023-03-07 NOTE — ED NOTES
Pt's covid is negative.      [facility] accepted by Dr. Batres Locus called to [facility] in admitting, room 7515    AUBREY Hopkins on [place], [place]  03/06/23 2380

## 2023-03-08 ENCOUNTER — APPOINTMENT (OUTPATIENT)
Dept: CT IMAGING | Age: 53
End: 2023-03-08
Payer: COMMERCIAL

## 2023-03-08 PROBLEM — F60.9 PERSONALITY DISORDER (HCC): Status: ACTIVE | Noted: 2023-03-08

## 2023-03-08 LAB — METER GLUCOSE: 136 MG/DL (ref 74–99)

## 2023-03-08 PROCEDURE — 82962 GLUCOSE BLOOD TEST: CPT

## 2023-03-08 PROCEDURE — 1240000000 HC EMOTIONAL WELLNESS R&B

## 2023-03-08 PROCEDURE — 6370000000 HC RX 637 (ALT 250 FOR IP): Performed by: PSYCHIATRY & NEUROLOGY

## 2023-03-08 PROCEDURE — 6370000000 HC RX 637 (ALT 250 FOR IP): Performed by: NURSE PRACTITIONER

## 2023-03-08 PROCEDURE — 94640 AIRWAY INHALATION TREATMENT: CPT

## 2023-03-08 PROCEDURE — 70450 CT HEAD/BRAIN W/O DYE: CPT

## 2023-03-08 RX ORDER — LACTOBACILLUS ACIDOPHILUS 1B CELL
1 CAPSULE ORAL DAILY
Status: DISCONTINUED | OUTPATIENT
Start: 2023-03-08 | End: 2023-03-09 | Stop reason: HOSPADM

## 2023-03-08 RX ADMIN — CETIRIZINE HYDROCHLORIDE 10 MG: 10 TABLET, FILM COATED ORAL at 09:05

## 2023-03-08 RX ADMIN — FLUTICASONE PROPIONATE 2 SPRAY: 50 SPRAY, METERED NASAL at 09:05

## 2023-03-08 RX ADMIN — MONTELUKAST 10 MG: 10 TABLET, FILM COATED ORAL at 22:35

## 2023-03-08 RX ADMIN — NORETHINDRONE ACETATE AND ETHINYL ESTRADIOL 1 TABLET: 1; .02 TABLET ORAL at 09:04

## 2023-03-08 RX ADMIN — Medication 1 TABLET: at 08:02

## 2023-03-08 RX ADMIN — CLARITHROMYCIN 250 MG: 250 TABLET ORAL at 09:05

## 2023-03-08 RX ADMIN — ACETAMINOPHEN 650 MG: 325 TABLET ORAL at 09:05

## 2023-03-08 RX ADMIN — Medication 1 CAPSULE: at 16:51

## 2023-03-08 RX ADMIN — BUDESONIDE 500 MCG: 0.5 SUSPENSION RESPIRATORY (INHALATION) at 17:45

## 2023-03-08 RX ADMIN — LEVOTHYROXINE SODIUM 100 MCG: 0.1 TABLET ORAL at 06:36

## 2023-03-08 RX ADMIN — AZELASTINE 1 SPRAY: 1 SPRAY, METERED NASAL at 22:35

## 2023-03-08 RX ADMIN — ARFORMOTEROL TARTRATE 15 MCG: 15 SOLUTION RESPIRATORY (INHALATION) at 17:45

## 2023-03-08 ASSESSMENT — PAIN SCALES - GENERAL: PAINLEVEL_OUTOF10: 0

## 2023-03-08 NOTE — BH NOTE
Pt came up to nurses station and asked for Tylenol d/t headache. Pt observed by staff to have eyes roll in back of head, Pt became unsteady and fell to her left side. Staff assisted Pt, Pt eyes open and was conscious, already picking herself up. Ax2 up to chair and assessment performed. Pt aware that she fell and is able to answer questions. Pt states that she doesn't know what happened. Pt reports no pain except for still having a headache, which is \"better now. \"     VS assessed, BP 47/28 HR: 70.   BP with manual cuff: 72/32. Rapid called d/t hypotension and fall. Ax2 back to room to rest in bed. Pt states, \"I feel allot better now. \"     BP while laying down: 105/62 HR: 108     Rapid response team assessed Pt. VS ranged between 88-05 systolic. Pt has no c/o dizziness, able to sit up without loss of consciousness or unsteadyness. Orders given from RRT Dr. Barajas Furnish Lisinopril until further notice. Pt agreed to stay in bed and use call light to make requests of staff, or assistance getting out of bed.

## 2023-03-08 NOTE — PROGRESS NOTES
BEHAVIORAL HEALTH FOLLOW-UP NOTE     3/8/2023     Patient was seen and examined in person, Chart reviewed   Patient's case discussed with staff/team    Chief Complaint: \"I do not get along with my mother. \"    Interim History: I saw patient today in her room. Patient did have a syncopal episode this morning and fell possibly hit her head we did order a CT scan which showed no acute process but does show chronic small vessel changes throughout the white matter and recommended routine MRIs. Patient continues to be discharged focused and she needs to be discharged in order to find an apartment. We talked about the fact that she is allowed to live with her mother in a temporary basis. We also explained the significance of her suicide plan. She does understand that but vehemently denies any suicidal ideations intent or plan indicates that she was just frustrated that point. She states that she will live with her mother at the end of the month t temporarily if she does not find another place to live. She states her hesitancy to stay with her mother is because they do not get along.   She denies suicidal or homicidal ideations intent or plan denies any auditory or visual hallucinations she is eating well sleeping well no neurovegetative signs of depression she is future focused wants to get her own place to stay states she will live with her mom if she needs to        Appetite: [x] Normal/Unchanged  [] Increased  [] Decreased      Sleep:       [x] Normal/Unchanged  [] Fair       [] Poor              Energy:    [x] Normal/Unchanged  [] Increased  [] Decreased        SI [] Present  [x] Absent    HI  []Present  [x] Absent     Aggression:  [] yes  [x] no    Patient is [x] able  [] unable to CONTRACT FOR SAFETY     PAST MEDICAL/PSYCHIATRIC HISTORY:   Past Medical History:   Diagnosis Date    Anxiety     [facility]     FU with Dr. Renita Chow, no recent issues, stable     Dysmenorrhea Environmental allergies     Hypertension     Kidney stone     Thyroid disease        FAMILY/SOCIAL HISTORY:  History reviewed. No pertinent family history. Social History     Socioeconomic History    Marital status: Single     Spouse name: Not on file    Number of children: Not on file    Years of education: Not on file    Highest education level: Not on file   Occupational History    Not on file   Tobacco Use    Smoking status: Never    Smokeless tobacco: Never   Vaping Use    Vaping Use: Never used   Substance and Sexual Activity    Alcohol use: No    Drug use: No    Sexual activity: Not Currently     Partners: Male   Other Topics Concern    Not on file   Social History Narrative    Not on file     Social Determinants of Health     Financial Resource Strain: Not on file   Food Insecurity: Not on file   Transportation Needs: Not on file   Physical Activity: Not on file   Stress: Not on file   Social Connections: Not on file   Intimate Partner Violence: Not on file   Housing Stability: Not on file           ROS:  [x] All negative/unchanged except if checked.  Explain positive(checked items) below:  [] Constitutional  [] Eyes  [] Ear/Nose/Mouth/Throat  [] Respiratory  [] CV  [] GI  []   [] Musculoskeletal  [] Skin/Breast  [] Neurological  [] Endocrine  [] Heme/Lymph  [] Allergic/Immunologic    Explanation:     MEDICATIONS:    Current Facility-Administered Medications:     acetaminophen (TYLENOL) tablet 650 mg, 650 mg, Oral, Q6H PRN, Monika Hazel MD, 650 mg at 03/08/23 0905    magnesium hydroxide (MILK OF MAGNESIA) 400 MG/5ML suspension 30 mL, 30 mL, Oral, Daily PRN, Monika Hazel MD    nicotine (NICODERM CQ) 21 MG/24HR 1 patch, 1 patch, TransDERmal, Daily, Monika Hazel MD    aluminum & magnesium hydroxide-simethicone (MAALOX) 200-200-20 MG/5ML suspension 30 mL, 30 mL, Oral, PRN, Monika Hazel MD    hydrOXYzine pamoate (VISTARIL) capsule 50 mg, 50 mg, Oral, TID PRN, Monika Hazel MD, 50 mg at 03/07/23 1057    haloperidol (HALDOL) tablet 5 mg, 5 mg, Oral, Q6H PRN **OR** haloperidol lactate (HALDOL) injection 5 mg, 5 mg, IntraMUSCular, Q6H PRN, Daren Fernandez MD    melatonin tablet 3 mg, 3 mg, Oral, Nightly, Daren Fernandez MD, 3 mg at 03/07/23 2131    fluticasone (FLONASE) 50 MCG/ACT nasal spray 2 spray, 2 spray, Each Nostril, Daily, BESSY Marinelli - CNP, 2 spray at 03/08/23 2534    levothyroxine (SYNTHROID) tablet 100 mcg, 100 mcg, Oral, Daily, BESSY Marinelli - CNP, 997 mcg at 03/08/23 0636    [Held by provider] lisinopril (PRINIVIL;ZESTRIL) tablet 10 mg, 10 mg, Oral, Daily, BESSY Marinelli - CNP, 10 mg at 03/07/23 2131    montelukast (SINGULAIR) tablet 10 mg, 10 mg, Oral, Nightly, BESSY Lombardo - CNP, 10 mg at 03/07/23 2130    norethindrone-ethinyl estradiol (MICROGESTIN 1/20) 1-20 MG-MCG per tablet 1 tablet (Patient Supplied), 1 tablet, Oral, Daily, BESSY Marinelli - CNP, 1 tablet at 03/08/23 9019    cetirizine (ZYRTEC) tablet 10 mg, 10 mg, Oral, Daily, BESSY Marinelli - CNP, 10 mg at 03/08/23 2723    clarithromycin (BIAXIN) tablet 250 mg, 250 mg, Oral, Daily, Supriya Eye Edwardk APRGENE - CNP, 399 mg at 03/08/23 0905    budesonide (PULMICORT) nebulizer suspension 500 mcg, 0.5 mg, Nebulization, BID, BESSY Lombardo - CNP, 812 mcg at 03/07/23 2100    arformoterol tartrate (BROVANA) nebulizer solution 15 mcg, 15 mcg, Nebulization, BID, BESSY Lombardo - CNP, 15 mcg at 03/07/23 2100    azelastine (ASTELIN) 0.1 % nasal spray 1 spray (Patient Supplied), 1 spray, Each Nostril, BID, SupriyaBESSY Velazquez - CNP, 1 spray at 03/07/23 2130    One-A-Day Womens TABS 1 tablet (Patient Supplied), 1 tablet, Oral, Daily, BESSY Marinelli CNP, 1 tablet at 03/08/23 0802      Examination:  /65   Pulse (!) 106   Temp (!) 96 °F (35.6 °C) (Temporal)   Resp 16   Ht 5' 3.5\" (1.613 m)   Wt 144 lb 9.6 oz (65.6 kg)   LMP  (LMP Unknown)   SpO2 98%   BMI 25.21 kg/m² Gait - steady  Medication side effects(SE):     Mental Status Examination:    Level of consciousness:  within normal limits   Appearance:  fair grooming and fair hygiene  Behavior/Motor:  no abnormalities noted  Attitude toward examiner:  cooperative  Speech:  spontaneous, normal rate and normal volume   Mood: \" My mood is good. \"  Affect: Bright appropriate and pleasant  Thought processes: Linear thought flight of ideas loose associations  Thought content: Devoid of any auditory visual hallucinations delusions or other perceptual normalities.   Denies SI/HI intent or plan   Language: able to name objects and repeate phrases  Remote Memory: intact  Recent Memory: intact  Cognition:  oriented to person, place, and time   Fund of Knowledge: Vocabulary intact, pt is aware of current events and past history  Attetion and Concentration intact  Insight fair   Judgement fair     ASSESSMENT: Patient symptoms are:  [] Well controlled  [x] Improving  [] Worsening  [] No change      Diagnosis:  Principal Problem:    Autistic disorder  Resolved Problems:    Major depression, single episode      LABS:    Recent Labs     03/06/23  1252   WBC 8.8   HGB 14.8        Recent Labs     03/06/23  1252      K 3.9      CO2 22   BUN 16   CREATININE 0.7   GLUCOSE 120*     Recent Labs     03/06/23  1252   BILITOT 0.3   ALKPHOS 58   AST 22   ALT 19     Lab Results   Component Value Date/Time    LABAMPH NOT DETECTED 03/06/2023 12:52 PM    BARBSCNU NOT DETECTED 03/06/2023 12:52 PM    LABBENZ NOT DETECTED 03/06/2023 12:52 PM    LABMETH NOT DETECTED 03/06/2023 12:52 PM    OPIATESCREENURINE NOT DETECTED 03/06/2023 12:52 PM    PHENCYCLIDINESCREENURINE NOT DETECTED 03/06/2023 12:52 PM    ETOH <10 03/06/2023 12:52 PM     Lab Results   Component Value Date/Time    TSH 3.250 11/15/2022 09:40 AM     No results found for: LITHIUM  No results found for: VALPROATE, CBMZ        Treatment Plan:  Reviewed current Medications with the patient. Risks, benefits, side effects, drug-to-drug interactions and alternatives to treatment were discussed. Collateral information:   CD evaluation  Encourage patient to attend group and other milieu activities. Discharge planning discussed with the patient and treatment team.    We will discontinue Trileptal and Resporal as patient believes that her fall was related to these medications she no longer wants to take them.   We will continue watching patient off of the Prozac due to her impulsivity        PSYCHOTHERAPY/COUNSELING:  [x] Therapeutic interview  [x] Supportive  [] CBT  [] Ongoing  [] Other    [x] Patient continues to need, on a daily basis, active treatment furnished directly by or requiring the supervision of inpatient psychiatric personnel      Anticipated Length of stay:            Electronically signed by BESSY Angelo CNP on 6/5/8702 at 1:01 PM

## 2023-03-08 NOTE — PLAN OF CARE
Problem: Anxiety  Goal: Will report anxiety at manageable levels  Description: INTERVENTIONS:  1. Administer medication as ordered  2. Teach and rehearse alternative coping skills  3. Provide emotional support with 1:1 interaction with staff  3/7/2023 2258 by Susie Paniagua RN  Outcome: [address], but is in control. Problem: Coping  Goal: Pt/Family able to verbalize concerns and demonstrate effective coping strategies  Description: INTERVENTIONS:  1. Assist patient/family to identify coping skills, available support systems and cultural and spiritual values  2. Provide emotional support, including active listening and acknowledgement of concerns of patient and caregivers  3. Reduce environmental stimuli, as able  4. Instruct patient/family in relaxation techniques, as appropriate  5. Assess for spiritual pain/suffering and initiate Spiritual Care, Psychosocial Clinical Specialist consults as needed  3/8/2023 1208 by Augustina High RN  Outcome:  Not Progressing  3/7/2023 2258 by Susie Paniagua RN  Outcome: Progressing  Pt. Refused Trileptal and reports will refuse Risperdal.      Problem: Decision Making  Goal: Pt/Family able to effectively weigh alternatives and participate in decision making related to treatment and care  Description: INTERVENTIONS:  1. Determine when there are differences between patient's view, family's view, and healthcare provider's view of condition  2. Facilitate patient and family articulation of goals for care  3. Help patient and family identify pros/cons of alternative solutions  4. Provide information as requested by patient/family  5. Respect patient/family right to receive or not to receive information  6. Serve as a liaison between patient and family and health care team  7.  Initiate Consults from Ethics, Palliative Care or initiate [address] as is appropriate  3/8/2023 1208 by Augustina High RN  Outcome: Progressing  3/7/2023 2258 by Radha Snyder Lea RN  Outcome:  Not Progressing  PT. IS FOCUSED ON GETTING OUT OF HOSPITAL TO GET OWN APT. .     Problem: Depression/Self Harm  Goal: Effect of psychiatric condition will be minimized and patient will be protected from self harm  Description: INTERVENTIONS:  1. Assess impact of patient's symptoms on level of functioning, self care needs and offer support as indicated  2. Assess patient/family knowledge of depression, impact on illness and need for teaching  3. Provide emotional support, presence and reassurance  4. Assess for possible suicidal thoughts or ideation. If patient expresses suicidal thoughts or statements do not leave alone, initiate Suicide Precautions, move to a room close to the nursing station and obtain sitter  5. Initiate consults as appropriate with Mental Health Professional, Spiritual Care, Psychosocial CNS, and consider a recommendation to the LIP for a Psychiatric Consultation  Outcome: Progressing  PT. DENIES SUICIDAL IDEATIONS AND REPORTS NO THOUGHTS OF SELF HARM. NO SELF HARM BEHAVIORS REPORTED OR OBSERVED. Problem: Involuntary Admit  Goal: Will cooperate with staff recommendations and doctor's orders and will demonstrate appropriate behavior  Description: INTERVENTIONS:  1. Treat underlying conditions and offer medication as ordered  2. Educate regarding involuntary admission procedures and rules  3. Contain excessive/inappropriate behavior per unit and hospital policies  Outcome: not Progressing  PT. REMAINS ON INVOLUNTARY ADMIT STATUS.

## 2023-03-08 NOTE — CARE COORDINATION
ERIK called [facility] to schedule follow up appointments. ERIK scheduled appointment.  ERIK scheduled pt's intake appointment 3/27 at 9 AM.      Shellie  Garima   Location: [address]. Kimberli Valenzuela.  Phone: [phone]  Fax number: [phone]

## 2023-03-08 NOTE — PLAN OF CARE
Problem: Anxiety  Goal: Will report anxiety at manageable levels  Description: INTERVENTIONS:  1. Administer medication as ordered  2. Teach and rehearse alternative coping skills  3. Provide emotional support with 1:1 interaction with staff  3/7/2023 2258 by Gabriel Busby RN  Outcome: Progressing  3/7/2023 1324 by Marti Galvez RN  Outcome: Progressing     Problem: Coping  Goal: Pt/Family able to verbalize concerns and demonstrate effective coping strategies  Description: INTERVENTIONS:  1. Assist patient/family to identify coping skills, available support systems and cultural and spiritual values  2. Provide emotional support, including active listening and acknowledgement of concerns of patient and caregivers  3. Reduce environmental stimuli, as able  4. Instruct patient/family in relaxation techniques, as appropriate  5. Assess for spiritual pain/suffering and initiate Spiritual Care, Psychosocial Clinical Specialist consults as needed  3/7/2023 2258 by Gabriel Busby RN  Outcome: Progressing  3/7/2023 1324 by Marti Galvez RN  Outcome: Progressing     Problem: Decision Making  Goal: Pt/Family able to effectively weigh alternatives and participate in decision making related to treatment and care  Description: INTERVENTIONS:  1. Determine when there are differences between patient's view, family's view, and healthcare provider's view of condition  2. Facilitate patient and family articulation of goals for care  3. Help patient and family identify pros/cons of alternative solutions  4. Provide information as requested by patient/family  5. Respect patient/family right to receive or not to receive information  6. Serve as a liaison between patient and family and health care team  7.  Initiate Consults from Ethics, Palliative Care or initiate [address] as is appropriate  3/7/2023 2258 by Gabriel Busby RN  Outcome: Progressing  3/7/2023 1324 by Marti Galvez RN  Outcome: Progressing      Pt denies SI, HI and AVH. Pt denies depression and anxiety. Pt is focused on discharge so she can find housing. Pt is worried since she only has until March 31st before she has to move out. Pt attended group. Medication compliant. Pt did not want to take her psych medications not understanding that prozac has been discontinued. Pt educated on not taking it at home. Pt focused on interactions with medications. Pt's anxiety calms easy with education. Will continue to monitor.

## 2023-03-08 NOTE — PROGRESS NOTES
Patient attended evening 2010 [facility]. Patient pleasant and engaged in group. Patient was 1 of 9 in attendance.

## 2023-03-08 NOTE — PROGRESS NOTES
Patient attended morning community meeting. Updated on staffing assignments and daily expectations. Shared goal for the day as to fill out an application for [facility].

## 2023-03-08 NOTE — PLAN OF CARE
Problem: Anxiety  Goal: Will report anxiety at manageable levels  Description: INTERVENTIONS:  1. Administer medication as ordered  2. Teach and rehearse alternative coping skills  3. Provide emotional support with 1:1 interaction with staff  Outcome: Progressing     Problem: Coping  Goal: Pt/Family able to verbalize concerns and demonstrate effective coping strategies  Description: INTERVENTIONS:  1. Assist patient/family to identify coping skills, available support systems and cultural and spiritual values  2. Provide emotional support, including active listening and acknowledgement of concerns of patient and caregivers  3. Reduce environmental stimuli, as able  4. Instruct patient/family in relaxation techniques, as appropriate  5. Assess for spiritual pain/suffering and initiate Spiritual Care, Psychosocial Clinical Specialist consults as needed  3/8/2023 1823 by Faustino Daley RN  Outcome: Progressing  3/8/2023 1208 by Lilian Sosa RN  Outcome: Progressing     Problem: Decision Making  Goal: Pt/Family able to effectively weigh alternatives and participate in decision making related to treatment and care  Description: INTERVENTIONS:  1. Determine when there are differences between patient's view, family's view, and healthcare provider's view of condition  2. Facilitate patient and family articulation of goals for care  3. Help patient and family identify pros/cons of alternative solutions  4. Provide information as requested by patient/family  5. Respect patient/family right to receive or not to receive information  6. Serve as a liaison between patient and family and health care team  7. Initiate Consults from Ethics, Palliative Care or initiate [address] [facility] [address] as is appropriate  3/8/2023 1823 by Faustino Daley RN  Outcome: Progressing  3/8/2023 1208 by Lilian Sosa RN  Outcome: Progressing       Pt denies SI, HI and AVH. Pt denies depression and anxiety.  Pt is out on the unit social. Attends group. Medication compliant. Pleasant and cooperative. Will continue to monitor.

## 2023-03-08 NOTE — SIGNIFICANT EVENT
Critical Care - Rapid Response Team Note      Date of event: 3/8/2023   Time of event: [address] 50y.o. year old female   YOB: 1970     PCP:  Deon Barfield,    Location: [facility]   Witnessed? : [x]Yes  [] No  Initial Code status: [x] Full  [] DNR-CCA  []DNR-CC    []Limited  ______________________________________________________________________  Reason for RRT:   Hypotension    Chief Complaint for this admission:   Chief Complaint   Patient presents with    Suicidal     Pt states she has been suicidal for a couple of months. Plan to jump off a bridge. Admit date:  3/6/2023     Admitting Diagnosis: Major depression, single episode [F32.9]  Depression with suicidal ideation [F32. A, R45.851]      Current Diagnosis: The encounter diagnosis was Depression with suicidal ideation. Subjective:     Rapid was called for hypotension. On arrival she was altered and oriented x 4. She was c/o of right sided headache 2/10, continuous and has been using NSAID/tylenol at home. She denies blurry vision, chest pain, leg pain, N/V, diarrhea. Bedside nurse added that the patient came to the nursing station for pain meds. After armas she fell however did not loss consciousness or head her head. Bedside NIHSS 0 and GCS 15.  There were no postictal confusion or seizures like activity witnessed the nurse     Objective:   Initial Assessment on arrival:  Vital signs: Temp: afebrile, BP: systolic 95/38, RR: 15, HR: 70    Airway and Condition: Conscious noted    Lungs And Circulation: clear to auscultation bilaterally noted                  Neurologic: NIHSS : 0  noted      Initial Interventions: Labs ordered: BG  Imaging ordered: none  Meds/Fluids/Rx given:   Juice and water        Subsequent Assessment After Initial Interventions:   Time Since first assessment: 5     Interim Vital Sign Checks: [x] Yes  [] No    Vital signs: Temp: afeb, BP: 105/70, RR: 16, HR: 90s       RRT Assessment and Plan:    Meghan Chen is a 50 y.o. female with  has a past medical history of Anxiety, Common variable immunodeficiency ([address] Dysmenorrhea, Environmental allergies, Hypertension, Kidney stone, and Thyroid disease. who was admitted on 3/6/2023 with admitting diagnosis Major depression, single episode [F32.9]  Depression with suicidal ideation [F32. Maggy Keas  . RRT was called on 3/8/2023 . Initial assessment and interventions as noted above. Current problems include:   Orthostatic hypotension 2/2 poor oral intake     Plan:   Encourage oral feeding   Check orthostatic vitals   Hold BP meds  Fall precautions    ?     Code status: [x] Full  [] DNR-CCA  []DNR-CC []Limited    Disposition:  [x] No transfer   [] Transfer to monitor floor  [] Transfer to: [] MICU [] NICU [] CVICU [] SICU    Discussed with:[] Critical Care Intensivist: Dr. Hilda Pro         [] Primary Care Provider: Dr. Rommel Ramsey (did not answer call)       Kendall Fernández MD PGY-3  3/8/2023 5:31 AM  Attending Physician: Dr. Hilda Pro

## 2023-03-09 ENCOUNTER — TELEPHONE (OUTPATIENT)
Dept: PRIMARY CARE CLINIC | Age: 53
End: 2023-03-09

## 2023-03-09 VITALS
WEIGHT: 144.6 LBS | HEART RATE: 101 BPM | OXYGEN SATURATION: 94 % | BODY MASS INDEX: 24.69 KG/M2 | HEIGHT: 64 IN | SYSTOLIC BLOOD PRESSURE: 130 MMHG | DIASTOLIC BLOOD PRESSURE: 91 MMHG | TEMPERATURE: 98.4 F | RESPIRATION RATE: 16 BRPM

## 2023-03-09 PROCEDURE — 6370000000 HC RX 637 (ALT 250 FOR IP): Performed by: NURSE PRACTITIONER

## 2023-03-09 PROCEDURE — 94640 AIRWAY INHALATION TREATMENT: CPT

## 2023-03-09 RX ORDER — NICOTINE 21 MG/24HR
1 PATCH, TRANSDERMAL 24 HOURS TRANSDERMAL DAILY
Qty: 30 PATCH | Refills: 0
Start: 2023-03-10 | End: 2023-03-15

## 2023-03-09 RX ORDER — MONTELUKAST SODIUM 10 MG/1
TABLET ORAL
Qty: 90 TABLET | Refills: 3 | Status: SHIPPED | OUTPATIENT
Start: 2023-03-09

## 2023-03-09 RX ORDER — LANOLIN ALCOHOL/MO/W.PET/CERES
3 CREAM (GRAM) TOPICAL NIGHTLY
Qty: 30 TABLET | Refills: 0 | Status: SHIPPED | OUTPATIENT
Start: 2023-03-09 | End: 2023-03-15

## 2023-03-09 RX ADMIN — ARFORMOTEROL TARTRATE 15 MCG: 15 SOLUTION RESPIRATORY (INHALATION) at 10:01

## 2023-03-09 RX ADMIN — FLUTICASONE PROPIONATE 2 SPRAY: 50 SPRAY, METERED NASAL at 09:01

## 2023-03-09 RX ADMIN — Medication 1 CAPSULE: at 08:11

## 2023-03-09 RX ADMIN — NORETHINDRONE ACETATE AND ETHINYL ESTRADIOL 1 TABLET: 1; .02 TABLET ORAL at 09:01

## 2023-03-09 RX ADMIN — CETIRIZINE HYDROCHLORIDE 10 MG: 10 TABLET, FILM COATED ORAL at 09:02

## 2023-03-09 RX ADMIN — Medication 1 TABLET: at 08:11

## 2023-03-09 RX ADMIN — CLARITHROMYCIN 250 MG: 250 TABLET ORAL at 09:02

## 2023-03-09 RX ADMIN — BUDESONIDE 500 MCG: 0.5 SUSPENSION RESPIRATORY (INHALATION) at 10:02

## 2023-03-09 RX ADMIN — LEVOTHYROXINE SODIUM 100 MCG: 0.1 TABLET ORAL at 06:45

## 2023-03-09 ASSESSMENT — PAIN SCALES - GENERAL
PAINLEVEL_OUTOF10: 0
PAINLEVEL_OUTOF10: 0

## 2023-03-09 NOTE — CARE COORDINATION
Sw met with pt to confirm d/c plans. Pt brother here to transport pt home. Pt brightened and stated she feels better. Pt to follow up with [facility].      In order to ensure appropriate transition and discharge planning is in place, the following documents have been transmitted to [facility] as the new outpatient provider:    The d/c diagnosis was transmitted to the next care provider  The reason for hospitalization was transmitted to the next care provider  The d/c medications (dosage and indication) were transmitted to the next care provider   The continuing care plan was transmitted to the next care provider

## 2023-03-09 NOTE — TELEPHONE ENCOUNTER
Hospital calling asking for hosp follow up. Pt will be discharging today and has had med changes and stating she needs to be seen ASAP.  Per nurse we can just schedule pt and when she prints up discharge papers the appt will be listed

## 2023-03-09 NOTE — PROGRESS NOTES
[facility]  Discharge Note    Pt discharged with followings belongings:   Dental Appliances: None  Vision - Corrective Lenses: None  Hearing Aid: None  Jewelry: None  Body Piercings Removed: N/A  Clothing: Pants, Shirt (5 shirts, 3 pants)  Other Valuables: Personal Toiletries (mail)   Valuables sent home with pt., along with safety plan, medications from home and copy of AVS. All personal items and home medications and lockered items were returned to patient. Patient educated on aftercare instructions:  completed, reviewed and signed. Information faxed to  [facility] by  . Patient verbalize understanding of AVS:   yes with stated potential to comply to same. .    Status EXAM upon discharge:  Mental Status and Behavioral Exam  Level of Assistance: Independent/Self  Facial Expression: congruent   Affect: Blunted  Level of Consciousness: Alert  Frequency of Checks: 4 times per hour, close  Mood:  pleasant  Motor Activity:Normal: Yes  Motor Activity: Other (comment) (WNL)  Eye Contact: Good  Observed Behavior: Cooperative  Sexual Misconduct History: Current - no  Preception: [place] to person, [place] to time, [place] to place, [place] to situation  Attention: improved  Thought Content: Preoccupations with follow up and medications  Depression Symptoms: Impaired concentration  Anxiety Symptoms: Generalized  Nisha Symptoms: No problems reported or observed.   Hallucinations: None  Delusions: No  Memory:Normal: Yes  Insight and Judgment: improved  Insight and Judgment: Other (comment) (IMPAIRED, IMPROVED)    Tobacco Screening:  Practical Counseling, on admission, dean X, if applicable and completed (first 3 are required if patient doesn't refuse):            ( ) Recognizing danger situations (included triggers and roadblocks)                    ( ) Coping skills (new ways to manage stress,relaxation techniques, changing routine, distraction) ( ) Basic information about quitting (benefits of quitting, techniques in how to quit, available resources  ( ) Referral for counseling faxed to [facility]                                                                                                                   ( ) Patient refused counseling  ( ) Patient refused referral  ( ) Patient refused prescription upon discharge  (  x) Patient has not smoked in the last 30 days    Metabolic Screening:    Lab Results   Component Value Date    LABA1C 5.3 11/15/2022       Lab Results   Component Value Date    CHOL 185 11/15/2022    CHOL 155 04/19/2022    CHOL 174 10/12/2021    CHOL 176 04/09/2021     Lab Results   Component Value Date    TRIG 85 11/15/2022    TRIG 94 04/19/2022    TRIG 71 10/12/2021    TRIG 46 04/09/2021     Lab Results   Component Value Date    HDL 93 11/15/2022    HDL 78 04/19/2022    HDL 84 10/12/2021     04/09/2021     No components found for: LDLCAL  Lab Results   Component Value Date    LABVLDL 17 11/15/2022    LABVLDL 19 04/19/2022    LABVLDL 14 10/12/2021    LABVLDL 9 04/09/2021       Christel Peñaloza RN

## 2023-03-09 NOTE — DISCHARGE SUMMARY
DISCHARGE SUMMARY      Patient ID:  Kendra Cline  [id number]  50 y.o.  1970    Admit date: 3/6/2023    Discharge date and time: 3/9/2023    Admitting Physician: Melissa William MD     Discharge Physician: Dr Laura Ward MD    Discharge Diagnoses:   Patient Active Problem List   Diagnosis    Environmental allergies    Acquired hypothyroidism    CVID (common variable immunodeficiency) ([email or web address]    Primary hypertension    DESIRAE (generalized anxiety disorder)    Autistic disorder    Personality disorder [facility])       Admission Condition: poor    Discharged Condition: stable    Admission Circumstance:  presented to the ED reporting suicidal ideations with a plan to jump off a bridge that she thought of in [place]. PAST MEDICAL/PSYCHIATRIC HISTORY:   Past Medical History:   Diagnosis Date    Anxiety     Common variable immunodeficiency ([email or web address] 75.)     FU with Dr. Asim Auguste, no recent issues, stable     Dysmenorrhea     Environmental allergies     Hypertension     Kidney stone     Thyroid disease        FAMILY/SOCIAL HISTORY:  History reviewed. No pertinent family history.   Social History     Socioeconomic History    Marital status: Single     Spouse name: Not on file    Number of children: Not on file    Years of education: Not on file    Highest education level: Not on file   Occupational History    Not on file   Tobacco Use    Smoking status: Never    Smokeless tobacco: Never   Vaping Use    Vaping Use: Never used   Substance and Sexual Activity    Alcohol use: No    Drug use: No    Sexual activity: Not Currently     Partners: Male   Other Topics Concern    Not on file   Social History Narrative    Not on file     Social Determinants of Health     Financial Resource Strain: Not on file   Food Insecurity: Not on file   Transportation Needs: Not on file   Physical Activity: Not on file   Stress: Not on file   Social Connections: Not on file   Intimate Partner Violence: Not on file   Housing Stability: Not on file MEDICATIONS:    Current Facility-Administered Medications:     Extra Strength Probiotic (10 billion CFUs) (Patient Supplied), 1 capsule, Oral, Daily, BESSY Gardner CNP, 1 capsule at 03/09/23 3599    acetaminophen (TYLENOL) tablet 650 mg, 650 mg, Oral, Q6H PRN, Ba Alfaro MD, 650 mg at 03/08/23 0905    magnesium hydroxide (MILK OF MAGNESIA) 400 MG/5ML suspension 30 mL, 30 mL, Oral, Daily PRN, Ba Alfaro MD    nicotine (NICODERM CQ) 21 MG/24HR 1 patch, 1 patch, TransDERmal, Daily, Ba Alfaro MD    aluminum & magnesium hydroxide-simethicone (MAALOX) 200-200-20 MG/5ML suspension 30 mL, 30 mL, Oral, PRN, Ba Alfaro MD    hydrOXYzine pamoate (VISTARIL) capsule 50 mg, 50 mg, Oral, TID PRN, Ba Alfaro MD, 50 mg at 03/07/23 1057    haloperidol (HALDOL) tablet 5 mg, 5 mg, Oral, Q6H PRN **OR** haloperidol lactate (HALDOL) injection 5 mg, 5 mg, IntraMUSCular, Q6H PRN, Ba Alfaro MD    melatonin tablet 3 mg, 3 mg, Oral, Nightly, Ba Alfaro MD, 3 mg at 03/07/23 2131    fluticasone (FLONASE) 50 MCG/ACT nasal spray 2 spray, 2 spray, Each Nostril, Daily, BESSY Gardner CNP, 2 spray at 03/09/23 0901    levothyroxine (SYNTHROID) tablet 100 mcg, 100 mcg, Oral, Daily, Belkis Flattery Dellick, APRN - CNP, 507 mcg at 03/09/23 0645    [Held by provider] lisinopril (PRINIVIL;ZESTRIL) tablet 10 mg, 10 mg, Oral, Daily, Belkis Flattery DellickBESSY CNP, 10 mg at 03/07/23 2131    montelukast (SINGULAIR) tablet 10 mg, 10 mg, Oral, Nightly, Belkis Flattery DellickBESSY CNP, 10 mg at 03/08/23 2235    norethindrone-ethinyl estradiol (MICROGESTIN 1/20) 1-20 MG-MCG per tablet 1 tablet (Patient Supplied), 1 tablet, Oral, Daily, BESSY Burger CNP, 1 tablet at 03/09/23 0901    cetirizine (ZYRTEC) tablet 10 mg, 10 mg, Oral, Daily, BESSY Burger CNP, 10 mg at 03/09/23 0902    clarithromycin (BIAXIN) tablet 250 mg, 250 mg, Oral, Daily, BESSY Gardner CNP, 444 mg at 03/09/23 0679 budesonide (PULMICORT) nebulizer suspension 500 mcg, 0.5 mg, Nebulization, BID, Spencer Leventhal DellickBESSY - CNP, 426 mcg at 03/09/23 1002    arformoterol tartrate (BROVANA) nebulizer solution 15 mcg, 15 mcg, Nebulization, BID, Spencer Leventhal Dellick, APRN - CNP, 15 mcg at 03/09/23 1001    azelastine (ASTELIN) 0.1 % nasal spray 1 spray (Patient Supplied), 1 spray, Each Nostril, BID, BESSY Swan - CNP, 1 spray at 03/08/23 2235    One-A-Day Womens TABS 1 tablet (Patient Supplied), 1 tablet, Oral, Daily, BESSY Swan CNP, 1 tablet at 03/09/23 0054    Current Outpatient Medications:     melatonin 3 MG TABS tablet, Take 1 tablet by mouth nightly, Disp: 30 tablet, Rfl: 0    [START ON 3/10/2023] nicotine (NICODERM CQ) 21 MG/24HR, Place 1 patch onto the skin daily, Disp: 30 patch, Rfl: 0    clarithromycin (BIAXIN) 250 MG tablet, Take 250 mg by mouth daily Indications: Common Variable Immunodeficiency, Disp: , Rfl:     cetirizine (ZYRTEC) 10 MG tablet, Take 10 mg by mouth daily, Disp: , Rfl:     montelukast (SINGULAIR) 10 MG tablet, TAKE 1 TABLET AT BEDTIME, Disp: 90 tablet, Rfl: 3    fluticasone (FLONASE) 50 MCG/ACT nasal spray, instill 1 to 2 sprays into each nostril every morning, Disp: 3 each, Rfl: 1    SYMBICORT 160-4.5 MCG/ACT AERO, Inhale 2 puffs into the lungs 2 times daily, Disp: 3 each, Rfl: 1    budesonide-formoterol (SYMBICORT) 160-4.5 MCG/ACT AERO, inhale 1 to 2 puffs by mouth every 12 hours Rinse mouth after use, Disp: 10.2 g, Rfl: 5    levothyroxine (SYNTHROID) 100 MCG tablet, TAKE 1 TABLET DAILY, Disp: 90 tablet, Rfl: 3    norethindrone-ethinyl estradiol (RAMILA 1/20) 1-20 MG-MCG per tablet, Take one tablet by mouth daily, Disp: 3 packet, Rfl: 3    triamcinolone (ARISTOCORT) 0.5 % cream, Apply topically 3 times daily. (Patient not taking: Reported on 3/7/2023), Disp: 15 g, Rfl: 1    clobetasol (TEMOVATE) 0.05 % ointment, Apply topically 2 times daily.  (Patient not taking: Reported on 3/7/2023), Disp: 60 g, Rfl: 1    lidocaine (XYLOCAINE) 5 % ointment, Apply topically as needed. (Patient not taking: Reported on 3/7/2023), Disp: 50 g, Rfl: 11    Turmeric Curcumin 500 MG CAPS, Take by mouth, Disp: , Rfl:     Multiple Vitamins-Minerals (ONE-A-DAY WOMENS PO), Take by mouth, Disp: , Rfl:     Probiotic Product (PROBIOTIC DAILY PO), Take by mouth LD 4/6/16, Disp: , Rfl:     Omega-3 Fatty Acids (FISH OIL PO), Take by mouth LD 4/6/16, Disp: , Rfl:     azelastine (ASTELIN) 137 MCG/SPRAY nasal spray, 1 spray by Nasal route 2 times daily Use in each nostril as directed, Disp: , Rfl:     Examination:  BP (!) 130/91   Pulse (!) 101   Temp 98.4 °F (36.9 °C) (Oral)   Resp 16   Ht 5' 3.5\" (1.613 m)   Wt 144 lb 9.6 oz (65.6 kg)   LMP  (LMP Unknown)   SpO2 94%   BMI 25.21 kg/m²   Gait - steady    HOSPITAL COURSE[de-identified]  Patient was admitted to the unit on 3/6/2023 was closely monitored for suicidal ideations. She was evaluated and recommend to be treated with Trileptal 150 mg twice daily and Resporal 0.5 mg at bedtime. Patient had a syncopal episode after starting medications this was likely related to her blood pressure pill which patient takes on a nightly basis however patient attributed this to the new medications. Patient no longer want to take the psychiatric medications she was therefore recommended to continue off of the Prozac as this could lead to more impulsivity. She was monitored on the unit she did not show any impulsive behavior and vehemently denied any suicidal ideations intent or plan. Patient is recommended not to continue the lisinopril until seen by her outpatient primary care physician she is also recommended to follow-up with her outpatient primary care provider or neurology based on the chronic results of her CT scan. Medical events were insignificant and patient continued to improve on the floor. She started coming out of her room she was attending groups to socializing with peers.   She never made any suicidal statements or any suicidal gestures while in the unit. Social workers obtain confirmation patient's brother who was able to voice any concerns that he had. He reported no safety concerns no access to any guns. Patient was able to state their future plans spontaneously with richness of detail treatment team felt the patient obtain the maximum benefit for her hospitalization She was set up with an outpatient mental health agency for outpatient follow-up services . At the time of discharge patient  did not show impulsive behavior. She was up on the unit she was attending groups and socializing with peers. She vehemently denied any suicidal homicidal ideations intent or plan. She was eating well and sleeping well there are no neurovegetative signs or symptoms of depression she denied any auditory visualizations. There are no overt or covert signs psychosis. She was appreciative of the help that she received here. This patient no longer meets criteria for inpatient hospitalization. No AVH or paranoid thoughts  No Hopeless or worthless feeling  No active SI/HI  Appetite:  [x] Normal  [] Increased  [] Decreased    Sleep:       [x] Normal  [] Fair       [] Poor            Energy:    [x] Normal  [] Increased  [] Decreased     SI [] Present  [x] Absent  HI  []Present  [x] Absent   Aggression:  [] yes  [x] no  Patient is [x] able  [] unable to CONTRACT FOR SAFETY   Medication side effects(SE):  [x] None(Psych. Meds.) [] Other      Mental Status Examination on discharge:    Level of consciousness:  within normal limits   Appearance:  well-appearing  Behavior/Motor:  no abnormalities noted  Attitude toward examiner:  attentive and good eye contact  Speech:  spontaneous, normal rate and normal volume   Mood: \"I feel good. \"  Affect: Appropriate and pleasant  Thought processes: Linear without flight of ideas loose associations  Thought content: Devoid of any auditory visual hallucinations delusions or any other perceptual abnormalities. Denies SI/HI intent or plan  Cognition:  oriented to person, place, and time   Concentration intact  Memory intact  Insight good   Judgement fair   Fund of Knowledge adequate      ASSESSMENT:  Patient symptoms are:  [x] Well controlled  [x] Improving  [] Worsening  [] No change    Reason for more than one antipsychotic:  [] N/A  [] 3 Failed Monotherapy attempts (Drugs tried:)  [] Crossover to a new antipsychotic  [] Taper to Monotherapy from Polypharmacy  [] Augmentation of clozapine therapy due to treatment resistance to single therapy    Diagnosis:  Principal Problem:    Autistic disorder  Active Problems:    Personality disorder ([facility]  Resolved Problems:    Major depression, single episode      LABS:    No results for input(s): WBC, HGB, PLT in the last 72 hours. No results for input(s): NA, K, CL, CO2, BUN, CREATININE, GLUCOSE in the last 72 hours. No results for input(s): BILITOT, ALKPHOS, AST, ALT in the last 72 hours. Lab Results   Component Value Date/Time    LABAMPH NOT DETECTED 03/06/2023 12:52 PM    BARBSCNU NOT DETECTED 03/06/2023 12:52 PM    LABBENZ NOT DETECTED 03/06/2023 12:52 PM    LABMETH NOT DETECTED 03/06/2023 12:52 PM    OPIATESCREENURINE NOT DETECTED 03/06/2023 12:52 PM    PHENCYCLIDINESCREENURINE NOT DETECTED 03/06/2023 12:52 PM    ETOH <10 03/06/2023 12:52 PM     Lab Results   Component Value Date/Time    TSH 3.250 11/15/2022 09:40 AM     No results found for: LITHIUM  No results found for: VALPROATE, CBMZ    RISK ASSESSMENT AT DISCHARGE: Low risk for suicide and homicide. Treatment Plan:  Reviewed current Medications with the patient. Education provided on the complaince with treatment. Risks, benefits, side effects, drug-to-drug interactions and alternatives to treatment were discussed. Encourage patient to attend outpatient follow up appointment and therapy.     Patient was advised to call the outpatient provider, visit the nearest ED or call 911 if symptoms are not manageable. Patient's family member was contacted prior to the discharge. Medication List        START taking these medications      melatonin 3 MG Tabs tablet  Take 1 tablet by mouth nightly     nicotine 21 MG/24HR  Commonly known as: [address] [facility] 1 patch onto the skin daily  Start taking on: March 10, 2023            CHANGE how you take these medications      montelukast 10 MG tablet  Commonly known as: SINGULAIR  TAKE 1 TABLET AT BEDTIME  What changed: additional instructions            CONTINUE taking these medications      azelastine 0.1 % nasal spray  Commonly known as: ASTELIN     * budesonide-formoterol 160-4.5 MCG/ACT Aero  Commonly known as: Symbicort  inhale 1 to 2 puffs by mouth every 12 hours Rinse mouth after use     * Symbicort 160-4.5 MCG/ACT Aero  Generic drug: budesonide-formoterol  Inhale 2 puffs into the lungs 2 times daily     cetirizine 10 MG tablet  Commonly known as: ZYRTEC     clarithromycin 250 MG tablet  Commonly known as: BIAXIN     FISH OIL PO     fluticasone 50 MCG/ACT nasal spray  Commonly known as: FLONASE  instill 1 to 2 sprays into each nostril every morning     levothyroxine 100 MCG tablet  Commonly known as: SYNTHROID  TAKE 1 TABLET DAILY     norethindrone-ethinyl estradiol 1-20 MG-MCG per tablet  Commonly known as: [place] 1/20  Take one tablet by mouth daily     ONE-A-DAY WOMENS PO     PROBIOTIC DAILY PO     Turmeric Curcumin 500 MG Caps           * This list has 2 medication(s) that are the same as other medications prescribed for you. Read the directions carefully, and ask your doctor or other care provider to review them with you. STOP taking these medications      FLUoxetine 20 MG capsule  Commonly known as: PROZAC     lisinopril 10 MG tablet  Commonly known as: PRINIVIL;ZESTRIL            ASK your doctor about these medications      clobetasol 0.05 % ointment  Commonly known as: Temovate  Apply topically 2 times daily. lidocaine 5 % ointment  Commonly known as: XYLOCAINE  Apply topically as needed. triamcinolone 0.5 % cream  Commonly known as: ARISTOCORT  Apply topically 3 times daily.                Where to Get Your Medications        These medications were sent to [facility] [address] [place] [address]      Phone: [phone]   montelukast 10 MG tablet       These medications were sent to [facility] [address] [address]      Phone: [phone]   melatonin 3 MG Tabs tablet       Information about where to get these medications is not yet available    Ask your nurse or doctor about these medications  nicotine 21 MG/24HR       Patient is counseled most been compliant with all medications outpatient follow-up appointments    Patient is discharged home in stable condition      TIME SPEND - [facility] [address]41, DISCHARGE SUMMARY, MEDICATION RECONCILIATION AND FOLLOW UP CARE     Signed:  BESSY Mcdonald - ZARINA  0/1/4227  2:19 PM

## 2023-03-09 NOTE — PROGRESS NOTES
Patient attended community meeting   Was updated on expectations of the unit, staffing, and programming  Patient shared goal for today as stay positive and move forward  Patient 1 of 12 in attendance.

## 2023-03-10 ENCOUNTER — TELEPHONE (OUTPATIENT)
Dept: PRIMARY CARE CLINIC | Age: 53
End: 2023-03-10

## 2023-03-10 NOTE — TELEPHONE ENCOUNTER
----- Message from Zahira Madrigal sent at 3/9/2023  2:09 PM EST -----  Subject: Appointment Request    Reason for Call: Established Patient Appointment needed: Routine Existing   Condition Follow Up    QUESTIONS    Reason for appointment request? Available appointments did not meet   patient need     Additional Information for Provider? patient needs an appt within the next   week Per [facility]   ---------------------------------------------------------------------------  --------------  [id number] [employer]  [phone]; OK to leave message on voicemail  ---------------------------------------------------------------------------  --------------  SCRIPT ANSWERS  MARCELID Screen: Michelle Granda

## 2023-03-10 NOTE — TELEPHONE ENCOUNTER
Lisinopril was discontinued while at hospital. Had episode of syncope and when her BP was checked - it was low. Checked blood pressure this morning (was told by NP upon discharge yesterday to take it this morning) and it was 155/109. Medication was discontinued Wednesday morning. Asymptomatic per patient.

## 2023-03-15 ENCOUNTER — OFFICE VISIT (OUTPATIENT)
Dept: PRIMARY CARE CLINIC | Age: 53
End: 2023-03-15

## 2023-03-15 VITALS
DIASTOLIC BLOOD PRESSURE: 88 MMHG | WEIGHT: 149.5 LBS | BODY MASS INDEX: 25.52 KG/M2 | OXYGEN SATURATION: 97 % | SYSTOLIC BLOOD PRESSURE: 132 MMHG | HEIGHT: 64 IN | HEART RATE: 113 BPM

## 2023-03-15 DIAGNOSIS — F60.9 PERSONALITY DISORDER (HCC): Primary | ICD-10-CM

## 2023-03-15 DIAGNOSIS — Z09 HOSPITAL DISCHARGE FOLLOW-UP: ICD-10-CM

## 2023-03-15 DIAGNOSIS — J06.9 UPPER RESPIRATORY TRACT INFECTION, UNSPECIFIED TYPE: ICD-10-CM

## 2023-03-15 DIAGNOSIS — R45.851 SUICIDAL IDEATION: ICD-10-CM

## 2023-03-15 DIAGNOSIS — I10 PRIMARY HYPERTENSION: ICD-10-CM

## 2023-03-15 DIAGNOSIS — R55 SYNCOPE, UNSPECIFIED SYNCOPE TYPE: ICD-10-CM

## 2023-03-15 RX ORDER — LISINOPRIL 10 MG/1
10 TABLET ORAL DAILY
Qty: 90 TABLET | Refills: 1 | Status: SHIPPED | OUTPATIENT
Start: 2023-03-15

## 2023-03-15 RX ORDER — AZITHROMYCIN 250 MG/1
TABLET, FILM COATED ORAL
Qty: 6 TABLET | Refills: 0 | Status: SHIPPED | OUTPATIENT
Start: 2023-03-15 | End: 2023-03-20

## 2023-03-15 SDOH — ECONOMIC STABILITY: INCOME INSECURITY: HOW HARD IS IT FOR YOU TO PAY FOR THE VERY BASICS LIKE FOOD, HOUSING, MEDICAL CARE, AND HEATING?: NOT HARD AT ALL

## 2023-03-15 SDOH — ECONOMIC STABILITY: HOUSING INSECURITY
IN THE LAST 12 MONTHS, WAS THERE A TIME WHEN YOU DID NOT HAVE A STEADY PLACE TO SLEEP OR SLEPT IN A SHELTER (INCLUDING NOW)?: NO

## 2023-03-15 SDOH — ECONOMIC STABILITY: FOOD INSECURITY: WITHIN THE PAST 12 MONTHS, YOU WORRIED THAT YOUR FOOD WOULD RUN OUT BEFORE YOU GOT MONEY TO BUY MORE.: NEVER TRUE

## 2023-03-15 SDOH — ECONOMIC STABILITY: FOOD INSECURITY: WITHIN THE PAST 12 MONTHS, THE FOOD YOU BOUGHT JUST DIDN'T LAST AND YOU DIDN'T HAVE MONEY TO GET MORE.: NEVER TRUE

## 2023-03-15 ASSESSMENT — PATIENT HEALTH QUESTIONNAIRE - PHQ9
2. FEELING DOWN, DEPRESSED OR HOPELESS: 3
10. IF YOU CHECKED OFF ANY PROBLEMS, HOW DIFFICULT HAVE THESE PROBLEMS MADE IT FOR YOU TO DO YOUR WORK, TAKE CARE OF THINGS AT HOME, OR GET ALONG WITH OTHER PEOPLE: 2
SUM OF ALL RESPONSES TO PHQ9 QUESTIONS 1 & 2: 4
4. FEELING TIRED OR HAVING LITTLE ENERGY: 2
9. THOUGHTS THAT YOU WOULD BE BETTER OFF DEAD, OR OF HURTING YOURSELF: 0
SUM OF ALL RESPONSES TO PHQ QUESTIONS 1-9: 8
SUM OF ALL RESPONSES TO PHQ QUESTIONS 1-9: 8
6. FEELING BAD ABOUT YOURSELF - OR THAT YOU ARE A FAILURE OR HAVE LET YOURSELF OR YOUR FAMILY DOWN: 1
3. TROUBLE FALLING OR STAYING ASLEEP: 0
1. LITTLE INTEREST OR PLEASURE IN DOING THINGS: 1
8. MOVING OR SPEAKING SO SLOWLY THAT OTHER PEOPLE COULD HAVE NOTICED. OR THE OPPOSITE, BEING SO FIGETY OR RESTLESS THAT YOU HAVE BEEN MOVING AROUND A LOT MORE THAN USUAL: 0
SUM OF ALL RESPONSES TO PHQ QUESTIONS 1-9: 8
7. TROUBLE CONCENTRATING ON THINGS, SUCH AS READING THE NEWSPAPER OR WATCHING TELEVISION: 1
5. POOR APPETITE OR OVEREATING: 0
SUM OF ALL RESPONSES TO PHQ QUESTIONS 1-9: 8

## 2023-03-15 ASSESSMENT — ENCOUNTER SYMPTOMS
VOMITING: 0
NAUSEA: 0
WHEEZING: 0
SORE THROAT: 0
COUGH: 0
SHORTNESS OF BREATH: 0
SINUS PAIN: 0
BACK PAIN: 1
DIARRHEA: 0
SINUS PRESSURE: 1
ABDOMINAL PAIN: 0
RHINORRHEA: 1
EYE DISCHARGE: 0
CONSTIPATION: 0

## 2023-03-15 NOTE — PROGRESS NOTES
Post-Discharge Transitional Care Follow Up      Josette Valente   YOB: 1970    Date of Office Visit:  3/15/2023  Date of Hospital Admission: 3/6/23  Date of Hospital Discharge: 3/9/23  Readmission Risk Score (high >=14%. Medium >=10%):Readmission Risk Score: 7.6      Care management risk score Rising risk (score 2-5) and Complex Care (Scores >=6): No Risk Score On File     Non face to face  following discharge, date last encounter closed (first attempt may have been earlier): *No documented post hospital discharge outreach found in the last 14 days     Call initiated 2 business days of discharge: *No response recorded in the last 14 days     Personality disorder ([facility] [address]  Seeing Psych on 3/27. No current medications as they were discontinued in the hospital.     Suicidal ideation  Patient denies at this time. Syncope, unspecified syncope type    Hospital discharge follow-up  -     VT DISCHARGE MEDS RECONCILED W/ CURRENT OUTPATIENT MED LIST    Upper respiratory tract infection, unspecified type  -     azithromycin (ZITHROMAX) 250 MG tablet; Take 2 tablets by mouth daily for 1 day, THEN 1 tablet daily for 4 days. , Disp-6 tablet, R-0Normal  Patient's symptoms consistent with URI. She is in no acute distress and VSS. Recommended that she use Flonase/Nasacort, Claritin/Zyrtec and Mucinex to help with cough. All conservative measures including fluid hydration and hand washing also discussed. Patient to call or return for repeat exam if symptoms are worsening. Primary hypertension  -     lisinopril (PRINIVIL;ZESTRIL) 10 MG tablet; Take 1 tablet by mouth daily, Disp-90 tablet, R-1Normal  Restarted Lisinopril after discharge. Controlled today. Continue use. Medical Decision Making: high complexity  Return in about 2 months (around 5/15/2023), or if symptoms worsen or fail to improve, for Chronic medical conditions.     On this date 3/15/2023 I have spent 45 minutes reviewing previous notes, test results and face to face with the patient discussing the diagnosis and importance of compliance with the treatment plan as well as documenting on the day of the visit. Subjective:     Inpatient course: Discharge summary reviewed- see chart. Interval history/Current status: 51 yo female presents for hospital discharge follow up. Seen in the ER because she was having suicidal thoughts with intent to drive to [facility] and jump off a bridge. Admitted to the Psych unit. Prozac was discontinued. Trialed on Risperdone and Trileptal but ended up having a syncopal episode. Both medications were stopped. Today, she feels fatigued and like she has a URI \"coming on\". Otherwise feeling well. Has appt with Psych on 3/27. Patient Active Problem List   Diagnosis    Environmental allergies    Acquired hypothyroidism    CVID (common variable immunodeficiency) ([facility] [address]    Primary hypertension    DESIRAE (generalized anxiety disorder)    Autistic disorder    Personality disorder ([facility] [address]       Medications listed as ordered at the time of discharge from hospital     Medication List            Accurate as of March 15, 2023 12:36 PM. If you have any questions, ask your nurse or doctor. START taking these medications      azithromycin 250 MG tablet  Commonly known as: ZITHROMAX  Take 2 tablets by mouth daily for 1 day, THEN 1 tablet daily for 4 days. Start taking on: March 15, 2023  Started by: Petra Olmedo DO     lisinopril 10 MG tablet  Commonly known as: PRINIVIL;ZESTRIL  Take 1 tablet by mouth daily  Started by:  Lawrence Mckeon DO            CONTINUE taking these medications      azelastine 0.1 % nasal spray  Commonly known as: ASTELIN     budesonide-formoterol 160-4.5 MCG/ACT Aero  Commonly known as: Symbicort  inhale 1 to 2 puffs by mouth every 12 hours Rinse mouth after use     cetirizine 10 MG tablet  Commonly known as: ZYRTEC     clarithromycin 250 MG tablet  Commonly known as: mascotsecret FISH OIL PO     fluticasone 50 MCG/ACT nasal spray  Commonly known as: FLONASE  instill 1 to 2 sprays into each nostril every morning     levothyroxine 100 MCG tablet  Commonly known as: SYNTHROID  TAKE 1 TABLET DAILY     lidocaine 5 % ointment  Commonly known as: XYLOCAINE  Apply topically as needed. montelukast 10 MG tablet  Commonly known as: SINGULAIR  TAKE 1 TABLET AT BEDTIME     norethindrone-ethinyl estradiol 1-20 MG-MCG per tablet  Commonly known as: Clay 1/20  Take one tablet by mouth daily     ONE-A-DAY WOMENS PO     PROBIOTIC DAILY PO     Turmeric Curcumin 500 MG Caps            STOP taking these medications      triamcinolone 0.5 % cream  Commonly known as: ARISTOCORT  Stopped by: Jordi Martinez DO               Where to Get Your Medications        These medications were sent to [facility] , [address] [phone] - f [phone]  [place] [address]      Phone: [phone]   lisinopril 10 MG tablet       These medications were sent to [address], [address] [facility] [phone] - f [phone]  [facility], [address] [place] [id number]      Phone: [phone]   azithromycin 250 MG tablet          Medications marked \"taking\" at this time  Outpatient Medications Marked as Taking for the 3/15/23 encounter (Office Visit) with Jordi Martinez DO   Medication Sig Dispense Refill    azithromycin (ZITHROMAX) 250 MG tablet Take 2 tablets by mouth daily for 1 day, THEN 1 tablet daily for 4 days.  6 tablet 0    lisinopril (PRINIVIL;ZESTRIL) 10 MG tablet Take 1 tablet by mouth daily 90 tablet 1    montelukast (SINGULAIR) 10 MG tablet TAKE 1 TABLET AT BEDTIME 90 tablet 3    clarithromycin (BIAXIN) 250 MG tablet Take 250 mg by mouth daily Indications: Common Variable Immunodeficiency      cetirizine (ZYRTEC) 10 MG tablet Take 10 mg by mouth daily      fluticasone (FLONASE) 50 MCG/ACT nasal spray instill 1 to 2 sprays into each nostril every morning 3 each 1    budesonide-formoterol (SYMBICORT) 160-4.5 MCG/ACT AERO inhale 1 to 2 puffs by mouth every 12 hours Rinse mouth after use 10.2 g 5    levothyroxine (SYNTHROID) 100 MCG tablet TAKE 1 TABLET DAILY 90 tablet 3    norethindrone-ethinyl estradiol (RAMILA 1/20) 1-20 MG-MCG per tablet Take one tablet by mouth daily 3 packet 3    lidocaine (XYLOCAINE) 5 % ointment Apply topically as needed. 50 g 11    Turmeric Curcumin 500 MG CAPS Take by mouth      Multiple Vitamins-Minerals (ONE-A-DAY WOMENS PO) Take by mouth      Probiotic Product (PROBIOTIC DAILY PO) Take by mouth LD 4/6/16      Omega-3 Fatty Acids (FISH OIL PO) Take by mouth LD 4/6/16      azelastine (ASTELIN) 137 MCG/SPRAY nasal spray 1 spray by Nasal route 2 times daily Use in each nostril as directed          Medications patient taking as of now reconciled against medications ordered at time of hospital discharge: Yes    Review of Systems   Constitutional:  Negative for appetite change, chills, fatigue and fever. HENT:  Positive for congestion, postnasal drip, rhinorrhea and sinus pressure. Negative for ear pain, sinus pain and sore throat. Eyes:  Negative for discharge. Respiratory:  Negative for cough, shortness of breath and wheezing. Cardiovascular:  Negative for chest pain and palpitations. Gastrointestinal:  Negative for abdominal pain, constipation, diarrhea, nausea and vomiting. Musculoskeletal:  Positive for back pain. Negative for arthralgias. Skin:  Negative for rash. Allergic/Immunologic: Positive for environmental allergies and immunocompromised state. Neurological:  Negative for dizziness and headaches. Hematological:  Negative for adenopathy. Psychiatric/Behavioral:  Negative for dysphoric mood. The patient is nervous/anxious. All other systems reviewed and are negative.     Objective:    /88   Pulse (!) 113   Ht 5' 3.5\" (1.613 m)   Wt 149 lb 8 oz (67.8 kg)   LMP  (LMP Unknown)   SpO2 97%   BMI 26.07 kg/m²   Physical Exam  Vitals and nursing note reviewed. Constitutional:       General: She is not in acute distress. Appearance: Normal appearance. She is well-developed and normal weight. She is not ill-appearing. HENT:      Head: Normocephalic and atraumatic. Right Ear: Hearing, tympanic membrane, ear canal and external ear normal. There is no impacted cerumen. Left Ear: Hearing, tympanic membrane, ear canal and external ear normal. There is no impacted cerumen. Nose: Congestion and rhinorrhea present. No mucosal edema. Right Sinus: No maxillary sinus tenderness or frontal sinus tenderness. Left Sinus: No maxillary sinus tenderness or frontal sinus tenderness. Mouth/Throat:      Mouth: Mucous membranes are moist.      Pharynx: Posterior oropharyngeal erythema present. No oropharyngeal exudate. Eyes:      General: Lids are normal. No scleral icterus. Right eye: No discharge. Left eye: No discharge. Extraocular Movements: Extraocular movements intact. Conjunctiva/sclera: Conjunctivae normal.   Neck:      Thyroid: No thyromegaly. Vascular: No carotid bruit. Cardiovascular:      Rate and Rhythm: Normal rate and regular rhythm. Heart sounds: Normal heart sounds. No murmur heard. Pulmonary:      Effort: Pulmonary effort is normal. No respiratory distress. Breath sounds: Normal breath sounds. No wheezing. Musculoskeletal:         General: No tenderness or deformity. Normal range of motion. Cervical back: Normal range of motion and neck supple. No tenderness. Right lower leg: No edema. Left lower leg: No edema. Lymphadenopathy:      Cervical: No cervical adenopathy. Skin:     General: Skin is warm and dry. Findings: No rash. Neurological:      General: No focal deficit present. Mental Status: She is alert and oriented to person, place, and time. Gait: Gait normal.   Psychiatric:         Mood and Affect: Affect normal. Mood is anxious. Speech: Speech normal.         Behavior: Behavior normal.         Thought Content: Thought content normal.       An electronic signature was used to authenticate this note.   --Constantine Shone, DO

## 2023-03-20 ENCOUNTER — TELEPHONE (OUTPATIENT)
Dept: CHIROPRACTIC MEDICINE | Age: 53
End: 2023-03-20

## 2023-03-20 NOTE — TELEPHONE ENCOUNTER
The prescription was discontinued due to SI not because of BP issues. She has an appt with Psych on 3/27. They need to be the ones to write the Rx.      [facility] into [facility] still sick

## 2023-03-20 NOTE — TELEPHONE ENCOUNTER
Spoke with pt she is looking for something to replace the Prozac she was taking they took it from her when she was in pt 3/6/23. She is also asking about getting another antibiotic as her sinus infection isn't cleared up after finishing Zpak.

## 2023-03-20 NOTE — TELEPHONE ENCOUNTER
----- Message from Veronika Contreras sent at 3/20/2023  9:50 AM EDT -----  Subject: Medication Problem    Medication: Other - Prozac (Fluoxetine)  Dosage: 20 mg 3 month supply (took 20 mg three times a day)  Ordering Provider: Rosy Dumont (Psychiatrist in [address])    Question/Problem: Yue Medrano was given a 3 month supply of Prozac   from Dr. Elizabeth Tilley in [place] until he cancelled her   prescription. The medication was known to elevate her blood pressure and   was told to cut off the medication completely on 03/09. Then took the   medication again on 03/19 and became jittery. Needs an alternative.       Pharmacy: [address] [place] [address],[facility] Floor [phone] Rogelio Holm [phone]    ---------------------------------------------------------------------------  --------------  Vladimir KIDD  [phone]; OK to leave message on voicemail  ---------------------------------------------------------------------------  --------------    SCRIPT ANSWERS  Relationship to Patient: Self

## 2023-03-22 ENCOUNTER — OFFICE VISIT (OUTPATIENT)
Dept: FAMILY MEDICINE CLINIC | Age: 53
End: 2023-03-22
Payer: COMMERCIAL

## 2023-03-22 VITALS
HEART RATE: 97 BPM | TEMPERATURE: 98.2 F | DIASTOLIC BLOOD PRESSURE: 82 MMHG | SYSTOLIC BLOOD PRESSURE: 126 MMHG | OXYGEN SATURATION: 98 % | BODY MASS INDEX: 25.95 KG/M2 | WEIGHT: 148.8 LBS

## 2023-03-22 DIAGNOSIS — J01.90 ACUTE NON-RECURRENT SINUSITIS, UNSPECIFIED LOCATION: Primary | ICD-10-CM

## 2023-03-22 DIAGNOSIS — R09.81 NASAL CONGESTION: ICD-10-CM

## 2023-03-22 PROCEDURE — 3074F SYST BP LT 130 MM HG: CPT | Performed by: PHYSICIAN ASSISTANT

## 2023-03-22 PROCEDURE — G8482 FLU IMMUNIZE ORDER/ADMIN: HCPCS | Performed by: PHYSICIAN ASSISTANT

## 2023-03-22 PROCEDURE — 3079F DIAST BP 80-89 MM HG: CPT | Performed by: PHYSICIAN ASSISTANT

## 2023-03-22 PROCEDURE — 3017F COLORECTAL CA SCREEN DOC REV: CPT | Performed by: PHYSICIAN ASSISTANT

## 2023-03-22 PROCEDURE — 99213 OFFICE O/P EST LOW 20 MIN: CPT | Performed by: PHYSICIAN ASSISTANT

## 2023-03-22 PROCEDURE — G8419 CALC BMI OUT NRM PARAM NOF/U: HCPCS | Performed by: PHYSICIAN ASSISTANT

## 2023-03-22 PROCEDURE — 1036F TOBACCO NON-USER: CPT | Performed by: PHYSICIAN ASSISTANT

## 2023-03-22 PROCEDURE — G8427 DOCREV CUR MEDS BY ELIG CLIN: HCPCS | Performed by: PHYSICIAN ASSISTANT

## 2023-03-22 PROCEDURE — 1111F DSCHRG MED/CURRENT MED MERGE: CPT | Performed by: PHYSICIAN ASSISTANT

## 2023-03-22 RX ORDER — CEFUROXIME AXETIL 500 MG/1
500 TABLET ORAL 2 TIMES DAILY
Qty: 14 TABLET | Refills: 0 | Status: SHIPPED | OUTPATIENT
Start: 2023-03-22 | End: 2023-03-29

## 2023-03-22 NOTE — PROGRESS NOTES
Exam  Nurse's notes and vital signs reviewed. The patient is not hypoxic. ? General: Alert, no acute distress, patient resting comfortably Patient is not toxic or lethargic. Skin: Warm, intact, no pallor noted. There is no evidence of rash at this time. Head: Normocephalic, atraumatic  Eye: Normal conjunctiva  Ears, Nose, Throat: Right tympanic membrane clear, left tympanic membrane clear. No drainage or discharge noted. No pre- or post-auricular tenderness, erythema, or swelling noted. Minimal congestion, rhinorrhea, no epistaxis  Posterior oropharynx shows erythema but no evidence of tonsillar hypertrophy, or exudate. the uvula is midline. No trismus or drooling is noted. Moist mucous membranes. Cardiovascular: Regular Rate and Rhythm  Respiratory: No acute distress, no rhonchi, wheezing or crackles noted. No stridor or retractions are noted. Neurological: A&O x4, normal speech  Psychiatric: Cooperative       Testing:           Medical Decision Making:     Vital signs reviewed    Past medical history reviewed. Allergies reviewed. Medications reviewed. Patient on arrival does not appear to be in any apparent distress or discomfort. The patient has been seen and evaluated. The patient does not appear to be toxic or lethargic. The patient will be started on cefuroxime. The patient may use over-the-counter decongestant, nasal spray. Continue with Singulair. The patient states that she has a history of anxiety and is awaiting evaluation by psychiatrist.    The patient states that she had old lorazepam that she started taking 0.5 mg that is helping. They had taken her off of her Prozac because it was causing worsening symptoms. I would have the patient called today to see if she can be seen in the next 1 to 2 days to discuss medication. We will defer all treatment of her anxiety to psychiatry.     The patient [facility] to [facility] to [facility] or go directly to the emergency department

## 2023-03-23 ENCOUNTER — TELEPHONE (OUTPATIENT)
Dept: CHIROPRACTIC MEDICINE | Age: 53
End: 2023-03-23

## 2023-03-23 ENCOUNTER — OFFICE VISIT (OUTPATIENT)
Dept: ORTHOPEDIC SURGERY | Age: 53
End: 2023-03-23

## 2023-03-23 VITALS — BODY MASS INDEX: 25.27 KG/M2 | WEIGHT: 148 LBS | HEIGHT: 64 IN

## 2023-03-23 DIAGNOSIS — G89.29 CHRONIC LEFT SI JOINT PAIN: Primary | ICD-10-CM

## 2023-03-23 DIAGNOSIS — M53.3 CHRONIC LEFT SI JOINT PAIN: Primary | ICD-10-CM

## 2023-03-23 RX ORDER — TRIAMCINOLONE ACETONIDE 40 MG/ML
40 INJECTION, SUSPENSION INTRA-ARTICULAR; INTRAMUSCULAR ONCE
Status: COMPLETED | OUTPATIENT
Start: 2023-03-23 | End: 2023-03-23

## 2023-03-23 RX ORDER — LIDOCAINE HYDROCHLORIDE 10 MG/ML
5 INJECTION, SOLUTION INFILTRATION; PERINEURAL ONCE
Status: COMPLETED | OUTPATIENT
Start: 2023-03-23 | End: 2023-03-23

## 2023-03-23 RX ADMIN — TRIAMCINOLONE ACETONIDE 40 MG: 40 INJECTION, SUSPENSION INTRA-ARTICULAR; INTRAMUSCULAR at 16:13

## 2023-03-23 RX ADMIN — LIDOCAINE HYDROCHLORIDE 5 ML: 10 INJECTION, SOLUTION INFILTRATION; PERINEURAL at 16:13

## 2023-03-23 NOTE — TELEPHONE ENCOUNTER
----- Message from Bess Brittni sent at 3/23/2023 10:33 AM EDT -----  Subject: Message to Provider    QUESTIONS  Information for Provider? patient sates that she is was on Prozac and   taking off after being admitted to the psyche war and was given Ativan,   and has an appointment with a counselor on 23 and the Ativan is    and wants to know what she can take from now until then   ---------------------------------------------------------------------------  --------------  [address]  [phone]; OK to leave message on voicemail  ---------------------------------------------------------------------------  --------------  SCRIPT ANSWERS  Relationship to Patient?  Self

## 2023-03-23 NOTE — PROGRESS NOTES
PROCEDURE NOTE:    DIAGNOSIS      LEFT sacroiliac joint pain    PROCEDURE     Ultrasound-guided LEFT sacroiliac joint corticosteroid injection. PROCEDURAL PAUSE     Procedural pause conducted to verify: ?correct patient identity, procedure to be performed, and as applicable, correct side and site, correct patient position, and availability of implants, special equipment, or special requirements. PROCEDURE DETAILS     The procedure was carried out under sterile technique. Patient Position: ?Prone. Localization Process: ? The sacroiliac joint was evaluated under ultrasound prior to starting the procedure. ? The skin was prepped with Betadine and Alcohol. Approach: ?Out-of-plane. Local Anesthesia: Under live ultrasound guidance, local anesthesia was obtained with vapocoolant cold spray and 2 cc of 1% lidocaine using a 25-gauge 2-inch needle advanced from an out-of-plane approach to the sacroiliac joint. ?     Injection/Aspiration: ?A 22-gauge 3-1/2-inch needle was advanced from an out-of-plane approach into the sacroiliac joint. ? After visualization of the needle tip in the target area and negative aspiration for blood, a mixture of 3 cc of 1% lidocaine and 1 cc of Kenalog (40 mg/cc) was injected into the sacroiliac joint with excellent sonographic flow. Images of procedure were permanently recorded. Postprocedure Care: ? The patient will avoid soaking the injection site under water for two days and avoid heavy exertion with the hip. ?The patient will contact me with any problems related to the injection. PATIENT EDUCATION     Ready to learn, no apparent learning barriers were identified; learning preferences include listening. ? Explained diagnosis and treatment plan; patient expressed understanding of the content.      INFORMED CONSENT     Following denial of allergy and review of potential side effects and complications including but not necessarily limited to infection, allergic reaction,

## 2023-03-23 NOTE — TELEPHONE ENCOUNTER
----- Message from Saravanan Clinton sent at 3/23/2023 10:33 AM EDT -----  Subject: Message to Provider    QUESTIONS  Information for Provider? patient sates that she is was on Prozac and   taking off after being admitted to the psyche war and was given Ativan,   and has an appointment with a counselor on 23 and the Ativan is    and wants to know what she can take from now until then   ---------------------------------------------------------------------------  --------------  [address]  [phone]; OK to leave message on voicemail  ---------------------------------------------------------------------------  --------------  SCRIPT ANSWERS  Relationship to Patient?  Self

## 2023-03-23 NOTE — TELEPHONE ENCOUNTER
Pt was advised on 3/20/23 that she needed to seek meds from Psych. She came into [facility] and ask if she could take the  Ativan she was advised against it. She is calling again today to see what she should do until her Psych appt.

## 2023-03-23 NOTE — TELEPHONE ENCOUNTER
Reviewed records/chart from [facility]. She was not given any Ativan during admission nor a prescription on discharge. Not sure what she is talking about.   I can prescribe Vistaril if she feels that she needs something for the next week

## 2023-03-24 RX ORDER — HYDROXYZINE PAMOATE 25 MG/1
25 CAPSULE ORAL 3 TIMES DAILY PRN
Qty: 90 CAPSULE | Refills: 0 | Status: SHIPPED | OUTPATIENT
Start: 2023-03-24 | End: 2023-04-23

## 2023-03-30 ENCOUNTER — OFFICE VISIT (OUTPATIENT)
Dept: FAMILY MEDICINE CLINIC | Age: 53
End: 2023-03-30
Payer: COMMERCIAL

## 2023-03-30 VITALS
DIASTOLIC BLOOD PRESSURE: 84 MMHG | RESPIRATION RATE: 18 BRPM | SYSTOLIC BLOOD PRESSURE: 126 MMHG | OXYGEN SATURATION: 98 % | WEIGHT: 148 LBS | TEMPERATURE: 97.8 F | HEART RATE: 100 BPM | HEIGHT: 64 IN | BODY MASS INDEX: 25.27 KG/M2

## 2023-03-30 DIAGNOSIS — R09.82 POSTNASAL DRIP: ICD-10-CM

## 2023-03-30 DIAGNOSIS — R09.81 NASAL CONGESTION: ICD-10-CM

## 2023-03-30 DIAGNOSIS — J01.90 ACUTE NON-RECURRENT SINUSITIS, UNSPECIFIED LOCATION: Primary | ICD-10-CM

## 2023-03-30 PROCEDURE — 3074F SYST BP LT 130 MM HG: CPT | Performed by: PHYSICIAN ASSISTANT

## 2023-03-30 PROCEDURE — 1036F TOBACCO NON-USER: CPT | Performed by: PHYSICIAN ASSISTANT

## 2023-03-30 PROCEDURE — 1111F DSCHRG MED/CURRENT MED MERGE: CPT | Performed by: PHYSICIAN ASSISTANT

## 2023-03-30 PROCEDURE — 99213 OFFICE O/P EST LOW 20 MIN: CPT | Performed by: PHYSICIAN ASSISTANT

## 2023-03-30 PROCEDURE — G8419 CALC BMI OUT NRM PARAM NOF/U: HCPCS | Performed by: PHYSICIAN ASSISTANT

## 2023-03-30 PROCEDURE — G8482 FLU IMMUNIZE ORDER/ADMIN: HCPCS | Performed by: PHYSICIAN ASSISTANT

## 2023-03-30 PROCEDURE — 3017F COLORECTAL CA SCREEN DOC REV: CPT | Performed by: PHYSICIAN ASSISTANT

## 2023-03-30 PROCEDURE — 3079F DIAST BP 80-89 MM HG: CPT | Performed by: PHYSICIAN ASSISTANT

## 2023-03-30 PROCEDURE — G8427 DOCREV CUR MEDS BY ELIG CLIN: HCPCS | Performed by: PHYSICIAN ASSISTANT

## 2023-03-30 RX ORDER — AMOXICILLIN AND CLAVULANATE POTASSIUM 875; 125 MG/1; MG/1
1 TABLET, FILM COATED ORAL 2 TIMES DAILY
Qty: 14 TABLET | Refills: 0 | Status: SHIPPED | OUTPATIENT
Start: 2023-03-30 | End: 2023-04-06

## 2023-03-30 RX ORDER — CHLORPHENIRAMINE MALEATE 4 MG/1
4 TABLET ORAL EVERY 6 HOURS PRN
Qty: 20 TABLET | Refills: 0 | Status: SHIPPED | OUTPATIENT
Start: 2023-03-30

## 2023-03-30 NOTE — PROGRESS NOTES
Reactions    Prednisone        Social History:     Social History     Tobacco Use    Smoking status: Never    Smokeless tobacco: Never   Vaping Use    Vaping Use: Never used   Substance Use Topics    Alcohol use: No    Drug use: No       Patient lives at home. Physical Exam:     Vitals:    03/30/23 1308   BP: 126/84   Site: Right Upper Arm   Position: Sitting   Cuff Size: Medium Adult   Pulse: 100   Resp: 18   Temp: 97.8 °F (36.6 °C)   TempSrc: Temporal   SpO2: 98%   Weight: 148 lb (67.1 kg)   Height: 5' 3.5\" (1.613 m)       Exam:  Physical Exam  Nurse's notes and vital signs reviewed. The patient is not hypoxic. ? General: Alert, no acute distress, patient resting comfortably Patient is not toxic or lethargic. Skin: Warm, intact, no pallor noted. There is no evidence of rash at this time. Head: Normocephalic, atraumatic  Eye: Normal conjunctiva  Ears, Nose, Throat: Right tympanic membrane clear, left tympanic membrane clear. No drainage or discharge noted. No pre- or post-auricular tenderness, erythema, or swelling noted. Nasal congestion, rhinorrhea, clear drainage  Posterior oropharynx shows erythema but no evidence of tonsillar hypertrophy, or exudate. the uvula is midline. No trismus or drooling is noted. Moist mucous membranes. Neck: No anterior/posterior lymphadenopathy noted. No erythema, no masses, no fluctuance or induration noted. No meningeal signs. Cardiovascular: Regular Rate and Rhythm  Respiratory: No acute distress, no rhonchi, wheezing or crackles noted. No stridor or retractions are noted. Neurological: A&O x4, normal speech  Psychiatric: Cooperative       Testing:           Medical Decision Making:     Vital signs reviewed    Past medical history reviewed. Allergies reviewed. Medications reviewed. Patient on arrival does not appear to be in any apparent distress or discomfort. The patient has been seen and evaluated. The patient does not appear to be toxic or lethargic.

## 2023-04-10 ENCOUNTER — HOSPITAL ENCOUNTER (INPATIENT)
Age: 53
LOS: 3 days | Discharge: HOME | DRG: 884 | End: 2023-04-13
Attending: STUDENT IN AN ORGANIZED HEALTH CARE EDUCATION/TRAINING PROGRAM | Admitting: PSYCHIATRY & NEUROLOGY
Payer: COMMERCIAL

## 2023-04-10 DIAGNOSIS — R45.851 SUICIDAL IDEATIONS: Primary | ICD-10-CM

## 2023-04-10 PROBLEM — F32.A DEPRESSION WITH SUICIDAL IDEATION: Status: ACTIVE | Noted: 2023-04-10

## 2023-04-10 LAB
ALBUMIN SERPL-MCNC: 4 G/DL (ref 3.5–5.2)
ALP SERPL-CCNC: 46 U/L (ref 35–104)
ALT SERPL-CCNC: 17 U/L (ref 0–32)
AMPHET UR QL SCN: NOT DETECTED
ANION GAP SERPL CALCULATED.3IONS-SCNC: 10 MMOL/L (ref 7–16)
APAP SERPL-MCNC: <5 MCG/ML (ref 10–30)
AST SERPL-CCNC: 18 U/L (ref 0–31)
BACTERIA URNS QL MICRO: ABNORMAL /HPF
BARBITURATES UR QL SCN: NOT DETECTED
BASOPHILS # BLD: 0.05 E9/L (ref 0–0.2)
BASOPHILS NFR BLD: 0.6 % (ref 0–2)
BENZODIAZ UR QL SCN: NOT DETECTED
BILIRUB SERPL-MCNC: 0.3 MG/DL (ref 0–1.2)
BILIRUB UR QL STRIP: NEGATIVE
BUN SERPL-MCNC: 11 MG/DL (ref 6–20)
CALCIUM SERPL-MCNC: 9.3 MG/DL (ref 8.6–10.2)
CANNABINOIDS UR QL SCN: NOT DETECTED
CHLORIDE SERPL-SCNC: 105 MMOL/L (ref 98–107)
CLARITY UR: CLEAR
CO2 SERPL-SCNC: 24 MMOL/L (ref 22–29)
COCAINE UR QL SCN: NOT DETECTED
COLOR UR: YELLOW
CREAT SERPL-MCNC: 0.7 MG/DL (ref 0.5–1)
DRUG SCREEN COMMENT UR-IMP: NORMAL
EOSINOPHIL # BLD: 0.01 E9/L (ref 0.05–0.5)
EOSINOPHIL NFR BLD: 0.1 % (ref 0–6)
ERYTHROCYTE [DISTWIDTH] IN BLOOD BY AUTOMATED COUNT: 12.7 FL (ref 11.5–15)
ETHANOLAMINE SERPL-MCNC: <10 MG/DL (ref 0–0.08)
FENTANYL SCREEN, URINE: NOT DETECTED
FLUAV RNA RESP QL NAA+PROBE: NOT DETECTED
FLUBV RNA RESP QL NAA+PROBE: NOT DETECTED
GLUCOSE SERPL-MCNC: 95 MG/DL (ref 74–99)
GLUCOSE UR STRIP-MCNC: NEGATIVE MG/DL
HCG, URINE, POC: NEGATIVE
HCT VFR BLD AUTO: 47.3 % (ref 34–48)
HGB BLD-MCNC: 15.2 G/DL (ref 11.5–15.5)
HGB UR QL STRIP: ABNORMAL
IMM GRANULOCYTES # BLD: 0.04 E9/L
IMM GRANULOCYTES NFR BLD: 0.5 % (ref 0–5)
KETONES UR STRIP-MCNC: NEGATIVE MG/DL
LEUKOCYTE ESTERASE UR QL STRIP: NEGATIVE
LYMPHOCYTES # BLD: 1.86 E9/L (ref 1.5–4)
LYMPHOCYTES NFR BLD: 21.5 % (ref 20–42)
Lab: NORMAL
MCH RBC QN AUTO: 29.6 PG (ref 26–35)
MCHC RBC AUTO-ENTMCNC: 32.1 % (ref 32–34.5)
MCV RBC AUTO: 92.2 FL (ref 80–99.9)
METHADONE UR QL SCN: NOT DETECTED
MONOCYTES # BLD: 0.39 E9/L (ref 0.1–0.95)
MONOCYTES NFR BLD: 4.5 % (ref 2–12)
NEGATIVE QC PASS/FAIL: NORMAL
NEUTROPHILS # BLD: 6.3 E9/L (ref 1.8–7.3)
NEUTS SEG NFR BLD: 72.8 % (ref 43–80)
NITRITE UR QL STRIP: NEGATIVE
OPIATES UR QL SCN: NOT DETECTED
OXYCODONE URINE: NOT DETECTED
PCP UR QL SCN: NOT DETECTED
PH UR STRIP: 7.5 [PH] (ref 5–9)
PLATELET # BLD AUTO: 255 E9/L (ref 130–450)
PMV BLD AUTO: 8.8 FL (ref 7–12)
POSITIVE QC PASS/FAIL: NORMAL
POTASSIUM SERPL-SCNC: 4 MMOL/L (ref 3.5–5)
PROT SERPL-MCNC: 6.9 G/DL (ref 6.4–8.3)
PROT UR STRIP-MCNC: NEGATIVE MG/DL
RBC # BLD AUTO: 5.13 E12/L (ref 3.5–5.5)
RBC #/AREA URNS HPF: ABNORMAL /HPF (ref 0–2)
SALICYLATES SERPL-MCNC: <0.3 MG/DL (ref 0–30)
SARS-COV-2 RNA RESP QL NAA+PROBE: NOT DETECTED
SODIUM SERPL-SCNC: 139 MMOL/L (ref 132–146)
SP GR UR STRIP: 1.01 (ref 1–1.03)
TRICYCLIC ANTIDEPRESSANTS SCREEN SERUM: NEGATIVE NG/ML
UROBILINOGEN UR STRIP-ACNC: 0.2 E.U./DL
WBC # BLD: 8.7 E9/L (ref 4.5–11.5)
WBC #/AREA URNS HPF: ABNORMAL /HPF (ref 0–5)

## 2023-04-10 PROCEDURE — 81001 URINALYSIS AUTO W/SCOPE: CPT

## 2023-04-10 PROCEDURE — 99285 EMERGENCY DEPT VISIT HI MDM: CPT

## 2023-04-10 PROCEDURE — 80053 COMPREHEN METABOLIC PANEL: CPT

## 2023-04-10 PROCEDURE — 85025 COMPLETE CBC W/AUTO DIFF WBC: CPT

## 2023-04-10 PROCEDURE — 87636 SARSCOV2 & INF A&B AMP PRB: CPT

## 2023-04-10 PROCEDURE — 80307 DRUG TEST PRSMV CHEM ANLYZR: CPT

## 2023-04-10 PROCEDURE — 93005 ELECTROCARDIOGRAM TRACING: CPT | Performed by: STUDENT IN AN ORGANIZED HEALTH CARE EDUCATION/TRAINING PROGRAM

## 2023-04-10 PROCEDURE — 80143 DRUG ASSAY ACETAMINOPHEN: CPT

## 2023-04-10 PROCEDURE — 80179 DRUG ASSAY SALICYLATE: CPT

## 2023-04-10 PROCEDURE — 82077 ASSAY SPEC XCP UR&BREATH IA: CPT

## 2023-04-10 PROCEDURE — 1240000000 HC EMOTIONAL WELLNESS R&B

## 2023-04-10 RX ORDER — HALOPERIDOL 5 MG/ML
5 INJECTION INTRAMUSCULAR EVERY 6 HOURS PRN
Status: DISCONTINUED | OUTPATIENT
Start: 2023-04-10 | End: 2023-04-10

## 2023-04-10 RX ORDER — HYDROXYZINE HYDROCHLORIDE 10 MG/1
50 TABLET, FILM COATED ORAL 3 TIMES DAILY PRN
Status: DISCONTINUED | OUTPATIENT
Start: 2023-04-10 | End: 2023-04-10 | Stop reason: CLARIF

## 2023-04-10 RX ORDER — HYDROXYZINE PAMOATE 25 MG/1
50 CAPSULE ORAL 3 TIMES DAILY PRN
Status: DISCONTINUED | OUTPATIENT
Start: 2023-04-10 | End: 2023-04-13 | Stop reason: HOSPADM

## 2023-04-10 RX ORDER — NICOTINE 21 MG/24HR
1 PATCH, TRANSDERMAL 24 HOURS TRANSDERMAL DAILY
Status: DISCONTINUED | OUTPATIENT
Start: 2023-04-11 | End: 2023-04-11

## 2023-04-10 RX ORDER — MAGNESIUM HYDROXIDE/ALUMINUM HYDROXICE/SIMETHICONE 120; 1200; 1200 MG/30ML; MG/30ML; MG/30ML
30 SUSPENSION ORAL PRN
Status: DISCONTINUED | OUTPATIENT
Start: 2023-04-10 | End: 2023-04-13 | Stop reason: HOSPADM

## 2023-04-10 RX ORDER — ACETAMINOPHEN 325 MG/1
650 TABLET ORAL EVERY 6 HOURS PRN
Status: DISCONTINUED | OUTPATIENT
Start: 2023-04-10 | End: 2023-04-13 | Stop reason: HOSPADM

## 2023-04-10 RX ORDER — HALOPERIDOL 5 MG/ML
5 INJECTION INTRAMUSCULAR EVERY 6 HOURS PRN
Status: DISCONTINUED | OUTPATIENT
Start: 2023-04-10 | End: 2023-04-13 | Stop reason: HOSPADM

## 2023-04-10 RX ORDER — MAGNESIUM HYDROXIDE/ALUMINUM HYDROXICE/SIMETHICONE 120; 1200; 1200 MG/30ML; MG/30ML; MG/30ML
30 SUSPENSION ORAL PRN
Status: DISCONTINUED | OUTPATIENT
Start: 2023-04-10 | End: 2023-04-10

## 2023-04-10 RX ORDER — LANOLIN ALCOHOL/MO/W.PET/CERES
3 CREAM (GRAM) TOPICAL NIGHTLY
Status: DISCONTINUED | OUTPATIENT
Start: 2023-04-10 | End: 2023-04-13 | Stop reason: HOSPADM

## 2023-04-10 RX ORDER — ACETAMINOPHEN 325 MG/1
650 TABLET ORAL EVERY 6 HOURS PRN
Status: DISCONTINUED | OUTPATIENT
Start: 2023-04-10 | End: 2023-04-10

## 2023-04-10 RX ORDER — HALOPERIDOL 5 MG/1
5 TABLET ORAL EVERY 6 HOURS PRN
Status: DISCONTINUED | OUTPATIENT
Start: 2023-04-10 | End: 2023-04-10

## 2023-04-10 RX ORDER — LANOLIN ALCOHOL/MO/W.PET/CERES
3 CREAM (GRAM) TOPICAL NIGHTLY
Status: DISCONTINUED | OUTPATIENT
Start: 2023-04-10 | End: 2023-04-10

## 2023-04-10 RX ORDER — HYDROXYZINE PAMOATE 50 MG/1
50 CAPSULE ORAL 3 TIMES DAILY PRN
Status: DISCONTINUED | OUTPATIENT
Start: 2023-04-10 | End: 2023-04-10

## 2023-04-10 RX ORDER — HALOPERIDOL 5 MG/1
5 TABLET ORAL EVERY 6 HOURS PRN
Status: DISCONTINUED | OUTPATIENT
Start: 2023-04-10 | End: 2023-04-13 | Stop reason: HOSPADM

## 2023-04-10 RX ORDER — NICOTINE 21 MG/24HR
1 PATCH, TRANSDERMAL 24 HOURS TRANSDERMAL DAILY
Status: DISCONTINUED | OUTPATIENT
Start: 2023-04-11 | End: 2023-04-10

## 2023-04-10 ASSESSMENT — LIFESTYLE VARIABLES: HOW OFTEN DO YOU HAVE A DRINK CONTAINING ALCOHOL: NEVER

## 2023-04-10 ASSESSMENT — PAIN - FUNCTIONAL ASSESSMENT: PAIN_FUNCTIONAL_ASSESSMENT: NONE - DENIES PAIN

## 2023-04-10 NOTE — ED NOTES
Behavioral Health Crisis Assessment      Chief Complaint: The pt was sent in by her  due to reported SI. Mental Status Exam: The pt presents calm and cooperative with a flat affect and congruent mood. She is oriented times 4 and is a good historian. She denied a hx of AVH. She has poor judgement and insight. She has fair eye contact and good hygiene. Legal Status:  [x] Voluntary:  [] Involuntary, Issued by:    Gender:  [] Male [x] Female [] Transgender  [] Other    Sexual Orientation:  [x] Heterosexual [] Homosexual [] Bisexual [] Other    Brief Clinical Summary:  The pt stated that she is in the process of moving into a duplex and her mom was supposed to help financially. She stated that her mom changed her mind after she signed the lease. She stated she paid out 2500 and the rent is $1300. She stated that she takes home $1200. She stated that she now has to live with her mom and she really does not want to do this. She stated that she got frustrated b/c there is 45,000 in an acct that mom was going to use to help with her rent. She stated that she is now frustrated that she has to change her plans. The pt stated that a couple days ago she started to have SI with no specific plan. Last mo she was admitted to [address] [place] [address] due to [facility] with a plan to jump off a bridge. She stated that she started to feel really bad today and she was talking to her  from [employer] and she suggested that she come to the ED. She stated that her  then called the police and they called EMS who brought her here. She stated that she has calmed down a bit she has called her  from the ER who stated that she is going to help her get her money back for the apartment due to her disability. Call to the  who stated that the pt had told her that she was upset and that she wanted to kill herself.   The  asked if she had a plan and the pt stated that she had 2-90 count

## 2023-04-10 NOTE — ED PROVIDER NOTES
[address]        Pt Name: Maura Gill  MRN: [id number]  [place] 1970  Date of evaluation: 4/10/2023  Provider: Willy Aguiar DO  PCP: Fer Alfaro DO  Note Started: 2:55 PM EDT 4/10/23    CHIEF COMPLAINT       Chief Complaint   Patient presents with    Psychiatric Evaluation     States she had some feelings of SI today denies HI . States he psych meds were changed recently and she believe that is what caused her to feel this way       HISTORY OF PRESENT ILLNESS: 1 or more Elements   History From: patient    Limitations to history : None    Maura Gill is a 50 y.o. female with a history of autism, personality disorder, generalized anxiety, depression who presents to the emergency department via EMS from home for psychiatric evaluation and suicidal ideations. The patient has a  and  that she follows up with. The patient states that she had some feelings of suicidal ideations today because she was having some trouble with her landlord. She did tell her  that she had 2 bottles of Prozac that she was planning to overdose on because she felt sad. The patient currently denies any suicidal ideations. Denies any visual hallucinations, auditory hallucinations, homicidal ideations, or other acute symptoms or concerns. Nursing Notes were all reviewed and agreed with or any disagreements were addressed in the HPI. REVIEW OF SYSTEMS :      Review of Systems    Positives and Pertinent negatives as per HPI.      SURGICAL HISTORY     Past Surgical History:   Procedure Laterality Date    COLONOSCOPY  04/11/2016    LITHOTRIPSY  2019    UPPER GASTROINTESTINAL ENDOSCOPY  04/11/2016    WISDOM TOOTH EXTRACTION         CURRENTMEDICATIONS       Previous Medications    AZELASTINE (ASTELIN) 137 MCG/SPRAY NASAL SPRAY    1 spray by Nasal route 2 times daily Use in each

## 2023-04-11 PROBLEM — F32.A DEPRESSION WITH SUICIDAL IDEATION: Status: RESOLVED | Noted: 2023-04-10 | Resolved: 2023-04-11

## 2023-04-11 PROBLEM — R45.851 DEPRESSION WITH SUICIDAL IDEATION: Status: RESOLVED | Noted: 2023-04-10 | Resolved: 2023-04-11

## 2023-04-11 LAB
EKG ATRIAL RATE: 99 BPM
EKG P AXIS: 66 DEGREES
EKG P-R INTERVAL: 162 MS
EKG Q-T INTERVAL: 368 MS
EKG QRS DURATION: 92 MS
EKG QTC CALCULATION (BAZETT): 472 MS
EKG R AXIS: 52 DEGREES
EKG T AXIS: 75 DEGREES
EKG VENTRICULAR RATE: 99 BPM

## 2023-04-11 PROCEDURE — 1240000000 HC EMOTIONAL WELLNESS R&B

## 2023-04-11 PROCEDURE — 6360000002 HC RX W HCPCS: Performed by: NURSE PRACTITIONER

## 2023-04-11 PROCEDURE — 93010 ELECTROCARDIOGRAM REPORT: CPT | Performed by: INTERNAL MEDICINE

## 2023-04-11 PROCEDURE — 94664 DEMO&/EVAL PT USE INHALER: CPT

## 2023-04-11 PROCEDURE — 94640 AIRWAY INHALATION TREATMENT: CPT

## 2023-04-11 PROCEDURE — 6370000000 HC RX 637 (ALT 250 FOR IP): Performed by: NURSE PRACTITIONER

## 2023-04-11 PROCEDURE — 6370000000 HC RX 637 (ALT 250 FOR IP): Performed by: PSYCHIATRY & NEUROLOGY

## 2023-04-11 RX ORDER — CETIRIZINE HYDROCHLORIDE 10 MG/1
10 TABLET ORAL DAILY
Status: DISCONTINUED | OUTPATIENT
Start: 2023-04-11 | End: 2023-04-13 | Stop reason: HOSPADM

## 2023-04-11 RX ORDER — BUDESONIDE AND FORMOTEROL FUMARATE DIHYDRATE 160; 4.5 UG/1; UG/1
1 AEROSOL RESPIRATORY (INHALATION) 2 TIMES DAILY
Status: DISCONTINUED | OUTPATIENT
Start: 2023-04-11 | End: 2023-04-11 | Stop reason: CLARIF

## 2023-04-11 RX ORDER — LEVOTHYROXINE SODIUM 0.1 MG/1
100 TABLET ORAL DAILY
Status: DISCONTINUED | OUTPATIENT
Start: 2023-04-11 | End: 2023-04-13 | Stop reason: HOSPADM

## 2023-04-11 RX ORDER — FLUTICASONE PROPIONATE 50 MCG
1 SPRAY, SUSPENSION (ML) NASAL DAILY
Status: DISCONTINUED | OUTPATIENT
Start: 2023-04-11 | End: 2023-04-13 | Stop reason: HOSPADM

## 2023-04-11 RX ORDER — LISINOPRIL 20 MG/1
10 TABLET ORAL DAILY
Status: DISCONTINUED | OUTPATIENT
Start: 2023-04-11 | End: 2023-04-13 | Stop reason: HOSPADM

## 2023-04-11 RX ORDER — BUDESONIDE 0.5 MG/2ML
0.5 INHALANT ORAL 2 TIMES DAILY
Status: DISCONTINUED | OUTPATIENT
Start: 2023-04-11 | End: 2023-04-13 | Stop reason: HOSPADM

## 2023-04-11 RX ORDER — MONTELUKAST SODIUM 10 MG/1
10 TABLET ORAL NIGHTLY
Status: DISCONTINUED | OUTPATIENT
Start: 2023-04-11 | End: 2023-04-13 | Stop reason: HOSPADM

## 2023-04-11 RX ORDER — CLARITHROMYCIN 250 MG/1
250 TABLET, FILM COATED ORAL DAILY
Status: DISCONTINUED | OUTPATIENT
Start: 2023-04-11 | End: 2023-04-13 | Stop reason: HOSPADM

## 2023-04-11 RX ORDER — ARFORMOTEROL TARTRATE 15 UG/2ML
15 SOLUTION RESPIRATORY (INHALATION) 2 TIMES DAILY
Status: DISCONTINUED | OUTPATIENT
Start: 2023-04-11 | End: 2023-04-13 | Stop reason: HOSPADM

## 2023-04-11 RX ORDER — OLANZAPINE 5 MG/1
5 TABLET ORAL NIGHTLY
Status: DISCONTINUED | OUTPATIENT
Start: 2023-04-12 | End: 2023-04-13 | Stop reason: HOSPADM

## 2023-04-11 RX ORDER — OLANZAPINE 2.5 MG/1
2.5 TABLET ORAL NIGHTLY
Status: COMPLETED | OUTPATIENT
Start: 2023-04-11 | End: 2023-04-11

## 2023-04-11 RX ORDER — NORETHINDRONE ACETATE AND ETHINYL ESTRADIOL 1; .02 MG/1; MG/1
1 TABLET ORAL DAILY
Status: DISCONTINUED | OUTPATIENT
Start: 2023-04-12 | End: 2023-04-13 | Stop reason: HOSPADM

## 2023-04-11 RX ADMIN — HYDROXYZINE PAMOATE 50 MG: 50 CAPSULE ORAL at 00:43

## 2023-04-11 RX ADMIN — FLUTICASONE PROPIONATE 1 SPRAY: 50 SPRAY, METERED NASAL at 17:03

## 2023-04-11 RX ADMIN — LEVOTHYROXINE SODIUM 100 MCG: 0.1 TABLET ORAL at 10:47

## 2023-04-11 RX ADMIN — MONTELUKAST 10 MG: 10 TABLET, FILM COATED ORAL at 21:34

## 2023-04-11 RX ADMIN — CLARITHROMYCIN 250 MG: 250 TABLET ORAL at 17:01

## 2023-04-11 RX ADMIN — HYDROXYZINE PAMOATE 50 MG: 50 CAPSULE ORAL at 21:35

## 2023-04-11 RX ADMIN — MELATONIN 3 MG ORAL TABLET 3 MG: 3 TABLET ORAL at 00:42

## 2023-04-11 RX ADMIN — ACETAMINOPHEN 650 MG: 325 TABLET ORAL at 15:32

## 2023-04-11 RX ADMIN — BUDESONIDE 500 MCG: 0.5 SUSPENSION RESPIRATORY (INHALATION) at 20:55

## 2023-04-11 RX ADMIN — OLANZAPINE 2.5 MG: 2.5 TABLET, FILM COATED ORAL at 21:34

## 2023-04-11 RX ADMIN — LISINOPRIL 10 MG: 20 TABLET ORAL at 17:00

## 2023-04-11 RX ADMIN — CETIRIZINE HYDROCHLORIDE 10 MG: 10 TABLET, FILM COATED ORAL at 17:01

## 2023-04-11 RX ADMIN — MELATONIN 3 MG ORAL TABLET 3 MG: 3 TABLET ORAL at 21:34

## 2023-04-11 RX ADMIN — ARFORMOTEROL TARTRATE 15 MCG: 15 SOLUTION RESPIRATORY (INHALATION) at 20:55

## 2023-04-11 ASSESSMENT — SLEEP AND FATIGUE QUESTIONNAIRES
AVERAGE NUMBER OF SLEEP HOURS: 8
SLEEP PATTERN: DIFFICULTY FALLING ASLEEP
DO YOU HAVE DIFFICULTY SLEEPING: NO
DO YOU HAVE DIFFICULTY SLEEPING: NO
DO YOU USE A SLEEP AID: YES
AVERAGE NUMBER OF SLEEP HOURS: 8

## 2023-04-11 ASSESSMENT — PAIN - FUNCTIONAL ASSESSMENT: PAIN_FUNCTIONAL_ASSESSMENT: ACTIVITIES ARE NOT PREVENTED

## 2023-04-11 ASSESSMENT — PATIENT HEALTH QUESTIONNAIRE - PHQ9: SUM OF ALL RESPONSES TO PHQ QUESTIONS 1-9: 3

## 2023-04-11 ASSESSMENT — PAIN SCALES - GENERAL: PAINLEVEL_OUTOF10: 3

## 2023-04-11 ASSESSMENT — PAIN DESCRIPTION - DESCRIPTORS: DESCRIPTORS: ACHING;SORE;DISCOMFORT

## 2023-04-11 ASSESSMENT — LIFESTYLE VARIABLES
HOW OFTEN DO YOU HAVE A DRINK CONTAINING ALCOHOL: NEVER
HOW MANY STANDARD DRINKS CONTAINING ALCOHOL DO YOU HAVE ON A TYPICAL DAY: PATIENT DOES NOT DRINK

## 2023-04-11 ASSESSMENT — PAIN DESCRIPTION - LOCATION: LOCATION: HEAD

## 2023-04-11 NOTE — BH NOTE
Pt denies suicidal / homicidal ideations. Pt denies hallucinations. Pt is cooperative, no behavioral concerns at this time. Will follow and monitor.

## 2023-04-11 NOTE — PLAN OF CARE
Problem: Anxiety  Goal: Will report anxiety at manageable levels  Description: INTERVENTIONS:  1. Administer medication as ordered  2. Teach and rehearse alternative coping skills  3. Provide emotional support with 1:1 interaction with staff  Outcome: Progressing  Flowsheets  Taken 4/11/2023 0858 by Skye Robbins RN  Will report anxiety at manageable levels: Administer medication as ordered  Taken 4/11/2023 0054 by Joyce Rojas RN  Will report anxiety at manageable levels:   Administer medication as ordered   Provide emotional support with 1:1 interaction with staff   Teach and rehearse alternative coping skills     Problem: Depression/Self Harm  Goal: Effect of psychiatric condition will be minimized and patient will be protected from self harm  Description: INTERVENTIONS:  1. Assess impact of patient's symptoms on level of functioning, self care needs and offer support as indicated  2. Assess patient/family knowledge of depression, impact on illness and need for teaching  3. Provide emotional support, presence and reassurance  4. Assess for possible suicidal thoughts or ideation. If patient expresses suicidal thoughts or statements do not leave alone, initiate Suicide Precautions, move to a room close to the nursing station and obtain sitter  5.  Initiate consults as appropriate with Mental Health Professional, Spiritual Care, Psychosocial CNS, and consider a recommendation to the LIP for a Psychiatric Consultation  Outcome: Progressing  Flowsheets  Taken 4/11/2023 0902 by Skye Robbins RN  Effect of psychiatric condition will be minimized and patient will be protected from self harm: Provide emotional support, presence and reassurance  Taken 4/11/2023 0858 by Skye Robbins RN  Effect of psychiatric condition will be minimized and patient will be protected from self harm: Assess impact of patients symptoms on level of functioning, self care needs and offer support as indicated  Taken 4/11/2023 0054 by

## 2023-04-11 NOTE — ED NOTES
Was asked to place consult for internal med for this patient but this RN cannot place these orders     Kaylan Alvares RN  04/10/23 2806

## 2023-04-11 NOTE — H&P
Department of Psychiatry  History and Physical - Adult     CHIEF COMPLAINT:      Patient was seen after discussing with the treatment team and reviewing the chart    CIRCUMSTANCES OF ADMISSION: presented to the ED sent in by her  for suicidal ideations with a plan to overdose on between 2-90 melatonin tablets    HISTORY OF PRESENT ILLNESS:      The patient is a 50 y.o. female with significant past history of autism, common variable immunodeficiency, hypertension  presented to the ED sent in by her  for suicidal ideations with a plan to overdose on between 2-90 melatonin tablets upset about mom not helping her financially with her housing situation. Upon evaluation today patient is minimizing the circumstance of hospitalization. She states that she is going to go live with her mother on discharge temporarily until she can find good housing. She states she has to be out of her current apartment by April 30. She indicates that she has stress as to she is not sure where she is going to live and that she is having financial issues with paying for the housing. She continues to report that she has autism and that is why she reacts the way that she reacts. On patient's last admission she was recommended to take mood stabilizers however patient indicated to nursing staff that she took a blood pressure pill at night nursing staff called the pharmacy who changes medication tonight patient then had a syncopal episode overnight and patient then refused to take any medications believing that it was the psych medications rather than understanding it was a blood pressure pill she took at night. Patient has very poor insight and judgment she is focused on discharge she has no insight or judgment regarding why she is in the hospital about her impulsivity. She believes that her problems are stemming from being taken off of her Prozac which she was taking 3 times a day upon last admission.   Patient's

## 2023-04-11 NOTE — ED NOTES
THE PT WAS ACCEPTED TO [address] [place] [address]. DISPOSITION CALLED TO BASHIR IN ADMITTING.      Janene Ramirez, [facility]  04/10/23 4069

## 2023-04-11 NOTE — GROUP NOTE
Group Therapy Note    Date: 4/11/2023    Group Start Time: 1010  Group End Time: 1483  Group Topic: Psychoeducation    [facility] 7[facility] ACUTE BH 1    Tivis Rash, CTRS                                                                      Type of Group: Psychoeducation    Wellness Binder Information  Module Name:  benjamin id of stressors and positive coping for stress     Patient's Goal:  Patient will be able to id daily/life events one is experiencing and positive ways to manage. Notes:  Patient pleasant and willing to share appropriately. Accepting of handout. Status After Intervention:  Improved    Participation Level:  Active Listener and Interactive    Participation Quality: Appropriate, Attentive, and Sharing      Speech:  normal      Thought Process/Content: Logical      Affective Functioning: Congruent      Mood: euthymic      Level of consciousness:  Alert, Oriented x4, and Attentive      Response to Learning: Able to verbalize/acknowledge new learning, Able to retain information, and Progressing to goal      Endings: None Reported    Modes of Intervention: Education, Support, Socialization, Exploration, and Problem-solving      Discipline Responsible: Psychoeducational Specialist      Signature:  Manoj Taylor

## 2023-04-11 NOTE — BH NOTE
[facility]  Initial Interdisciplinary Treatment Plan NOTE    Review Date & Time:  4-11-23   1000 am  Patient was not in treatment team    Admission Type:   Admission Type: Involuntary    Reason for admission:  Reason for Admission: Stated to [facility]  that she was thinking about overdosing on her Prozac. Pt states she was frustrated, upset, and stressed due to moving and Mom stating she was not going to help her financially as she had agreed to prior. Pt denies intention on doing it      Estimated Length of Stay Update:   3-5 days  Estimated Discharge Date Update:  3-5 days    EDUCATION:   Learner Progress Toward Treatment Goals: Reviewed results and recommendations of this team    Method: Small group    Outcome: Verbalized understanding    PLAN/TREATMENT RECOMMENDATIONS UPDATE: Begin medication regimen and assess pt responses. GOALS UPDATE:   Time frame for Short-Term Goals:  Daily reassessment.      Luisito Mcfarlane RN

## 2023-04-12 PROCEDURE — 94640 AIRWAY INHALATION TREATMENT: CPT

## 2023-04-12 PROCEDURE — 6370000000 HC RX 637 (ALT 250 FOR IP): Performed by: PSYCHIATRY & NEUROLOGY

## 2023-04-12 PROCEDURE — 6370000000 HC RX 637 (ALT 250 FOR IP): Performed by: NURSE PRACTITIONER

## 2023-04-12 PROCEDURE — 1240000000 HC EMOTIONAL WELLNESS R&B

## 2023-04-12 RX ADMIN — LEVOTHYROXINE SODIUM 100 MCG: 0.1 TABLET ORAL at 06:21

## 2023-04-12 RX ADMIN — ACETAMINOPHEN 650 MG: 325 TABLET ORAL at 12:58

## 2023-04-12 RX ADMIN — CLARITHROMYCIN 250 MG: 250 TABLET ORAL at 08:44

## 2023-04-12 RX ADMIN — BUDESONIDE 500 MCG: 0.5 SUSPENSION RESPIRATORY (INHALATION) at 20:45

## 2023-04-12 RX ADMIN — LISINOPRIL 10 MG: 20 TABLET ORAL at 08:44

## 2023-04-12 RX ADMIN — OLANZAPINE 5 MG: 5 TABLET, FILM COATED ORAL at 21:59

## 2023-04-12 RX ADMIN — ACETAMINOPHEN 650 MG: 325 TABLET ORAL at 01:34

## 2023-04-12 RX ADMIN — MELATONIN 3 MG ORAL TABLET 3 MG: 3 TABLET ORAL at 21:59

## 2023-04-12 RX ADMIN — NORETHINDRONE ACETATE AND ETHINYL ESTRADIOL 1 TABLET: 1; .02 TABLET ORAL at 08:44

## 2023-04-12 RX ADMIN — CETIRIZINE HYDROCHLORIDE 10 MG: 10 TABLET, FILM COATED ORAL at 08:44

## 2023-04-12 RX ADMIN — MONTELUKAST 10 MG: 10 TABLET, FILM COATED ORAL at 21:59

## 2023-04-12 RX ADMIN — FLUTICASONE PROPIONATE 1 SPRAY: 50 SPRAY, METERED NASAL at 08:44

## 2023-04-12 RX ADMIN — ARFORMOTEROL TARTRATE 15 MCG: 15 SOLUTION RESPIRATORY (INHALATION) at 20:45

## 2023-04-12 ASSESSMENT — PAIN - FUNCTIONAL ASSESSMENT: PAIN_FUNCTIONAL_ASSESSMENT: ACTIVITIES ARE NOT PREVENTED

## 2023-04-12 ASSESSMENT — PAIN SCALES - GENERAL
PAINLEVEL_OUTOF10: 4
PAINLEVEL_OUTOF10: 7

## 2023-04-12 ASSESSMENT — PAIN DESCRIPTION - ORIENTATION: ORIENTATION: RIGHT;POSTERIOR

## 2023-04-12 ASSESSMENT — PAIN DESCRIPTION - DESCRIPTORS
DESCRIPTORS: ACHING;GNAWING
DESCRIPTORS: ACHING;SQUEEZING;DISCOMFORT

## 2023-04-12 ASSESSMENT — PAIN DESCRIPTION - LOCATION
LOCATION: HEAD
LOCATION: HEAD

## 2023-04-12 NOTE — PLAN OF CARE
Pt out in the common area coloring and watching TV. Pt denies SI, HI and AVH. Pt took aerosol treatments, tolerated well. Pt wanted to have a print out of her medications and also wanted to know what medication she was taken off and then put on last time she was here that made her faint. Problem: Anxiety  Goal: Will report anxiety at manageable levels  Description: INTERVENTIONS:  1. Administer medication as ordered  2. Teach and rehearse alternative coping skills  3. Provide emotional support with 1:1 interaction with staff  4/11/2023 2120 by Cordell Boas, RN  Outcome: Progressing     Problem: Depression/Self Harm  Goal: Effect of psychiatric condition will be minimized and patient will be protected from self harm  Description: INTERVENTIONS:  1. Assess impact of patient's symptoms on level of functioning, self care needs and offer support as indicated  2. Assess patient/family knowledge of depression, impact on illness and need for teaching  3. Provide emotional support, presence and reassurance  4. Assess for possible suicidal thoughts or ideation. If patient expresses suicidal thoughts or statements do not leave alone, initiate Suicide Precautions, move to a room close to the nursing station and obtain sitter  5. Initiate consults as appropriate with Mental Health Professional, Spiritual Care, Psychosocial CNS, and consider a recommendation to the LIP for a Psychiatric Consultation  4/11/2023 2120 by Cordell Boas, RN  Outcome: Progressing  Flowsheets (Taken 4/11/2023 1650 by Melissa Nyhan, RN)  Effect of psychiatric condition will be minimized and patient will be protected from self harm: Provide emotional support, presence and reassurance     Problem: Involuntary Admit  Goal: Will cooperate with staff recommendations and doctor's orders and will demonstrate appropriate behavior  Description: INTERVENTIONS:  1. Treat underlying conditions and offer medication as ordered  2.  Educate regarding

## 2023-04-12 NOTE — BH NOTE
[facility]  Day 3 Interdisciplinary Treatment Plan NOTE    Review Date & Time: 0900 4/12    Patient was in treatment team    Estimated Length of Stay Update:  5-7  Estimated Discharge Date Update: 4/15    EDUCATION:   Learner Progress Toward Treatment Goals: Reviewed results and recommendations of this team, Reviewed group plan and strategies, Reviewed signs, symptoms and risk of self harm and violent behavior, and Reviewed goals and plan of care    Method: Small group    Outcome: Verbalized understanding and Demonstrated Understanding    PATIENT GOALS: be happy    PLAN/TREATMENT RECOMMENDATIONS UPDATE:encourage meds and groups    GOALS UPDATE:   Time frame for Short-Term Goals: 1-3      José Miguel Chavarria RN

## 2023-04-12 NOTE — BH NOTE
Patient is out in day area throughout majority of the day. Patient at nurses station to ask questions about medications. Patient pleasant and cooperative. Sociable with peers. Attends occasional group.

## 2023-04-13 VITALS
HEART RATE: 100 BPM | TEMPERATURE: 96.8 F | BODY MASS INDEX: 25.81 KG/M2 | WEIGHT: 148 LBS | RESPIRATION RATE: 14 BRPM | OXYGEN SATURATION: 96 % | SYSTOLIC BLOOD PRESSURE: 132 MMHG | DIASTOLIC BLOOD PRESSURE: 84 MMHG

## 2023-04-13 PROCEDURE — 6370000000 HC RX 637 (ALT 250 FOR IP): Performed by: NURSE PRACTITIONER

## 2023-04-13 PROCEDURE — 94640 AIRWAY INHALATION TREATMENT: CPT

## 2023-04-13 RX ORDER — LANOLIN ALCOHOL/MO/W.PET/CERES
3 CREAM (GRAM) TOPICAL NIGHTLY
Qty: 30 TABLET | Refills: 0 | Status: SHIPPED | OUTPATIENT
Start: 2023-04-13 | End: 2023-05-13

## 2023-04-13 RX ORDER — OLANZAPINE 5 MG/1
5 TABLET ORAL NIGHTLY
Qty: 30 TABLET | Refills: 0 | Status: SHIPPED | OUTPATIENT
Start: 2023-04-13 | End: 2023-05-13

## 2023-04-13 RX ADMIN — CLARITHROMYCIN 250 MG: 250 TABLET ORAL at 09:06

## 2023-04-13 RX ADMIN — LEVOTHYROXINE SODIUM 100 MCG: 0.1 TABLET ORAL at 06:22

## 2023-04-13 RX ADMIN — LISINOPRIL 10 MG: 20 TABLET ORAL at 09:06

## 2023-04-13 RX ADMIN — FLUTICASONE PROPIONATE 1 SPRAY: 50 SPRAY, METERED NASAL at 09:07

## 2023-04-13 RX ADMIN — BUDESONIDE 500 MCG: 0.5 SUSPENSION RESPIRATORY (INHALATION) at 09:42

## 2023-04-13 RX ADMIN — CETIRIZINE HYDROCHLORIDE 10 MG: 10 TABLET, FILM COATED ORAL at 09:07

## 2023-04-13 RX ADMIN — ARFORMOTEROL TARTRATE 15 MCG: 15 SOLUTION RESPIRATORY (INHALATION) at 09:41

## 2023-04-13 RX ADMIN — NORETHINDRONE ACETATE AND ETHINYL ESTRADIOL 1 TABLET: 1; .02 TABLET ORAL at 09:07

## 2023-04-13 ASSESSMENT — PAIN SCALES - GENERAL: PAINLEVEL_OUTOF10: 0

## 2023-04-13 NOTE — CARE COORDINATION
In order to ensure appropriate transition and discharge planning is in place, the following documents have been transmitted to [facility], as the new outpatient provider:    The d/c diagnosis was transmitted to the next care provider  The reason for hospitalization was transmitted to the next care provider  The d/c medications (dosage and indication) were transmitted to the next care provider   The continuing care plan was transmitted to the next care provider
SW encouraged pt to attend psychotherapy group today. Pt declined to attend group.
Denies     After consideration of C-SSRS screening results, C-SSRS assessments, and this professional's assessment the patient's overall suicide risk assessed to be:  [] None   [x] Low   [] Moderate   [] High     [x] Discussed current suicide risk, protective and risk factors with RN and NP/Psychiatrist.    Discharge Plan:  [x] Home: where she lives alone, mother or brother to transport. [] Shelter:  [] Crisis Unit:  [] Substance Abuse Rehab:  [] Nursing Facility:  [] Other (Specify): Follow up Provider: Pt is active with [facility]. [facility] called  to obtain appointment information. Pt has appointment 4/25 at 10:45am for medication management. Pt has counseling appointment 4/18 at 11am via telehealth.

## 2023-04-13 NOTE — DISCHARGE SUMMARY
This patient no longer meets criteria for inpatient hospitalization. No AVH or paranoid thoughts  No Hopeless or worthless feeling  No active SI/HI  Appetite:  [x] Normal  [] Increased  [] Decreased    Sleep:       [x] Normal  [] Fair       [] Poor            Energy:    [x] Normal  [] Increased  [] Decreased     SI [] Present  [x] Absent  HI  []Present  [x] Absent   Aggression:  [] yes  [x] no  Patient is [x] able  [] unable to CONTRACT FOR SAFETY   Medication side effects(SE):  [x] None(Psych. Meds.) [] Other      Mental Status Examination on discharge:    Level of consciousness:  within normal limits   Appearance:  well-appearing  Behavior/Motor:  no abnormalities noted  Attitude toward examiner:  attentive and good eye contact  Speech:  spontaneous, normal rate and normal volume   Mood: \"My mood is good. \"  Affect: Appropriate and pleasant  Thought processes: Appropriate and pleasant  Thought content: Devoid of auditory visualizations delusions or perceptual problems.   Denies SI/HI intent or plan  Cognition:  oriented to person, place, and time   Concentration intact  Memory intact  Insight good   Judgement fair   Fund of Knowledge adequate      ASSESSMENT:  Patient symptoms are:  [x] Well controlled  [x] Improving  [] Worsening  [] No change    Reason for more than one antipsychotic:  [x] N/A  [] 3 Failed Monotherapy attempts (Drugs tried:)  [] Crossover to a new antipsychotic  [] Taper to Monotherapy from Polypharmacy  [] Augmentation of clozapine therapy due to treatment resistance to single therapy    Diagnosis:  Principal Problem:    Autistic disorder  Active Problems:    Personality disorder ([facility] [address]  Resolved Problems:    Depression with suicidal ideation      LABS:    Recent Labs     04/10/23  1509   WBC 8.7   HGB 15.2        Recent Labs     04/10/23  1509      K 4.0      CO2 24   BUN 11   CREATININE 0.7   GLUCOSE 95     Recent Labs     04/10/23  1509   BILITOT 0.3   ALKPHOS 46

## 2023-04-13 NOTE — PLAN OF CARE
Problem: Involuntary Admit  Goal: Will cooperate with staff recommendations and doctor's orders and will demonstrate appropriate behavior  Description: INTERVENTIONS:  1. Treat underlying conditions and offer medication as ordered  2. Educate regarding involuntary admission procedures and rules  3. Contain excessive/inappropriate behavior per unit and hospital policies  Outcome: Progressing     Problem: Anxiety  Goal: Will report anxiety at manageable levels  Description: INTERVENTIONS:  1. Administer medication as ordered  2. Teach and rehearse alternative coping skills  3. Provide emotional support with 1:1 interaction with staff  Outcome: Progressing      Pt denies SI, HI and AVH. Pt out on the unit. Pt is talking the unit with peers. Brightened. Worried about finding a place that has in unit laundry and a place where people don't smoke. Medication compliant. Encouraged groups. Will continue to monitor.

## 2023-04-13 NOTE — PROGRESS NOTES
[facility]  Admission Note   Pt brought from ER in a w/c. Pt to ER due to making a  statement that she was going to OD on her Prozac. Pt stated this to her [facility] . Per pt, she is upset, anxious, and stressed over her current moving situation. Mom also has changed her mind on helping the pt financially. Pt states she had no intent on acting on it. Pt was pink slipped in ER due to this. Pt denies HI and AVH as well. Pt anxious. Recently here in March. Pt has a tele appt with Pamela Garcia, therapist from [facility], at 11am today. Pt settled and oriented to room. Admission Type:   Admission Type: Involuntary    Reason for admission:  Reason for Admission: Stated to [facility]  that she was thinking about overdosing on her Prozac. Pt states she was frustrated, upset, and stressed due to moving and Mom stating she was not going to help her financially as she had agreed to prior.  Pt denies intention on doing it      Addictive Behavior:   Addictive Behavior  In the Past 3 Months, Have You Felt or Has Someone Told You That You Have a Problem With  : None    Medical Problems:   Past Medical History:   Diagnosis Date    Anxiety     Common variable immunodeficiency ([facility] [address]     FU with Dr. Aubrie Christensen, no recent issues, stable     Dysmenorrhea     Environmental allergies     Hypertension     Kidney stone     Thyroid disease        Status EXAM:  Mental Status and Behavioral Exam  Normal: No  Level of Assistance: Independent/Self  Facial Expression: Flat  Affect: Appropriate  Level of Consciousness: Alert  Frequency of Checks: 4 times per hour, close  Mood:Normal: No  Mood: Anxious  Motor Activity:Normal: Yes  Eye Contact: Good  Observed Behavior: Friendly, Cooperative  Sexual Misconduct History: Past - no  Preception: Roy to person, Roy to time, Roy to place, Roy to situation  Attention:Normal: Yes  Thought Processes: Circumstantial  Thought Content:Normal: Yes  Depression
BEHAVIORAL HEALTH FOLLOW-UP NOTE     4/12/2023     Patient was seen and examined in person, Chart reviewed   Patient's case discussed with staff/team    Chief Complaint: \"I am doing good. \"    Interim History: I saw patient this morning in her room. She gives me a paper that she wants me to discuss with . It has written on it about her counseling that she has and also a  from care source who have been very helpful to her. She also states that she is planning on moving in with her mother and that her mother has been very cooperative. She states that she has a much more definitive and structured plan than her last discharge. She also has an appointment with a nurse practitioner she states that she has been going to the groups that they have helped her and that she has learned some coping allergies here. She denies suicidal homicidal ideations intent or plan she denies any auditory or visual hallucination she is eating well sleeping well there are no neurovegetative signs of depression no overt or covert signs of psychosis      Appetite: [x] Normal/Unchanged  [] Increased  [] Decreased      Sleep:       [x] Normal/Unchanged  [] Fair       [] Poor              Energy:    [x] Normal/Unchanged  [] Increased  [] Decreased        SI [] Present  [x] Absent    HI  []Present  [x] Absent     Aggression:  [] yes  [x] no    Patient is [x] able  [] unable to CONTRACT FOR SAFETY     PAST MEDICAL/PSYCHIATRIC HISTORY:   Past Medical History:   Diagnosis Date    Anxiety     [facility]     FU with Dr. Aubrie Christensen, no recent issues, stable     Dysmenorrhea     Environmental allergies     Hypertension     Kidney stone     Thyroid disease        FAMILY/SOCIAL HISTORY:  No family history on file.   Social History     Socioeconomic History    Marital status: Single     Spouse name: Not on file    Number of children: Not on file    Years of education: Not on file    Highest education level: Not on
CLINICAL PHARMACY NOTE: MEDS TO BEDS    Total # of Prescriptions Filled: 2   The following medications were delivered to the patient:  Olanzapine 5mg  Melatonin 3mg    Additional Documentation:  Delivered to [facility] ROBERT AUBREY 4-13-23 @10:40am
McDonough De was ordered norethrindrone/ ethinyl estradiol which is a nonformulary medication. This medication will need to be supplied by the patient as the pharmacy does not carry this non-formulary medication. If the medication has not been administered by 1400 on the following day from the time the order was placed, a pharmacist will follow-up with the nurse of the patient to assess the capability of the patient to bring in the medication. If it is determined that the patient cannot supply the medication and it is not available to be dispensed from the pharmacy, the provider will be notified.
Patient attended afternoon peer recovery group.   Patient appeared to be an active listener to Zayra (peer recovery counselor) share her story and discuss how she stays sober.   Patient was 1 of 9 in attendance.   
Patient attended community meeting   Was updated on expectations of the unit, staffing, and programming  Patient shared goal for today as just to stay positive and prepare myself for discharge.
Patient attended community meeting   Was updated on expectations of the unit, staffing, and programming  Patient shared goal for today as work on being positive.
Patient attended morning community meeting. Updated on staffing assignments and daily expectations. Shared goal for the day as to stay positive.
Patient declined invitation to the following groups    Education    Patient will continue to be provided with opportunities to enhance leisure skills/interests and/or coping mechanisms.
Pt denies suicidal / homicidal ideations. Pt denies hallucinations. Pt is cooperative. Negative immediate behavioral concerns. Will follow and monitor.
Recreation assessment completed.
( ) Basic information about quitting (benefits of quitting, techniques in how to quit, available resources  ( ) Referral for counseling faxed to [facility]                                                                                                                   ( ) Patient refused counseling  ( ) Patient refused referral  ( ) Patient refused prescription upon discharge  ( x) Patient has not smoked in the last 30 days    Metabolic Screening:    Lab Results   Component Value Date    LABA1C 5.3 11/15/2022       Lab Results   Component Value Date    CHOL 185 11/15/2022    CHOL 155 04/19/2022    CHOL 174 10/12/2021    CHOL 176 04/09/2021     Lab Results   Component Value Date    TRIG 85 11/15/2022    TRIG 94 04/19/2022    TRIG 71 10/12/2021    TRIG 46 04/09/2021     Lab Results   Component Value Date    HDL 93 11/15/2022    HDL 78 04/19/2022    HDL 84 10/12/2021     04/09/2021     No components found for: LDLCAL  Lab Results   Component Value Date    LABVLDL 17 11/15/2022    LABVLDL 19 04/19/2022    LABVLDL 14 10/12/2021    LABVLDL 9 04/09/2021       Bibi Donnelly RN

## 2023-04-13 NOTE — GROUP NOTE
Group Therapy Note    Date: 4/13/2023    Group Start Time: 1000  Group End Time: 1100  Group Topic: Guest Group    [facility] 7[facility] ACUTE BH 2    Erma Modi [place]                                                                        Group Therapy Note    Date: 4/13/2023  Start Time: 1000  End Time:  1100  Number of Participants: 12    Type of Group: Healthy Living/Wellness    Wellness Binder Information  Module Name:  Musical stylings and Peer support with Nnamdi Louis    Patient's Goal: To encourage positive well being. To socialize with peers and ID one step to improving well being. Notes:   Patients were invited to engage in listening to musician Nnamdi Louis tell his story of recovery through song and discussion. Patient exhibited a calm and cooperative behavior. Status After Intervention:  Improved    Participation Level:  Active Listener and interactive    Participation Quality: Appropriate and Attentive      Speech:  normal      Thought Process/Content: Logical      Affective Functioning: Congruent      Mood:  calm and content      Level of consciousness:  Alert and Attentive      Response to Learning: Able to verbalize current knowledge/experience and Able to verbalize/acknowledge new learning      Endings: None Reported    Modes of Intervention: Education, Socialization, and Activity      Discipline Responsible: Psychoeducational Specialist      Signature:  Joshua Galindo

## 2023-04-18 RX ORDER — FLUTICASONE PROPIONATE 50 MCG
SPRAY, SUSPENSION (ML) NASAL
Qty: 16 G | OUTPATIENT
Start: 2023-04-18

## 2023-05-16 RX ORDER — MONTELUKAST SODIUM 10 MG/1
TABLET ORAL
Qty: 90 TABLET | OUTPATIENT
Start: 2023-05-16

## 2023-05-23 RX ORDER — MONTELUKAST SODIUM 10 MG/1
TABLET ORAL
Qty: 90 TABLET | OUTPATIENT
Start: 2023-05-23

## 2023-05-23 RX ORDER — PSEUDOEPHEDRINE HCL 30 MG/1
TABLET, FILM COATED ORAL
Qty: 100 TABLET | OUTPATIENT
Start: 2023-05-23

## 2023-05-27 RX ORDER — AZELASTINE HYDROCHLORIDE 137 UG/1
SPRAY, METERED NASAL
Qty: 30 ML | OUTPATIENT
Start: 2023-05-27

## 2023-06-22 ENCOUNTER — OFFICE VISIT (OUTPATIENT)
Dept: FAMILY MEDICINE CLINIC | Age: 53
End: 2023-06-22
Payer: COMMERCIAL

## 2023-06-22 ENCOUNTER — OFFICE VISIT (OUTPATIENT)
Dept: ORTHOPEDIC SURGERY | Age: 53
End: 2023-06-22

## 2023-06-22 VITALS
SYSTOLIC BLOOD PRESSURE: 132 MMHG | HEART RATE: 96 BPM | WEIGHT: 147 LBS | TEMPERATURE: 97.8 F | BODY MASS INDEX: 26.05 KG/M2 | DIASTOLIC BLOOD PRESSURE: 88 MMHG | OXYGEN SATURATION: 98 % | HEIGHT: 63 IN | RESPIRATION RATE: 18 BRPM

## 2023-06-22 VITALS — BODY MASS INDEX: 25.27 KG/M2 | HEIGHT: 64 IN | WEIGHT: 148 LBS

## 2023-06-22 DIAGNOSIS — R21 RASH AND NONSPECIFIC SKIN ERUPTION: Primary | ICD-10-CM

## 2023-06-22 DIAGNOSIS — M25.552 PAIN OF LEFT HIP: Primary | ICD-10-CM

## 2023-06-22 PROCEDURE — 3079F DIAST BP 80-89 MM HG: CPT | Performed by: PHYSICIAN ASSISTANT

## 2023-06-22 PROCEDURE — G8427 DOCREV CUR MEDS BY ELIG CLIN: HCPCS | Performed by: PHYSICIAN ASSISTANT

## 2023-06-22 PROCEDURE — G8419 CALC BMI OUT NRM PARAM NOF/U: HCPCS | Performed by: PHYSICIAN ASSISTANT

## 2023-06-22 PROCEDURE — 3017F COLORECTAL CA SCREEN DOC REV: CPT | Performed by: PHYSICIAN ASSISTANT

## 2023-06-22 PROCEDURE — 3075F SYST BP GE 130 - 139MM HG: CPT | Performed by: PHYSICIAN ASSISTANT

## 2023-06-22 PROCEDURE — 99213 OFFICE O/P EST LOW 20 MIN: CPT | Performed by: PHYSICIAN ASSISTANT

## 2023-06-22 PROCEDURE — 1036F TOBACCO NON-USER: CPT | Performed by: PHYSICIAN ASSISTANT

## 2023-06-22 RX ORDER — BREXPIPRAZOLE 0.5 MG/1
0.5 TABLET ORAL DAILY
COMMUNITY
Start: 2023-06-20

## 2023-06-22 RX ORDER — TRIAMCINOLONE ACETONIDE 5 MG/G
OINTMENT TOPICAL
Qty: 15 G | Refills: 2 | Status: SHIPPED | OUTPATIENT
Start: 2023-06-22

## 2023-06-22 RX ORDER — LIDOCAINE HYDROCHLORIDE 10 MG/ML
4 INJECTION, SOLUTION INFILTRATION; PERINEURAL ONCE
Status: COMPLETED | OUTPATIENT
Start: 2023-06-22 | End: 2023-06-22

## 2023-06-22 RX ORDER — TRIAMCINOLONE ACETONIDE 40 MG/ML
40 INJECTION, SUSPENSION INTRA-ARTICULAR; INTRAMUSCULAR ONCE
Status: COMPLETED | OUTPATIENT
Start: 2023-06-22 | End: 2023-06-22

## 2023-06-22 RX ADMIN — TRIAMCINOLONE ACETONIDE 40 MG: 40 INJECTION, SUSPENSION INTRA-ARTICULAR; INTRAMUSCULAR at 14:10

## 2023-06-22 RX ADMIN — LIDOCAINE HYDROCHLORIDE 4 ML: 10 INJECTION, SOLUTION INFILTRATION; PERINEURAL at 14:09

## 2023-06-22 NOTE — PROGRESS NOTES
local tissue breakdown, systemic effects of corticosteroids, elevation of blood glucose, injury to soft tissue and/or nerves, and seizure, the patient indicated their understanding and agreed to proceed. ? Discussed the risks, benefits, alternatives, and the necessity of other members of the healthcare team participating in the procedure. ?All questions answered and consent given. All questions and concerns were addressed and consent was verbalized by the patient. FOLLOW UP    Follow up in 6-8 weeks.     Electronically signed by Svetlana Cowan MD on 6/22/2023 at 2:04 PM

## 2023-06-22 NOTE — PROGRESS NOTES
23  Duane Gonzalez : 1970 Sex: female  Age 50 y.o. Subjective:  Chief Complaint   Patient presents with    Rash     legs         HPI:   Duane Gonzalez , 50 y.o. female presents to express care for evaluation of rash on legs    HPI  53-year-old female presents to express care for evaluation of rash on her legs. The patient has had the symptoms ongoing for quite a while. This is not a new finding it just tends to flare at certain times. 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 The patient has tolerated triamcinolone to the areas in the past and seems to work very well for her. The patient is not having any fever, chills. The rash only seems localized to the legs. ROS:   Unless otherwise stated in this report the patient's positive and negative responses for review of systems for constitutional, eyes, ENT, cardiovascular, respiratory, gastrointestinal, neurological, , musculoskeletal, and integument systems and related systems to the presenting problem are either stated in the history of present illness or were not pertinent or were negative for the symptoms and/or complaints related to the presenting medical problem. Positives and pertinent negatives as per HPI. All others reviewed and are negative. PMH:     Past Medical History:   Diagnosis Date    Anxiety     Common variable immunodeficiency ([facility] [address]     FU with Dr. Florin Ventura, no recent issues, stable     Dysmenorrhea     Environmental allergies     Hypertension     Kidney stone     Thyroid disease        Past Surgical History:   Procedure Laterality Date    COLONOSCOPY  2016    LITHOTRIPSY  2019    UPPER GASTROINTESTINAL ENDOSCOPY  2016    WISDOM TOOTH EXTRACTION         History reviewed. No pertinent family history.     Medications:     Current Outpatient Medications:     triamcinolone (ARISTOCORT) 0.5 % ointment, Apply

## 2023-07-03 DIAGNOSIS — Z30.41 ENCOUNTER FOR SURVEILLANCE OF CONTRACEPTIVE PILLS: ICD-10-CM

## 2023-07-03 RX ORDER — NORETHINDRONE ACETATE AND ETHINYL ESTRADIOL 1; .02 MG/1; MG/1
TABLET ORAL
Qty: 3 PACKET | Refills: 3 | Status: SHIPPED | OUTPATIENT
Start: 2023-07-03

## 2023-07-10 RX ORDER — BUDESONIDE AND FORMOTEROL FUMARATE DIHYDRATE 160; 4.5 UG/1; UG/1
AEROSOL RESPIRATORY (INHALATION)
Qty: 30.6 G | Refills: 3 | Status: SHIPPED | OUTPATIENT
Start: 2023-07-10

## 2023-07-24 RX ORDER — FLUTICASONE PROPIONATE 50 MCG
SPRAY, SUSPENSION (ML) NASAL
Qty: 48 G | Refills: 3 | Status: SHIPPED | OUTPATIENT
Start: 2023-07-24

## 2023-09-07 DIAGNOSIS — I10 PRIMARY HYPERTENSION: ICD-10-CM

## 2023-09-07 DIAGNOSIS — Z30.41 ENCOUNTER FOR SURVEILLANCE OF CONTRACEPTIVE PILLS: ICD-10-CM

## 2023-09-07 RX ORDER — NORETHINDRONE ACETATE AND ETHINYL ESTRADIOL 1; .02 MG/1; MG/1
TABLET ORAL
Qty: 3 PACKET | Refills: 3 | Status: SHIPPED | OUTPATIENT
Start: 2023-09-07

## 2023-09-07 RX ORDER — LISINOPRIL 10 MG/1
10 TABLET ORAL DAILY
Qty: 90 TABLET | Refills: 1 | Status: SHIPPED | OUTPATIENT
Start: 2023-09-07

## 2023-09-07 NOTE — TELEPHONE ENCOUNTER
----- Message from Gardenia Kawasaki sent at 9/7/2023 10:19 AM EDT -----  Subject: Refill Request    QUESTIONS  Name of Medication? norethindrone-ethinyl estradiol (RAMILA 1/20) 1-20   MG-MCG per tablet  Patient-reported dosage and instructions? once a day   How many days do you have left? 4  Preferred Pharmacy? [facility] phone number (if available)? [phone]  ---------------------------------------------------------------------------  --------------,  Name of Medication? lisinopril (PRINIVIL;ZESTRIL) 10 MG tablet  Patient-reported dosage and instructions? once a day  How many days do you have left? 4  Preferred Pharmacy? [facility] phone number (if available)? [phone]  Additional Information for Provider? Has an appt in Oct   ---------------------------------------------------------------------------  --------------  CALL BACK INFO  What is the best way for the office to contact you? OK to leave message on   voicemail  Preferred Call Back Phone Number? [phone]  ---------------------------------------------------------------------------  --------------  SCRIPT ANSWERS  Relationship to Patient?  Self

## 2023-09-12 ENCOUNTER — TELEPHONE (OUTPATIENT)
Dept: PRIMARY CARE CLINIC | Age: 53
End: 2023-09-12

## 2023-09-12 NOTE — TELEPHONE ENCOUNTER
Pt states she was seen in urgent care(non [facility]) for fatigue and sinus congestion. She states that she was dx with virus and told to take OTC meds. She has had sxs x 2days.   She states she is worried this virus will turn into a sinus infection because she is stressed and has compromised immune system and asking for meds

## 2023-09-19 ENCOUNTER — OFFICE VISIT (OUTPATIENT)
Dept: PRIMARY CARE CLINIC | Age: 53
End: 2023-09-19
Payer: COMMERCIAL

## 2023-09-19 VITALS
BODY MASS INDEX: 26.22 KG/M2 | HEIGHT: 63 IN | HEART RATE: 114 BPM | DIASTOLIC BLOOD PRESSURE: 70 MMHG | OXYGEN SATURATION: 98 % | SYSTOLIC BLOOD PRESSURE: 112 MMHG | WEIGHT: 148 LBS | TEMPERATURE: 97.4 F

## 2023-09-19 DIAGNOSIS — F60.9 PERSONALITY DISORDER (HCC): ICD-10-CM

## 2023-09-19 DIAGNOSIS — R73.01 IMPAIRED FASTING GLUCOSE: ICD-10-CM

## 2023-09-19 DIAGNOSIS — I10 PRIMARY HYPERTENSION: Primary | ICD-10-CM

## 2023-09-19 DIAGNOSIS — E55.9 VITAMIN D INSUFFICIENCY: ICD-10-CM

## 2023-09-19 DIAGNOSIS — F41.1 GAD (GENERALIZED ANXIETY DISORDER): ICD-10-CM

## 2023-09-19 DIAGNOSIS — E03.9 ACQUIRED HYPOTHYROIDISM: ICD-10-CM

## 2023-09-19 DIAGNOSIS — J01.00 ACUTE NON-RECURRENT MAXILLARY SINUSITIS: ICD-10-CM

## 2023-09-19 DIAGNOSIS — R09.81 NASAL CONGESTION: ICD-10-CM

## 2023-09-19 LAB
INFLUENZA A ANTIBODY: NORMAL
INFLUENZA B ANTIBODY: NORMAL
Lab: NORMAL
PERFORMING INSTRUMENT: NORMAL
QC PASS/FAIL: NORMAL
SARS-COV-2, POC: NORMAL

## 2023-09-19 PROCEDURE — 99214 OFFICE O/P EST MOD 30 MIN: CPT | Performed by: FAMILY MEDICINE

## 2023-09-19 PROCEDURE — 1036F TOBACCO NON-USER: CPT | Performed by: FAMILY MEDICINE

## 2023-09-19 PROCEDURE — 87426 SARSCOV CORONAVIRUS AG IA: CPT | Performed by: FAMILY MEDICINE

## 2023-09-19 PROCEDURE — 3078F DIAST BP <80 MM HG: CPT | Performed by: FAMILY MEDICINE

## 2023-09-19 PROCEDURE — G8419 CALC BMI OUT NRM PARAM NOF/U: HCPCS | Performed by: FAMILY MEDICINE

## 2023-09-19 PROCEDURE — G8427 DOCREV CUR MEDS BY ELIG CLIN: HCPCS | Performed by: FAMILY MEDICINE

## 2023-09-19 PROCEDURE — 3074F SYST BP LT 130 MM HG: CPT | Performed by: FAMILY MEDICINE

## 2023-09-19 PROCEDURE — 3017F COLORECTAL CA SCREEN DOC REV: CPT | Performed by: FAMILY MEDICINE

## 2023-09-19 PROCEDURE — 87804 INFLUENZA ASSAY W/OPTIC: CPT | Performed by: FAMILY MEDICINE

## 2023-09-19 RX ORDER — CEFDINIR 300 MG/1
300 CAPSULE ORAL 2 TIMES DAILY
Qty: 14 CAPSULE | Refills: 0 | Status: SHIPPED | OUTPATIENT
Start: 2023-09-19 | End: 2023-09-26

## 2023-09-19 RX ORDER — LORAZEPAM 0.5 MG/1
0.5 TABLET ORAL DAILY PRN
COMMUNITY

## 2023-09-19 RX ORDER — LEVOTHYROXINE SODIUM 0.1 MG/1
TABLET ORAL
Qty: 90 TABLET | Refills: 3 | Status: SHIPPED | OUTPATIENT
Start: 2023-09-19

## 2023-09-19 RX ORDER — FLUOXETINE HYDROCHLORIDE 20 MG/1
20 CAPSULE ORAL 3 TIMES DAILY
COMMUNITY
Start: 2023-08-24

## 2023-09-19 ASSESSMENT — ENCOUNTER SYMPTOMS
DIARRHEA: 0
VOMITING: 0
BACK PAIN: 0
RHINORRHEA: 0
EYE DISCHARGE: 0
COUGH: 0
SINUS PRESSURE: 1
WHEEZING: 0
CONSTIPATION: 0
ABDOMINAL PAIN: 0
SHORTNESS OF BREATH: 0
SORE THROAT: 0
NAUSEA: 0
SINUS PAIN: 0

## 2023-09-19 NOTE — PROGRESS NOTES
chills. Patient denies anorexia, chest pain, dizziness, fevers, myalgias, nausea, and shortness of breath. She has had symptoms for a few weeks. Symptoms have unchanged since that time. She has tried Saline, Nasonex, Astelin, Zyrtec at home without improvement in symptoms. ROS:  Review of Systems   Constitutional:  Negative for appetite change, chills, fatigue and fever. HENT:  Positive for congestion, postnasal drip and sinus pressure. Negative for ear pain, rhinorrhea, sinus pain and sore throat. Eyes:  Negative for discharge. Respiratory:  Negative for cough, shortness of breath and wheezing. Cardiovascular:  Negative for chest pain and palpitations. Gastrointestinal:  Negative for abdominal pain, constipation, diarrhea, nausea and vomiting. Musculoskeletal:  Negative for arthralgias and back pain. Skin:  Negative for rash. Allergic/Immunologic: Positive for environmental allergies and immunocompromised state. Neurological:  Negative for dizziness and headaches. Hematological:  Negative for adenopathy. Psychiatric/Behavioral:  Negative for dysphoric mood. The patient is nervous/anxious. All other systems reviewed and are negative. Current Outpatient Medications on File Prior to Visit   Medication Sig Dispense Refill    FLUoxetine (PROZAC) 20 MG capsule Take 1 capsule by mouth in the morning, at noon, and at bedtime      LORazepam (ATIVAN) 0.5 MG tablet Take 1 tablet by mouth daily as needed for Anxiety.  Max Daily Amount: 0.5 mg      norethindrone-ethinyl estradiol (RAMILA 1/20) 1-20 MG-MCG per tablet Take one tablet by mouth daily 3 packet 3    lisinopril (PRINIVIL;ZESTRIL) 10 MG tablet Take 1 tablet by mouth daily 90 tablet 1    fluticasone (FLONASE) 50 MCG/ACT nasal spray USE 1 TO 2 SPRAYS IN EACH NOSTRIL EVERY MORNING 48 g 3    chlorpheniramine (ALLER-CHLOR) 4 MG tablet Take 1 tablet by mouth every 6 hours as needed for Allergies 20 tablet 0    montelukast (SINGULAIR) 10

## 2023-10-04 ENCOUNTER — TELEPHONE (OUTPATIENT)
Dept: PRIMARY CARE CLINIC | Age: 53
End: 2023-10-04

## 2023-10-04 NOTE — TELEPHONE ENCOUNTER
She does need to call her Psych NP who prescribes the medications, but if she is still feeling this poorly and feels like she needs to go to the ER, then she should

## 2023-10-04 NOTE — TELEPHONE ENCOUNTER
Spoke to [facility] and advised her of this. She just kept asking if she needed to continue weaning off medications and I advised that we didn't write them for her so she would need to contact her NP to which she said she left a message a few days ago and has not heard anything and the first time she spoke to them they told her she needed to wait for her apt. I called psych care myself and spoke with  who advised they spoke with the patient this morning and sent Sunita Skipper a message about the reactions, they advised that if she felt poorly that she would need to be seen in ED as they are not a walk in clinic.  I also advised pt that if she felt like she needed to go to the ED that she should

## 2023-10-04 NOTE — TELEPHONE ENCOUNTER
Pt calling stating 2 weeks ago she was given Trazadone but was having RXN to it so she started to wean off. She also was given Lamictal a few weeks ago and feels its keeping her up at night and causing her to have anxiety and panic attacks. She states she follows with a psych NP who prescribes her meds but she has not contacted the office to advise of the issues she is having with meds and increased anxiety. She states she would just have to leave a message for the NP and wait for a call back.  She states she feels like she should go to ER due to insomnia and anxiety but asking for you to advise

## 2023-10-16 ENCOUNTER — TELEPHONE (OUTPATIENT)
Dept: PRIMARY CARE CLINIC | Age: 53
End: 2023-10-16

## 2023-10-16 NOTE — TELEPHONE ENCOUNTER
----- Message from [address] [place] [address] sent at 10/16/2023  9:31 AM EDT -----  Subject: Message to Provider    QUESTIONS  Information for Provider? Pt wants to know if she needs lab work before   her appt on 10/24/2023. Please call pt  ---------------------------------------------------------------------------  --------------  Calixto Galvan INFO  [phone]; OK to leave message on voicemail  ---------------------------------------------------------------------------  --------------  SCRIPT ANSWERS  Relationship to Patient?  Self

## 2023-10-16 NOTE — TELEPHONE ENCOUNTER
LM advising patient that there are orders for her to have labs done for her apt - she is to call us with any questions

## 2023-10-24 ENCOUNTER — OFFICE VISIT (OUTPATIENT)
Dept: PRIMARY CARE CLINIC | Age: 53
End: 2023-10-24
Payer: COMMERCIAL

## 2023-10-24 VITALS
WEIGHT: 144 LBS | HEIGHT: 63 IN | BODY MASS INDEX: 25.52 KG/M2 | DIASTOLIC BLOOD PRESSURE: 88 MMHG | HEART RATE: 117 BPM | OXYGEN SATURATION: 98 % | SYSTOLIC BLOOD PRESSURE: 132 MMHG

## 2023-10-24 DIAGNOSIS — I10 PRIMARY HYPERTENSION: Primary | ICD-10-CM

## 2023-10-24 DIAGNOSIS — Z12.11 SCREENING FOR COLON CANCER: ICD-10-CM

## 2023-10-24 DIAGNOSIS — L20.84 INTRINSIC ECZEMA: ICD-10-CM

## 2023-10-24 DIAGNOSIS — E03.9 ACQUIRED HYPOTHYROIDISM: ICD-10-CM

## 2023-10-24 DIAGNOSIS — F41.1 GAD (GENERALIZED ANXIETY DISORDER): ICD-10-CM

## 2023-10-24 PROCEDURE — G8419 CALC BMI OUT NRM PARAM NOF/U: HCPCS | Performed by: FAMILY MEDICINE

## 2023-10-24 PROCEDURE — 3075F SYST BP GE 130 - 139MM HG: CPT | Performed by: FAMILY MEDICINE

## 2023-10-24 PROCEDURE — G8482 FLU IMMUNIZE ORDER/ADMIN: HCPCS | Performed by: FAMILY MEDICINE

## 2023-10-24 PROCEDURE — 1036F TOBACCO NON-USER: CPT | Performed by: FAMILY MEDICINE

## 2023-10-24 PROCEDURE — G8427 DOCREV CUR MEDS BY ELIG CLIN: HCPCS | Performed by: FAMILY MEDICINE

## 2023-10-24 PROCEDURE — 3079F DIAST BP 80-89 MM HG: CPT | Performed by: FAMILY MEDICINE

## 2023-10-24 PROCEDURE — 3017F COLORECTAL CA SCREEN DOC REV: CPT | Performed by: FAMILY MEDICINE

## 2023-10-24 PROCEDURE — 99214 OFFICE O/P EST MOD 30 MIN: CPT | Performed by: FAMILY MEDICINE

## 2023-10-24 RX ORDER — BUDESONIDE AND FORMOTEROL FUMARATE DIHYDRATE 160; 4.5 UG/1; UG/1
AEROSOL RESPIRATORY (INHALATION)
COMMUNITY
Start: 2023-10-11 | End: 2023-10-24

## 2023-10-24 RX ORDER — DOXEPIN HYDROCHLORIDE 25 MG/1
CAPSULE ORAL
COMMUNITY
Start: 2023-10-10

## 2023-10-24 RX ORDER — LEVOTHYROXINE SODIUM 0.1 MG/1
TABLET ORAL
Qty: 90 TABLET | Refills: 1 | Status: SHIPPED | OUTPATIENT
Start: 2023-10-24

## 2023-10-24 RX ORDER — FLUOXETINE HYDROCHLORIDE 40 MG/1
40 CAPSULE ORAL DAILY
COMMUNITY
Start: 2023-09-22 | End: 2023-10-24

## 2023-10-24 RX ORDER — OLOPATADINE HYDROCHLORIDE AND MOMETASONE FUROATE 25; 665 UG/1; UG/1
SPRAY, METERED NASAL
COMMUNITY
Start: 2023-10-18

## 2023-10-24 ASSESSMENT — ENCOUNTER SYMPTOMS
CONSTIPATION: 0
NAUSEA: 0
ABDOMINAL PAIN: 0
COUGH: 0
DIARRHEA: 0
VOMITING: 0
WHEEZING: 0
SHORTNESS OF BREATH: 0
BACK PAIN: 0

## 2023-10-24 NOTE — PROGRESS NOTES
10/24/23  Lorraine Reading : 1970 Sex: female  Age: 48 y.o. Chief Complaint   Patient presents with    Medication Check     HPI:  48 y.o. female presents today for 1 month follow up of chronic medical conditions, medication refills and FBW results. Patient's chart, medical, surgical and medication history all reviewed. Hypothyroidism  Patient presents for routine follow up of Hypothyroidism. Current symptoms: none. Patient denies change in energy level, diarrhea, heat / cold intolerance, nervousness, palpitations and weight changes. Symptoms have been well-controlled. No difficulty swallowing or masses felt. Last TSH was 1.21. Patient is currently taking Levothyroxine 100 mcg. She has history of CVID. Takes Biaxin daily. Follows with Dr. Chris Isabel for management. Hypertension   The patient presents today for follow up of HTN. The problem is well controlled, improved. Risk factors for coronary artery disease include Age > 30 and HTN. Current treatments include Lisinopril. Lifestyle changes the patient has made include  none . Today the patient is complaining of none. Mood disorder  Patient has a long history of anxiety, depression and mood disorder. On Autism spectrum. In 2023, she was seen in the ER because she was having suicidal thoughts with intent to drive to [facility] and jump off a bridge. Admitted to the Psych unit. Prozac was discontinued. Trialed on Risperdone and Trileptal but ended up having a syncopal episode. Both medications were stopped. She was again seen in the ER in 2023 for suicidal ideation and had medications changed. Most recently on Prozac and Rexulti, but had to stop Rexulti due to side effects. Then trialed on Lamictal but had severe worsening of anxiety. Now on Doxepin at night and having improvement in symptoms. ROS:  Review of Systems   Constitutional:  Negative for appetite change, chills, fatigue and fever.    Respiratory:

## 2023-10-25 ENCOUNTER — OFFICE VISIT (OUTPATIENT)
Dept: ORTHOPEDIC SURGERY | Age: 53
End: 2023-10-25

## 2023-10-25 DIAGNOSIS — M25.552 PAIN OF LEFT HIP: Primary | ICD-10-CM

## 2023-10-25 RX ORDER — TRIAMCINOLONE ACETONIDE 40 MG/ML
40 INJECTION, SUSPENSION INTRA-ARTICULAR; INTRAMUSCULAR ONCE
Status: COMPLETED | OUTPATIENT
Start: 2023-10-25 | End: 2023-10-25

## 2023-10-25 RX ORDER — LIDOCAINE HYDROCHLORIDE 10 MG/ML
5 INJECTION, SOLUTION INFILTRATION; PERINEURAL ONCE
Status: COMPLETED | OUTPATIENT
Start: 2023-10-25 | End: 2023-10-25

## 2023-10-25 RX ADMIN — TRIAMCINOLONE ACETONIDE 40 MG: 40 INJECTION, SUSPENSION INTRA-ARTICULAR; INTRAMUSCULAR at 13:00

## 2023-10-25 RX ADMIN — LIDOCAINE HYDROCHLORIDE 5 ML: 10 INJECTION, SOLUTION INFILTRATION; PERINEURAL at 12:59

## 2023-10-27 ENCOUNTER — TELEPHONE (OUTPATIENT)
Dept: PRIMARY CARE CLINIC | Age: 53
End: 2023-10-27

## 2023-10-27 NOTE — TELEPHONE ENCOUNTER
Patient is asking if you recommend that she get the shingles vaccine. Just received Covid vaccine on Wednesday and fu shot 3 weeks ago. Did have chicken pox when she was around 6.

## 2023-11-09 LAB — NONINV COLON CA DNA+OCC BLD SCRN STL QL: NEGATIVE

## 2023-11-15 RX ORDER — MONTELUKAST SODIUM 5 MG/1
TABLET, CHEWABLE ORAL
Qty: 90 TABLET | OUTPATIENT
Start: 2023-11-15

## 2023-11-28 ENCOUNTER — TELEPHONE (OUTPATIENT)
Dept: PRIMARY CARE CLINIC | Age: 53
End: 2023-11-28

## 2023-11-28 NOTE — TELEPHONE ENCOUNTER
I don't believe the patient is taking Lamictal any longer. I think it was discontinued.   Can we check with patient to confirm drug list and then let [facility] know if she is no longer on Lamictal

## 2023-11-28 NOTE — TELEPHONE ENCOUNTER
The pt says she is no longer on the lamictal [facility]

## 2023-11-28 NOTE — TELEPHONE ENCOUNTER
[facility] is calling because it is time for the pt to refill her norethindrone-ethinyl estradiol (RAMILA 1/20) 1-20 MG-MCG per tablet but the pt filled a 90 day script in September for lamotrigine from another doctor named Dr Yoni Rojas, if taken together it can lower the effectiveness of both medications he wanted to make sure you were okay with the pt still talking that birth control [facility] # [phone]   Ref # [id number]

## 2023-11-29 DIAGNOSIS — Z30.41 ENCOUNTER FOR SURVEILLANCE OF CONTRACEPTIVE PILLS: ICD-10-CM

## 2023-11-29 RX ORDER — NORETHINDRONE ACETATE AND ETHINYL ESTRADIOL 1; .02 MG/1; MG/1
1 TABLET ORAL DAILY
Qty: 4 PACKET | Refills: 3 | Status: SHIPPED | OUTPATIENT
Start: 2023-11-29

## 2023-11-29 RX ORDER — NORETHINDRONE ACETATE AND ETHINYL ESTRADIOL 1; .02 MG/1; MG/1
TABLET ORAL
Qty: 1 PACKET | Refills: 0 | Status: SHIPPED | OUTPATIENT
Start: 2023-11-29

## 2023-11-29 NOTE — TELEPHONE ENCOUNTER
Due to taking birth control continuously pharmacy is asking for 4 packs instead of 3 sent to [facility]. She is going to run out before it comes in the mail asking for one month supply sent to [facility] [place] [address]  [facility] to [facility]

## 2023-12-14 DIAGNOSIS — Z30.41 ENCOUNTER FOR SURVEILLANCE OF CONTRACEPTIVE PILLS: ICD-10-CM

## 2023-12-14 RX ORDER — NORETHINDRONE ACETATE AND ETHINYL ESTRADIOL .02; 1 MG/1; MG/1
TABLET ORAL
Qty: 21 TABLET | OUTPATIENT
Start: 2023-12-14

## 2024-01-30 RX ORDER — NORETHINDRONE ACETATE AND ETHINYL ESTRADIOL .02; 1 MG/1; MG/1
1 TABLET ORAL DAILY
Qty: 1 PACKET | Refills: 0 | Status: SHIPPED | OUTPATIENT
Start: 2024-01-30

## 2024-01-30 RX ORDER — NORETHINDRONE ACETATE AND ETHINYL ESTRADIOL .02; 1 MG/1; MG/1
1 TABLET ORAL DAILY
Qty: 4 PACKET | Refills: 3 | Status: SHIPPED | OUTPATIENT
Start: 2024-01-30

## 2024-01-30 NOTE — TELEPHONE ENCOUNTER
Pt calling asking for a script for her birth control. Pt states she has 4 pills left and is asking for a script to say that she does not take a week off of the medication for a period she takes continuous birth control. Pt asking if she can have the refill it for a 4 pack of 21 rather than 3 packs of the week off and pt asking for continuous medication. Pt asking for a script to be sent to [facility] so she can get the medication and then send the rest of the script to be [facility] to [facility] if possible.

## 2024-02-05 ENCOUNTER — TELEPHONE (OUTPATIENT)
Dept: PRIMARY CARE CLINIC | Age: 54
End: 2024-02-05

## 2024-02-05 NOTE — TELEPHONE ENCOUNTER
Pt can't get the RSV vaccine through the pharmacy, they wont give it to her because she's under 60 and not pregnant, pt asking if she can get it here?

## 2024-02-08 ENCOUNTER — OFFICE VISIT (OUTPATIENT)
Dept: PRIMARY CARE CLINIC | Age: 54
End: 2024-02-08
Payer: COMMERCIAL

## 2024-02-08 VITALS
SYSTOLIC BLOOD PRESSURE: 126 MMHG | DIASTOLIC BLOOD PRESSURE: 80 MMHG | HEART RATE: 104 BPM | BODY MASS INDEX: 27.11 KG/M2 | HEIGHT: 63 IN | WEIGHT: 153 LBS | OXYGEN SATURATION: 98 % | TEMPERATURE: 98.6 F

## 2024-02-08 DIAGNOSIS — J01.00 ACUTE NON-RECURRENT MAXILLARY SINUSITIS: Primary | ICD-10-CM

## 2024-02-08 PROCEDURE — G8427 DOCREV CUR MEDS BY ELIG CLIN: HCPCS | Performed by: FAMILY MEDICINE

## 2024-02-08 PROCEDURE — G8482 FLU IMMUNIZE ORDER/ADMIN: HCPCS | Performed by: FAMILY MEDICINE

## 2024-02-08 PROCEDURE — G8419 CALC BMI OUT NRM PARAM NOF/U: HCPCS | Performed by: FAMILY MEDICINE

## 2024-02-08 PROCEDURE — 99213 OFFICE O/P EST LOW 20 MIN: CPT | Performed by: FAMILY MEDICINE

## 2024-02-08 PROCEDURE — 3017F COLORECTAL CA SCREEN DOC REV: CPT | Performed by: FAMILY MEDICINE

## 2024-02-08 PROCEDURE — 3079F DIAST BP 80-89 MM HG: CPT | Performed by: FAMILY MEDICINE

## 2024-02-08 PROCEDURE — 3074F SYST BP LT 130 MM HG: CPT | Performed by: FAMILY MEDICINE

## 2024-02-08 PROCEDURE — 1036F TOBACCO NON-USER: CPT | Performed by: FAMILY MEDICINE

## 2024-02-08 RX ORDER — TRIAMCINOLONE ACETONIDE 5 MG/G
OINTMENT TOPICAL
COMMUNITY
Start: 2023-12-05

## 2024-02-08 RX ORDER — BUDESONIDE AND FORMOTEROL FUMARATE DIHYDRATE 160; 4.5 UG/1; UG/1
AEROSOL RESPIRATORY (INHALATION)
COMMUNITY
Start: 2024-01-06

## 2024-02-08 RX ORDER — FLUTICASONE PROPIONATE 50 MCG
SPRAY, SUSPENSION (ML) NASAL
COMMUNITY
Start: 2024-01-20

## 2024-02-08 RX ORDER — AMOXICILLIN AND CLAVULANATE POTASSIUM 875; 125 MG/1; MG/1
1 TABLET, FILM COATED ORAL 2 TIMES DAILY
Qty: 14 TABLET | Refills: 0 | Status: SHIPPED | OUTPATIENT
Start: 2024-02-08 | End: 2024-02-15

## 2024-02-08 RX ORDER — FLUOXETINE HYDROCHLORIDE 40 MG/1
40 CAPSULE ORAL DAILY
COMMUNITY
Start: 2023-11-28

## 2024-02-08 ASSESSMENT — PATIENT HEALTH QUESTIONNAIRE - PHQ9
1. LITTLE INTEREST OR PLEASURE IN DOING THINGS: 0
SUM OF ALL RESPONSES TO PHQ9 QUESTIONS 1 & 2: 0
9. THOUGHTS THAT YOU WOULD BE BETTER OFF DEAD, OR OF HURTING YOURSELF: 0
SUM OF ALL RESPONSES TO PHQ QUESTIONS 1-9: 0
3. TROUBLE FALLING OR STAYING ASLEEP: 0
10. IF YOU CHECKED OFF ANY PROBLEMS, HOW DIFFICULT HAVE THESE PROBLEMS MADE IT FOR YOU TO DO YOUR WORK, TAKE CARE OF THINGS AT HOME, OR GET ALONG WITH OTHER PEOPLE: 0
SUM OF ALL RESPONSES TO PHQ QUESTIONS 1-9: 0
6. FEELING BAD ABOUT YOURSELF - OR THAT YOU ARE A FAILURE OR HAVE LET YOURSELF OR YOUR FAMILY DOWN: 0
8. MOVING OR SPEAKING SO SLOWLY THAT OTHER PEOPLE COULD HAVE NOTICED. OR THE OPPOSITE, BEING SO FIGETY OR RESTLESS THAT YOU HAVE BEEN MOVING AROUND A LOT MORE THAN USUAL: 0
SUM OF ALL RESPONSES TO PHQ QUESTIONS 1-9: 0
2. FEELING DOWN, DEPRESSED OR HOPELESS: 0
5. POOR APPETITE OR OVEREATING: 0
7. TROUBLE CONCENTRATING ON THINGS, SUCH AS READING THE NEWSPAPER OR WATCHING TELEVISION: 0
4. FEELING TIRED OR HAVING LITTLE ENERGY: 0
SUM OF ALL RESPONSES TO PHQ QUESTIONS 1-9: 0

## 2024-02-08 ASSESSMENT — ENCOUNTER SYMPTOMS
SINUS PAIN: 0
ABDOMINAL PAIN: 0
DIARRHEA: 0
SHORTNESS OF BREATH: 0
SORE THROAT: 0
VOMITING: 0
RHINORRHEA: 0
COUGH: 0
EYE DISCHARGE: 0
SINUS PRESSURE: 1
CONSTIPATION: 0
BACK PAIN: 0
NAUSEA: 0
WHEEZING: 0

## 2024-02-08 NOTE — PROGRESS NOTES
rhinorrhea.      Right Sinus: No maxillary sinus tenderness or frontal sinus tenderness.      Left Sinus: No maxillary sinus tenderness or frontal sinus tenderness.      Mouth/Throat:      Mouth: Mucous membranes are moist.      Pharynx: Posterior oropharyngeal erythema present. No oropharyngeal exudate.   Eyes:      General: Lids are normal. No scleral icterus.        Right eye: No discharge.         Left eye: No discharge.      Extraocular Movements: Extraocular movements intact.      Conjunctiva/sclera: Conjunctivae normal.   Neck:      Thyroid: No thyromegaly.   Cardiovascular:      Rate and Rhythm: Normal rate and regular rhythm.      Heart sounds: Normal heart sounds. No murmur heard.  Pulmonary:      Effort: Pulmonary effort is normal. No respiratory distress.      Breath sounds: Normal breath sounds. No wheezing.   Musculoskeletal:         General: No tenderness or deformity. Normal range of motion.      Cervical back: Normal range of motion and neck supple. No tenderness.      Right lower leg: No edema.      Left lower leg: No edema.   Lymphadenopathy:      Cervical: No cervical adenopathy.   Skin:     General: Skin is warm and dry.      Findings: No rash.   Neurological:      General: No focal deficit present.      Mental Status: She is alert and oriented to person, place, and time.      Gait: Gait normal.   Psychiatric:         Mood and Affect: Mood and affect normal.         Speech: Speech normal.         Behavior: Behavior normal.         Thought Content: Thought content normal.         Labs:  CBC with Differential:    Lab Results   Component Value Date/Time    WBC 6.6 10/20/2023 10:52 AM    RBC 4.86 10/20/2023 10:52 AM    HGB 14.6 10/20/2023 10:52 AM    HCT 45.7 10/20/2023 10:52 AM     10/20/2023 10:52 AM    MCV 94.0 10/20/2023 10:52 AM    MCH 30.0 10/20/2023 10:52 AM    MCHC 31.9 10/20/2023 10:52 AM    RDW 12.6 10/20/2023 10:52 AM    SEGSPCT 63 09/05/2012 09:20 PM    LYMPHOPCT 23 10/20/2023

## 2024-02-12 RX ORDER — NORETHINDRONE ACETATE AND ETHINYL ESTRADIOL .02; 1 MG/1; MG/1
1 TABLET ORAL DAILY
Qty: 21 TABLET | OUTPATIENT
Start: 2024-02-12

## 2024-02-13 ENCOUNTER — TELEPHONE (OUTPATIENT)
Dept: PRIMARY CARE CLINIC | Age: 54
End: 2024-02-13

## 2024-02-13 RX ORDER — AZITHROMYCIN 250 MG/1
TABLET, FILM COATED ORAL
Qty: 6 TABLET | Refills: 0 | Status: SHIPPED | OUTPATIENT
Start: 2024-02-13 | End: 2024-02-18

## 2024-02-13 NOTE — TELEPHONE ENCOUNTER
The pt was here to see you 02/08 for congestion and was given a script for amoxicillin-clavulanate (AUGMENTIN) 875-125 MG, she contacted you on Sunday because she was having some side effects from the antibiotic and you advised her to stop taking it, she is calling because she is still having the congestion and is asking if something else can be sent over to the pharmacy for her

## 2024-03-04 RX ORDER — MONTELUKAST SODIUM 10 MG/1
TABLET ORAL
Qty: 90 TABLET | Refills: 3 | Status: SHIPPED | OUTPATIENT
Start: 2024-03-04

## 2024-03-05 ENCOUNTER — LAB REQUISITION (OUTPATIENT)
Dept: LAB | Facility: HOSPITAL | Age: 54
End: 2024-03-05

## 2024-03-05 DIAGNOSIS — R19.7 DIARRHEA, UNSPECIFIED: ICD-10-CM

## 2024-03-19 ENCOUNTER — TELEPHONE (OUTPATIENT)
Dept: PRIMARY CARE CLINIC | Age: 54
End: 2024-03-19

## 2024-04-18 ENCOUNTER — OFFICE VISIT (OUTPATIENT)
Dept: ORTHOPEDIC SURGERY | Age: 54
End: 2024-04-18

## 2024-04-18 VITALS — WEIGHT: 153 LBS | BODY MASS INDEX: 27.11 KG/M2 | HEIGHT: 63 IN

## 2024-04-18 DIAGNOSIS — M53.3 CHRONIC LEFT SI JOINT PAIN: Primary | ICD-10-CM

## 2024-04-18 DIAGNOSIS — G89.29 CHRONIC LEFT SI JOINT PAIN: Primary | ICD-10-CM

## 2024-04-18 RX ORDER — LIDOCAINE HYDROCHLORIDE 10 MG/ML
5 INJECTION, SOLUTION INFILTRATION; PERINEURAL ONCE
Status: COMPLETED | OUTPATIENT
Start: 2024-04-18 | End: 2024-04-18

## 2024-04-18 RX ORDER — TRIAMCINOLONE ACETONIDE 40 MG/ML
40 INJECTION, SUSPENSION INTRA-ARTICULAR; INTRAMUSCULAR ONCE
Status: COMPLETED | OUTPATIENT
Start: 2024-04-18 | End: 2024-04-18

## 2024-04-18 RX ADMIN — TRIAMCINOLONE ACETONIDE 40 MG: 40 INJECTION, SUSPENSION INTRA-ARTICULAR; INTRAMUSCULAR at 11:51

## 2024-04-18 RX ADMIN — LIDOCAINE HYDROCHLORIDE 5 ML: 10 INJECTION, SOLUTION INFILTRATION; PERINEURAL at 11:51

## 2024-04-18 NOTE — PROGRESS NOTES
PROCEDURE NOTE:    DIAGNOSIS      LEFT sacroiliac joint pain    PROCEDURE     Ultrasound-guided LEFT sacroiliac joint corticosteroid injection.     PROCEDURAL PAUSE     Procedural pause conducted to verify: ?correct patient identity, procedure to be performed, and as applicable, correct side and site, correct patient position, and availability of implants, special equipment, or special requirements.     PROCEDURE DETAILS     The procedure was carried out under sterile technique.      Patient Position: ?Prone.     Localization Process: ?The sacroiliac joint was evaluated under ultrasound prior to starting the procedure. ?The skin was prepped with Betadine and Alcohol.    Approach: ?Out-of-plane.     Local Anesthesia: Under live ultrasound guidance, local anesthesia was obtained with vapocoolant cold spray and 2 cc of 1% lidocaine using a 25-gauge 2-inch needle advanced from an out-of-plane approach to the sacroiliac joint. ?     Injection/Aspiration: ?A 22-gauge 3-1/2-inch needle was advanced from an out-of-plane approach into the sacroiliac joint. ?After visualization of the needle tip in the target area and negative aspiration for blood, a mixture of 3 cc of 1% lidocaine and 1 cc of Kenalog (40 mg/cc) was injected into the sacroiliac joint with excellent sonographic flow. Images of procedure were permanently recorded.    Postprocedure Care: ?The patient will avoid soaking the injection site under water for two days and avoid heavy exertion with the hip. ?The patient will contact me with any problems related to the injection.     PATIENT EDUCATION     Ready to learn, no apparent learning barriers were identified; learning preferences include listening. ?Explained diagnosis and treatment plan; patient expressed understanding of the content.     INFORMED CONSENT     Following denial of allergy and review of potential side effects and complications including but not necessarily limited to infection, allergic reaction,

## 2024-05-15 DIAGNOSIS — I10 PRIMARY HYPERTENSION: ICD-10-CM

## 2024-05-15 RX ORDER — LISINOPRIL 10 MG/1
10 TABLET ORAL DAILY
Qty: 90 TABLET | Refills: 0 | Status: SHIPPED | OUTPATIENT
Start: 2024-05-15

## 2024-06-10 SDOH — ECONOMIC STABILITY: FOOD INSECURITY: WITHIN THE PAST 12 MONTHS, YOU WORRIED THAT YOUR FOOD WOULD RUN OUT BEFORE YOU GOT MONEY TO BUY MORE.: NEVER TRUE

## 2024-06-10 SDOH — ECONOMIC STABILITY: TRANSPORTATION INSECURITY
IN THE PAST 12 MONTHS, HAS LACK OF TRANSPORTATION KEPT YOU FROM MEETINGS, WORK, OR FROM GETTING THINGS NEEDED FOR DAILY LIVING?: NO

## 2024-06-10 SDOH — ECONOMIC STABILITY: FOOD INSECURITY: WITHIN THE PAST 12 MONTHS, THE FOOD YOU BOUGHT JUST DIDN'T LAST AND YOU DIDN'T HAVE MONEY TO GET MORE.: NEVER TRUE

## 2024-06-10 SDOH — ECONOMIC STABILITY: INCOME INSECURITY: HOW HARD IS IT FOR YOU TO PAY FOR THE VERY BASICS LIKE FOOD, HOUSING, MEDICAL CARE, AND HEATING?: PATIENT DECLINED

## 2024-06-10 SDOH — HEALTH STABILITY: PHYSICAL HEALTH: ON AVERAGE, HOW MANY MINUTES DO YOU ENGAGE IN EXERCISE AT THIS LEVEL?: 60 MIN

## 2024-06-10 SDOH — HEALTH STABILITY: PHYSICAL HEALTH: ON AVERAGE, HOW MANY DAYS PER WEEK DO YOU ENGAGE IN MODERATE TO STRENUOUS EXERCISE (LIKE A BRISK WALK)?: 4 DAYS

## 2024-06-10 ASSESSMENT — PATIENT HEALTH QUESTIONNAIRE - PHQ9
1. LITTLE INTEREST OR PLEASURE IN DOING THINGS: NOT AT ALL
9. THOUGHTS THAT YOU WOULD BE BETTER OFF DEAD, OR OF HURTING YOURSELF: NOT AT ALL
6. FEELING BAD ABOUT YOURSELF - OR THAT YOU ARE A FAILURE OR HAVE LET YOURSELF OR YOUR FAMILY DOWN: NOT AT ALL
SUM OF ALL RESPONSES TO PHQ9 QUESTIONS 1 & 2: 0
8. MOVING OR SPEAKING SO SLOWLY THAT OTHER PEOPLE COULD HAVE NOTICED. OR THE OPPOSITE, BEING SO FIGETY OR RESTLESS THAT YOU HAVE BEEN MOVING AROUND A LOT MORE THAN USUAL: NOT AT ALL
SUM OF ALL RESPONSES TO PHQ QUESTIONS 1-9: 0
4. FEELING TIRED OR HAVING LITTLE ENERGY: NOT AT ALL
SUM OF ALL RESPONSES TO PHQ QUESTIONS 1-9: 0
5. POOR APPETITE OR OVEREATING: NOT AT ALL
7. TROUBLE CONCENTRATING ON THINGS, SUCH AS READING THE NEWSPAPER OR WATCHING TELEVISION: NOT AT ALL
2. FEELING DOWN, DEPRESSED OR HOPELESS: NOT AT ALL

## 2024-06-10 ASSESSMENT — LIFESTYLE VARIABLES
HOW OFTEN DO YOU HAVE A DRINK CONTAINING ALCOHOL: NEVER
HOW OFTEN DO YOU HAVE A DRINK CONTAINING ALCOHOL: 1
HOW MANY STANDARD DRINKS CONTAINING ALCOHOL DO YOU HAVE ON A TYPICAL DAY: PATIENT DOES NOT DRINK
HOW MANY STANDARD DRINKS CONTAINING ALCOHOL DO YOU HAVE ON A TYPICAL DAY: 0
HOW OFTEN DO YOU HAVE SIX OR MORE DRINKS ON ONE OCCASION: 1

## 2024-06-13 ENCOUNTER — OFFICE VISIT (OUTPATIENT)
Dept: PRIMARY CARE CLINIC | Age: 54
End: 2024-06-13
Payer: COMMERCIAL

## 2024-06-13 VITALS
BODY MASS INDEX: 28 KG/M2 | WEIGHT: 158 LBS | SYSTOLIC BLOOD PRESSURE: 132 MMHG | DIASTOLIC BLOOD PRESSURE: 80 MMHG | HEART RATE: 104 BPM | HEIGHT: 63 IN | OXYGEN SATURATION: 96 % | TEMPERATURE: 97.5 F

## 2024-06-13 DIAGNOSIS — I10 PRIMARY HYPERTENSION: ICD-10-CM

## 2024-06-13 DIAGNOSIS — E03.9 ACQUIRED HYPOTHYROIDISM: ICD-10-CM

## 2024-06-13 DIAGNOSIS — R73.01 IMPAIRED FASTING GLUCOSE: ICD-10-CM

## 2024-06-13 DIAGNOSIS — Z00.00 INITIAL MEDICARE ANNUAL WELLNESS VISIT: Primary | ICD-10-CM

## 2024-06-13 DIAGNOSIS — E55.9 VITAMIN D INSUFFICIENCY: ICD-10-CM

## 2024-06-13 PROCEDURE — 3017F COLORECTAL CA SCREEN DOC REV: CPT | Performed by: FAMILY MEDICINE

## 2024-06-13 PROCEDURE — G0438 PPPS, INITIAL VISIT: HCPCS | Performed by: FAMILY MEDICINE

## 2024-06-13 PROCEDURE — 3075F SYST BP GE 130 - 139MM HG: CPT | Performed by: FAMILY MEDICINE

## 2024-06-13 PROCEDURE — 3079F DIAST BP 80-89 MM HG: CPT | Performed by: FAMILY MEDICINE

## 2024-06-13 RX ORDER — OXYMETAZOLINE HYDROCHLORIDE 0.05 G/100ML
2 SPRAY NASAL 2 TIMES DAILY
COMMUNITY

## 2024-06-13 RX ORDER — TRIAMCINOLONE ACETONIDE 55 UG/1
2 SPRAY, METERED NASAL DAILY
COMMUNITY

## 2024-06-13 RX ORDER — LEVOTHYROXINE SODIUM 0.1 MG/1
TABLET ORAL
Qty: 90 TABLET | Refills: 1 | Status: SHIPPED | OUTPATIENT
Start: 2024-06-13

## 2024-06-13 RX ORDER — LISINOPRIL 10 MG/1
10 TABLET ORAL DAILY
Qty: 90 TABLET | Refills: 1 | Status: SHIPPED | OUTPATIENT
Start: 2024-06-13

## 2024-06-13 RX ORDER — IPRATROPIUM BROMIDE 21 UG/1
2 SPRAY, METERED NASAL 3 TIMES DAILY
COMMUNITY
Start: 2024-06-04

## 2024-06-13 NOTE — PROGRESS NOTES
(PROBIOTIC DAILY PO) Take by mouth LD 4/6/16  Provider, MD Nicol   Omega-3 Fatty Acids (FISH OIL PO) Take by mouth LD 4/6/16  ProviderNicol MD   azelastine (ASTELIN) 137 MCG/SPRAY nasal spray 1 spray by Nasal route 2 times daily Use in each nostril as directed  Provider, MD Nicol       CareTeam (Including outside providers/suppliers regularly involved in providing care):   Patient Care Team:  Petra Olmedo DO as PCP - General (Family Medicine)  Petra Olmedo DO as PCP - Empaneled Provider     Reviewed and updated this visit:  Tobacco  Allergies  Meds  Problems  Med Hx  Surg Hx  Soc Hx  Fam Hx

## 2024-06-13 NOTE — PATIENT INSTRUCTIONS
have a serious illness that gets worse over time or can't be cured?  What are you most afraid of that might happen? (Maybe you're afraid of having pain, losing your independence, or being kept alive by machines.)  Where would you prefer to die? (Your home? A hospital? A nursing home?)  Do you want to donate your organs when you die?  Do you want certain Caodaism practices performed before you die?  When should you call for help?  Be sure to contact your doctor if you have any questions.  Where can you learn more?  Go to [email or web address]/patientEd and enter R264 to learn more about \"Advance Directives: Care Instructions.\"  Current as of: November 16, 2023  Content Version: 14.1  © 8041-2823 [employer].   Care instructions adapted under license by [employer] If you have questions about a medical condition or this instruction, always ask your healthcare professional. [employer] disclaims any warranty or liability for your use of this information.      Personalized Preventive Plan for Ama Juarez - 6/13/2024  Medicare offers a range of preventive health benefits. Some of the tests and screenings are paid in full while other may be subject to a deductible, co-insurance, and/or copay.    Some of these benefits include a comprehensive review of your medical history including lifestyle, illnesses that may run in your family, and various assessments and screenings as appropriate.    After reviewing your medical record and screening and assessments performed today your provider may have ordered immunizations, labs, imaging, and/or referrals for you.  A list of these orders (if applicable) as well as your Preventive Care list are included within your After Visit Summary for your review.    Other Preventive Recommendations:    A preventive eye exam performed by an eye specialist is recommended every 1-2 years to screen for glaucoma; cataracts, macular degeneration, and other eye

## 2024-07-06 RX ORDER — BUDESONIDE AND FORMOTEROL FUMARATE DIHYDRATE 160; 4.5 UG/1; UG/1
AEROSOL RESPIRATORY (INHALATION)
Qty: 30.6 G | Refills: 3 | Status: SHIPPED | OUTPATIENT
Start: 2024-07-06

## 2024-07-18 RX ORDER — FLUTICASONE PROPIONATE 50 MCG
SPRAY, SUSPENSION (ML) NASAL
Qty: 48 G | Refills: 3 | OUTPATIENT
Start: 2024-07-18

## 2024-08-14 ENCOUNTER — OFFICE VISIT (OUTPATIENT)
Dept: ORTHOPEDIC SURGERY | Age: 54
End: 2024-08-14

## 2024-08-14 VITALS — WEIGHT: 158 LBS | HEIGHT: 63 IN | BODY MASS INDEX: 28 KG/M2

## 2024-08-14 DIAGNOSIS — G89.29 CHRONIC LEFT SI JOINT PAIN: Primary | ICD-10-CM

## 2024-08-14 DIAGNOSIS — M53.3 CHRONIC LEFT SI JOINT PAIN: Primary | ICD-10-CM

## 2024-08-14 RX ORDER — TRIAMCINOLONE ACETONIDE 40 MG/ML
40 INJECTION, SUSPENSION INTRA-ARTICULAR; INTRAMUSCULAR ONCE
Status: COMPLETED | OUTPATIENT
Start: 2024-08-14 | End: 2024-08-14

## 2024-08-14 RX ORDER — LIDOCAINE HYDROCHLORIDE 10 MG/ML
5 INJECTION, SOLUTION INFILTRATION; PERINEURAL ONCE
Status: COMPLETED | OUTPATIENT
Start: 2024-08-14 | End: 2024-08-14

## 2024-08-14 RX ADMIN — TRIAMCINOLONE ACETONIDE 40 MG: 40 INJECTION, SUSPENSION INTRA-ARTICULAR; INTRAMUSCULAR at 13:21

## 2024-08-14 RX ADMIN — LIDOCAINE HYDROCHLORIDE 5 ML: 10 INJECTION, SOLUTION INFILTRATION; PERINEURAL at 13:21

## 2024-08-26 ENCOUNTER — TELEPHONE (OUTPATIENT)
Dept: PRIMARY CARE CLINIC | Age: 54
End: 2024-08-26

## 2024-08-26 NOTE — TELEPHONE ENCOUNTER
Spoke to patient and advised that she had a physical in June. I advised labs were ordered for her to get done and that she had a 6m follow up en ortiz in dec

## 2024-08-26 NOTE — TELEPHONE ENCOUNTER
----- Message from Tyrone ALSTON sent at 8/26/2024  8:10 AM EDT -----  Regarding: [facility] Appointment Request  [facility] Appointment Request    Patient needs appointment for [facility] Appointment Type: Annual Visit.    Patient Requested Dates(s): by the end of October, starting the 28th  Patient Requested Time: flexible with the time, preferably in the morning  Provider Name: Petra Olmedo DO    Reason for Appointment Request: Established Patient - No appointments available during search  --------------------------------------------------------------------------------------------------------------------------    Relationship to Patient: Self     Call Back Information: OK to leave message on voicemail  Preferred Call Back Number: Phone [phone]

## 2024-09-24 RX ORDER — TRIAMCINOLONE ACETONIDE 5 MG/G
OINTMENT TOPICAL
Qty: 15 G | Refills: 2 | Status: SHIPPED | OUTPATIENT
Start: 2024-09-24

## 2024-10-21 ENCOUNTER — HOSPITAL ENCOUNTER (OUTPATIENT)
Dept: RADIOLOGY | Facility: HOSPITAL | Age: 54
Discharge: HOME | End: 2024-10-21
Payer: COMMERCIAL

## 2024-10-21 DIAGNOSIS — J31.0 CHRONIC RHINITIS: ICD-10-CM

## 2024-10-21 PROCEDURE — 70486 CT MAXILLOFACIAL W/O DYE: CPT

## 2024-10-21 PROCEDURE — 70486 CT MAXILLOFACIAL W/O DYE: CPT | Performed by: RADIOLOGY

## 2024-11-04 RX ORDER — NORETHINDRONE ACETATE AND ETHINYL ESTRADIOL .02; 1 MG/1; MG/1
1 TABLET ORAL DAILY
Qty: 1 PACKET | Refills: 0 | Status: SHIPPED | OUTPATIENT
Start: 2024-11-04

## 2024-11-04 NOTE — TELEPHONE ENCOUNTER
Pt is calling to see if she can get 1 packet sent over to the local pharmacy till her mail order comes in

## 2024-11-05 RX ORDER — TRIAMCINOLONE ACETONIDE 5 MG/G
OINTMENT TOPICAL
Qty: 3 EACH | Refills: 1 | Status: SHIPPED | OUTPATIENT
Start: 2024-11-05

## 2024-11-06 RX ORDER — NORETHINDRONE ACETATE AND ETHINYL ESTRADIOL .02; 1 MG/1; MG/1
1 TABLET ORAL DAILY
Qty: 21 TABLET | OUTPATIENT
Start: 2024-11-06

## 2024-11-08 NOTE — PROGRESS NOTES
Sinus & Skull Base Surgery    Chief Complaint:  Nasal airway obstruction/headaches    History Of Present Illness:  Reason For Visit:     Faith Robert was referred to me by Dr. Gaspar for sinus problems and sinus headaches.      In January 2024 she felt tired and thought she had an upper respiratory tract infection and presented to an urgent care.  She was not given antibiotic therapy at that time but encouraged to do supportive things such as drink lots of fluids.  Later, she saw Dr. Gaspar and she has been given a number of medical therapies through him for nasal congestion.  Because of persistence of symptoms, she underwent a noncontrast CT sinus in October that we reviewed together today.    She has a history of autism.  She has a history of allergies and underwent immunotherapy for more than a decade ending in 2021.  Repeat testing at that time was negative.    She has been given extensive previous intranasal medical therapy but she does not recall all the names of the medical therapy she was prescribed.    She also mentioned a recent elevation in frequency of headaches and migraines.  She questions whether or not this is related to stress surrounding a recent move she has had.    Main Symptoms:  Patient does not have anterior nasal drainage.     Patient does not have posterior nasal drainage.    Patient has nasal airway obstruction.  Bilaterally.    Patient does not have facial pain.    Patient does not have facial pressure.    Patient does not have decreased sense of smell.   Associated Symptoms:   Patient has headaches.  In the temples; she has not been evaluated by neurology.  Patient does not have throat clearing.    Patient does not have coughing.    Patient does not have dysphonia.   Patient does not have nasal bleeding.    Medications currently on for sinonasal symptoms:  Xhance 1 puff each side BID  Saline rinses BID    Medications tried in the past for sinonasal  symptoms:  Biaxin    Other Pertinent Medical Conditions:   Patient does not have asthma.    Patient does not have aspirin sensitivity.    Patient has migraines.  Has had some with stress.  Patient has history of allergy testing. When: Sept 2021 (-)  Patient has history of IT.  [id number].  Patient does not have history of sinus surgery.    Patient does not have history of nasal fracture.    Patient does not have heartburn.    The patient does not take medical therapy for heartburn.   The patient has imaging of sinuses. When: October 2024.    The remainder of a full 10 systems review of systems was pan negative outside of that stated in the history of present illness.    Active Problems:  Nasal congestion.    Past Medical History:  Autism.  Allergic rhinitis.    Surgical History:  She has no past surgical history on file.    Family History:  Lymphoma.    Social History:  She reports that she has never smoked. She has never used smokeless tobacco. No history on file for alcohol use and drug use.     Allergies:  Prednisone    Current Meds:  Current Outpatient Medications:     clarithromycin (Biaxin) 250 mg tablet, Take by mouth., Disp: , Rfl:     doxepin (SINEquan) 25 mg capsule, Take 1 capsule (25 mg) by mouth., Disp: , Rfl:     FLUoxetine (PROzac) 40 mg capsule, Take 1 capsule (40 mg) by mouth., Disp: , Rfl:     levothyroxine (Synthroid, Levoxyl) 100 mcg tablet, Take 1 tablet (100 mcg) by mouth., Disp: , Rfl:     lisinopril 10 mg tablet, 1 tablet (10 mg)., Disp: , Rfl:     LORazepam (Ativan) 0.5 mg tablet, 1 tablet (0.5 mg)., Disp: , Rfl:     montelukast (Singulair) 10 mg tablet, Take by mouth., Disp: , Rfl:     mupirocin (Bactroban) 2 % ointment, Apply topically., Disp: , Rfl:     norethindrone ac-eth estradioL (Microgestin 1/20) 1-20 mg-mcg tablet, Take by mouth., Disp: , Rfl:     triamcinolone (Kenalog) 0.5 % ointment, Apply topically., Disp: , Rfl:     Xhance 93 mcg/actuation aerosol breath activated, USE 1 SPRAY  "PER NOSTRIL TWICE A DAY, Disp: , Rfl:     Vitals:  Visit Vitals  Ht 1.613 m (5' 3.5\")   Wt 68 kg (150 lb)   BMI 26.15 kg/m²   Smoking Status Never   BSA 1.75 m²   Respirations 14    Physical Exam:  CONSTITUTIONAL:  Vitals reviewed in nursing chart, well developed, well nourished.    RESPIRATION:  Breathing comfortably, no stridor.  CV:  No clubbing/cyanosis/edema in hands.  EYES:  EOM Intact, sclera normal.  NEURO:  Alert and oriented times 3, Cranial nerves 2-12 intact and symmetric bilaterally.  HEAD AND FACE:  Skin with no masses or lesions, sinuses nontender to palpation.  SALIVARY GLANDS:  Parotid and submandibular glands normal bilaterally.  EARS:  Normal external ears, external auditory canals, and TMs to otoscopy, normal hearing to whispered voice.  NOSE:  External nose midline, anterior rhinoscopy is normal with limited visualization to the anterior aspect of the interior turbinates (see nasal endoscopy).  ORAL CAVITY/OROPHARYNX/LIPS:  Normal mucous membranes, normal floor of mouth/tongue/OP, no masses or lesions are noted.  PHARYNGEAL WALLS AND NASOPHARYNX:  No masses noted.  NECK/LYMPH:  No LAD, no thyroid masses.  LARYNX: Could not visualize transorally.    SINONASAL ENDOSCOPY (CPT 09679): To better evaluate the patient's symptoms, sinonasal endoscopy is indicated.  After discussion of risks and benefits, and topical decongestion and anesthesia, an endoscope was used to perform nasal endoscopy on each side.  A time out identifying the patient, the procedure, the location of the procedure and any concerns was performed prior to beginning the procedure.    Findings:  Examination of the right nasal cavity revealed no evidence of purulence or polyposis at the middle meatus or sphenoethmoid recess.  Examination of the left nasal cavity revealed no evidence of purulence or polyposis at the middle meatus or sphenoethmoid recess.  She has narrow external nasal valve bilaterally.  Nasopharynx was normal.  She has " a septal deviation to the left.    Results/Data:  I reviewed the CT sinus dated October 21, 2024 with the patient today in clinic. It demonstrated some inflammatory changes within the right maxillary sinus.  The official report is listed below:    FINDINGS:  Sinocranial & sinoorbital junctions: The lamina papyracea, cribriform plates and fovea ethmoidalis are intact.    Nasal septum and nasal cavity: Little or no septal deviation. Prominent right-sided eva bullosa without airway obstruction.    Frontal sinuses, drainage pathways, and associated anatomic variants:  The frontal sinuses are well developed. The frontal sinuses and frontal sinus outflow tracts are clear.    Ethmoid sinuses: The ethmoid air cells are free of mucosal disease.    Sphenoid sinuses, drainage pathways, and associated variants: The sphenoid sinuses are clear. The sphenoethmoidal recesses are free of abnormal soft tissue bilaterally. The carotid canals are covered by bone.    Maxillary sinuses, drainage pathways, and associated variants:  Minimal mucosal thickening observed in the maxillary sinuses. Prominent right middle eva bullosa noted. The ostiomeatal units appear patent.      Other:  Partially visualized intracranial structures: Normal  Orbits: Normal  Mastoid air cells: Normal  Mandible/maxilla: Mild/moderate bony productive change right temporomandibular joint. Question possible right maxillary molar root dehiscence into the maxillary sinus.    IMPRESSIONS:  * Anatomic variation right nasal fossa without postobstructive changes  * Minimal mucosal thickening as described  * Mild/moderate degenerative change right temporomandibular joint.    I reviewed allergy notes from Dr. Gaspar.  She has been prescribed Ryaltris, Qnasl, Nasonex, Xhance, ipratropium, triple spray, budesonide rinses, Flonase, azelastine.    Provider Impressions:  1.  Chronic sinusitis  2.  Nasal airway obstruction; deviated septum to the left; narrow external  valves  3.  Allergic rhinitis with history of immunotherapy  4.  Headaches, migraines  5.  Autism    Discussion:  Faith Robert and I discussed her exam and symptoms.  She has used significant intranasal medical therapy with persistence of symptoms.  I question whether or not the shape of her nose could be contributing to her congestion symptoms so I recommended a trial of nasal cones.  We discussed how to obtain these online and the rationale for utilization.  She could also consider Breathe Right strips.  If she gets benefit with either of these products I recommended that she be assessed by one of my facial plastic surgery partners for consideration of nasal airway surgery if she wants to consider surgical options.  These providers include Kobe Romero, Frank[facility], and [facility].    At this point in time, I would not recommend sinus surgery in favor of the above option for her nasal breathing.    In regard to her headaches and migraines, she questions whether or not these are related to stress and have been a bigger issue recently involving a move.  I think holding on neurology assessment currently to see if things improve when her stress level improves would make a lot of sense but if her symptoms persist I think she should consider a headache neurology evaluation and happy to place this order at any time.    I am happy to see her back at any time to discuss further options.  All questions were answered.    Patient Discussion/Summary:  Welcome to Dr. Ignacio Armando's clinic.  He is an ENT that specializes in nose, sinus, and skull base disorders.    Dr. Ignacio Armando's office number is [phone].  Please call this number to contact his care team regardless of which office you use to access care.  This number is the most direct way to communicate with all the members of the care team.    His care team includes:    [place]:  Barbara Cheek  Available to receive calls Monday through Friday from 8:00  am until 4:25 pm  She can help you with scheduling of appointments and any general, non-medical questions about the practice    Primary nurse:  Francesca Arevalo, RN, BSN  Available to receive calls Tuesday through Friday from 8:00 am until 4:25 pm  Francesca is also Dr. Armando's surgery scheduler and will assist you with planning and scheduling of your surgery during her office hours    Shania Lan, RN, BSN   Available to receive calls Monday through Thursday from 8:00 am until 4:25 pm    Shania Toledo CNP (sees patients in [place], [facility], and [facility])    Carlos A Spencer MD (sees patients in [place] and [facility])  Bobby Roper MD (sees patients in [facility], and [place])  Dr. Armando's partners in the division of Rhinology    For your convenience, Dr. Armando sees patients at [facility] and [facility].  While we try to make your appointments as convenient as possible, occasionally a visit to another location may be necessary to provide the best care for you.    We look forward to working with you to meet your healthcare goals.    Scribe Attestation  By signing my name below, I, Sofie Napoles, attest that this documentation has been prepared under the direction and in the presence of Ignacio Armando MD.    Signature:  Ignacio Armando MD

## 2024-11-12 ENCOUNTER — APPOINTMENT (OUTPATIENT)
Dept: OTOLARYNGOLOGY | Facility: CLINIC | Age: 54
End: 2024-11-12
Payer: COMMERCIAL

## 2024-11-12 VITALS — HEIGHT: 64 IN | WEIGHT: 150 LBS | BODY MASS INDEX: 25.61 KG/M2

## 2024-11-12 DIAGNOSIS — J31.0 CHRONIC RHINITIS: ICD-10-CM

## 2024-11-12 DIAGNOSIS — R09.81 NASAL CONGESTION: ICD-10-CM

## 2024-11-12 DIAGNOSIS — J32.0 CHRONIC MAXILLARY SINUSITIS: Primary | ICD-10-CM

## 2024-11-12 DIAGNOSIS — J34.2 DEVIATED NASAL SEPTUM: ICD-10-CM

## 2024-11-12 PROCEDURE — 99204 OFFICE O/P NEW MOD 45 MIN: CPT | Performed by: OTOLARYNGOLOGY

## 2024-11-12 PROCEDURE — 31231 NASAL ENDOSCOPY DX: CPT | Performed by: OTOLARYNGOLOGY

## 2024-11-12 RX ORDER — FLUTICASONE PROPIONATE 93 UG/1
SPRAY, METERED NASAL
COMMUNITY
Start: 2024-10-16

## 2024-11-12 RX ORDER — MUPIROCIN 20 MG/G
OINTMENT TOPICAL
COMMUNITY
Start: 2024-09-09

## 2024-11-12 RX ORDER — MONTELUKAST SODIUM 10 MG/1
TABLET ORAL
COMMUNITY

## 2024-11-12 RX ORDER — LISINOPRIL 10 MG/1
10 TABLET ORAL
COMMUNITY
Start: 2024-10-19

## 2024-11-12 RX ORDER — DOXEPIN HYDROCHLORIDE 25 MG/1
25 CAPSULE ORAL
COMMUNITY
Start: 2024-11-08

## 2024-11-12 RX ORDER — FLUOXETINE HYDROCHLORIDE 40 MG/1
40 CAPSULE ORAL
COMMUNITY
Start: 2024-11-08

## 2024-11-12 RX ORDER — NORETHINDRONE ACETATE AND ETHINYL ESTRADIOL .02; 1 MG/1; MG/1
TABLET ORAL
COMMUNITY
Start: 2024-11-05

## 2024-11-12 RX ORDER — LORAZEPAM 0.5 MG/1
0.5 TABLET ORAL
COMMUNITY

## 2024-11-12 RX ORDER — TRIAMCINOLONE ACETONIDE 5 MG/G
OINTMENT TOPICAL
COMMUNITY
Start: 2024-11-05

## 2024-11-12 RX ORDER — CLARITHROMYCIN 250 MG/1
TABLET, FILM COATED ORAL
COMMUNITY
Start: 2024-09-03

## 2024-11-12 RX ORDER — LEVOTHYROXINE SODIUM 100 UG/1
100 TABLET ORAL
COMMUNITY

## 2024-11-12 ASSESSMENT — PATIENT HEALTH QUESTIONNAIRE - PHQ9
SUM OF ALL RESPONSES TO PHQ9 QUESTIONS 1 AND 2: 0
2. FEELING DOWN, DEPRESSED OR HOPELESS: NOT AT ALL
1. LITTLE INTEREST OR PLEASURE IN DOING THINGS: NOT AT ALL

## 2024-11-12 NOTE — LETTER
November 18, 2024     Pravin Gaspar DO  [address] [facility]  [address] [address]    Patient: Faith Robert   YOB: 1970   Date of Visit: 11/12/2024       Dear Dr. Pravin Gaspar DO:    Thank you for referring Faith Robert to me for evaluation. Below are my notes for this consultation.  If you have questions, please do not hesitate to call me. I look forward to following your patient along with you.       Sincerely,     Ignacio Armando MD      CC: No Recipients  ______________________________________________________________________________________                 Sinus & Skull Base Surgery    Chief Complaint:  Nasal airway obstruction/headaches    History Of Present Illness:  Reason For Visit:     Faith Rboert was referred to me by Dr. Gaspar for sinus problems and sinus headaches.      In January 2024 she felt tired and thought she had an upper respiratory tract infection and presented to an urgent care.  She was not given antibiotic therapy at that time but encouraged to do supportive things such as drink lots of fluids.  Later, she saw Dr. Gaspar and she has been given a number of medical therapies through him for nasal congestion.  Because of persistence of symptoms, she underwent a noncontrast CT sinus in October that we reviewed together today.    She has a history of autism.  She has a history of allergies and underwent immunotherapy for more than a decade ending in 2021.  Repeat testing at that time was negative.    She has been given extensive previous intranasal medical therapy but she does not recall all the names of the medical therapy she was prescribed.    She also mentioned a recent elevation in frequency of headaches and migraines.  She questions whether or not this is related to stress surrounding a recent move she has had.    Main Symptoms:  Patient does not have anterior nasal drainage.     Patient does not have posterior nasal  drainage.    Patient has nasal airway obstruction.  Bilaterally.    Patient does not have facial pain.    Patient does not have facial pressure.    Patient does not have decreased sense of smell.   Associated Symptoms:   Patient has headaches.  In the temples; she has not been evaluated by neurology.  Patient does not have throat clearing.    Patient does not have coughing.    Patient does not have dysphonia.   Patient does not have nasal bleeding.    Medications currently on for sinonasal symptoms:  Xhance 1 puff each side BID  Saline rinses BID    Medications tried in the past for sinonasal symptoms:  Biaxin    Other Pertinent Medical Conditions:   Patient does not have asthma.    Patient does not have aspirin sensitivity.    Patient has migraines.  Has had some with stress.  Patient has history of allergy testing. When: Sept 2021 (-)  Patient has history of IT.  [id number].  Patient does not have history of sinus surgery.    Patient does not have history of nasal fracture.    Patient does not have heartburn.    The patient does not take medical therapy for heartburn.   The patient has imaging of sinuses. When: October 2024.    The remainder of a full 10 systems review of systems was pan negative outside of that stated in the history of present illness.    Active Problems:  Nasal congestion.    Past Medical History:  Autism.  Allergic rhinitis.    Surgical History:  She has no past surgical history on file.    Family History:  Lymphoma.    Social History:  She reports that she has never smoked. She has never used smokeless tobacco. No history on file for alcohol use and drug use.     Allergies:  Prednisone    Current Meds:  Current Outpatient Medications:   •  clarithromycin (Biaxin) 250 mg tablet, Take by mouth., Disp: , Rfl:   •  doxepin (SINEquan) 25 mg capsule, Take 1 capsule (25 mg) by mouth., Disp: , Rfl:   •  FLUoxetine (PROzac) 40 mg capsule, Take 1 capsule (40 mg) by mouth., Disp: , Rfl:   •  levothyroxine  "(Synthroid, Levoxyl) 100 mcg tablet, Take 1 tablet (100 mcg) by mouth., Disp: , Rfl:   •  lisinopril 10 mg tablet, 1 tablet (10 mg)., Disp: , Rfl:   •  LORazepam (Ativan) 0.5 mg tablet, 1 tablet (0.5 mg)., Disp: , Rfl:   •  montelukast (Singulair) 10 mg tablet, Take by mouth., Disp: , Rfl:   •  mupirocin (Bactroban) 2 % ointment, Apply topically., Disp: , Rfl:   •  norethindrone ac-eth estradioL (Microgestin 1/20) 1-20 mg-mcg tablet, Take by mouth., Disp: , Rfl:   •  triamcinolone (Kenalog) 0.5 % ointment, Apply topically., Disp: , Rfl:   •  Xhance 93 mcg/actuation aerosol breath activated, USE 1 SPRAY PER NOSTRIL TWICE A DAY, Disp: , Rfl:     Vitals:  Visit Vitals  Ht 1.613 m (5' 3.5\")   Wt 68 kg (150 lb)   BMI 26.15 kg/m²   Smoking Status Never   BSA 1.75 m²   Respirations 14    Physical Exam:  CONSTITUTIONAL:  Vitals reviewed in nursing chart, well developed, well nourished.    RESPIRATION:  Breathing comfortably, no stridor.  CV:  No clubbing/cyanosis/edema in hands.  EYES:  EOM Intact, sclera normal.  NEURO:  Alert and oriented times 3, Cranial nerves 2-12 intact and symmetric bilaterally.  HEAD AND FACE:  Skin with no masses or lesions, sinuses nontender to palpation.  SALIVARY GLANDS:  Parotid and submandibular glands normal bilaterally.  EARS:  Normal external ears, external auditory canals, and TMs to otoscopy, normal hearing to whispered voice.  NOSE:  External nose midline, anterior rhinoscopy is normal with limited visualization to the anterior aspect of the interior turbinates (see nasal endoscopy).  ORAL CAVITY/OROPHARYNX/LIPS:  Normal mucous membranes, normal floor of mouth/tongue/OP, no masses or lesions are noted.  PHARYNGEAL WALLS AND NASOPHARYNX:  No masses noted.  NECK/LYMPH:  No LAD, no thyroid masses.  LARYNX: Could not visualize transorally.    SINONASAL ENDOSCOPY (CPT 18229): To better evaluate the patient's symptoms, sinonasal endoscopy is indicated.  After discussion of risks and benefits, " and topical decongestion and anesthesia, an endoscope was used to perform nasal endoscopy on each side.  A time out identifying the patient, the procedure, the location of the procedure and any concerns was performed prior to beginning the procedure.    Findings:  Examination of the right nasal cavity revealed no evidence of purulence or polyposis at the middle meatus or sphenoethmoid recess.  Examination of the left nasal cavity revealed no evidence of purulence or polyposis at the middle meatus or sphenoethmoid recess.  She has narrow external nasal valve bilaterally.  Nasopharynx was normal.  She has a septal deviation to the left.    Results/Data:  I reviewed the CT sinus dated October 21, 2024 with the patient today in clinic. It demonstrated some inflammatory changes within the right maxillary sinus.  The official report is listed below:    FINDINGS:  Sinocranial & sinoorbital junctions: The lamina papyracea, cribriform plates and fovea ethmoidalis are intact.    Nasal septum and nasal cavity: Little or no septal deviation. Prominent right-sided eva bullosa without airway obstruction.    Frontal sinuses, drainage pathways, and associated anatomic variants:  The frontal sinuses are well developed. The frontal sinuses and frontal sinus outflow tracts are clear.    Ethmoid sinuses: The ethmoid air cells are free of mucosal disease.    Sphenoid sinuses, drainage pathways, and associated variants: The sphenoid sinuses are clear. The sphenoethmoidal recesses are free of abnormal soft tissue bilaterally. The carotid canals are covered by bone.    Maxillary sinuses, drainage pathways, and associated variants:  Minimal mucosal thickening observed in the maxillary sinuses. Prominent right middle eva bullosa noted. The ostiomeatal units appear patent.      Other:  Partially visualized intracranial structures: Normal  Orbits: Normal  Mastoid air cells: Normal  Mandible/maxilla: Mild/moderate bony productive change  right temporomandibular joint. Question possible right maxillary molar root dehiscence into the maxillary sinus.    IMPRESSIONS:  * Anatomic variation right nasal fossa without postobstructive changes  * Minimal mucosal thickening as described  * Mild/moderate degenerative change right temporomandibular joint.    I reviewed allergy notes from Dr. Gaspar.  She has been prescribed Ryaltris, Qnasl, Nasonex, Xhance, ipratropium, triple spray, budesonide rinses, Flonase, azelastine.    Provider Impressions:  1.  Chronic sinusitis  2.  Nasal airway obstruction; deviated septum to the left; narrow external valves  3.  Allergic rhinitis with history of immunotherapy  4.  Headaches, migraines  5.  Autism    Discussion:  Faith Robert and I discussed her exam and symptoms.  She has used significant intranasal medical therapy with persistence of symptoms.  I question whether or not the shape of her nose could be contributing to her congestion symptoms so I recommended a trial of nasal cones.  We discussed how to obtain these online and the rationale for utilization.  She could also consider Breathe Right strips.  If she gets benefit with either of these products I recommended that she be assessed by one of my facial plastic surgery partners for consideration of nasal airway surgery if she wants to consider surgical options.  These providers include Kobe Romero, Frankel, and Fabi.    At this point in time, I would not recommend sinus surgery in favor of the above option for her nasal breathing.    In regard to her headaches and migraines, she questions whether or not these are related to stress and have been a bigger issue recently involving a move.  I think holding on neurology assessment currently to see if things improve when her stress level improves would make a lot of sense but if her symptoms persist I think she should consider a headache neurology evaluation and happy to place this order at any time.    I  am happy to see her back at any time to discuss further options.  All questions were answered.    Patient Discussion/Summary:  Welcome to Dr. Ignacio Armando's clinic.  He is an ENT that specializes in nose, sinus, and skull base disorders.    Dr. Ignacio Armando's office number is [phone].  Please call this number to contact his care team regardless of which office you use to access care.  This number is the most direct way to communicate with all the members of the care team.    His care team includes:    [place]:  Barbara Cheek  Available to receive calls Monday through Friday from 8:00 am until 4:25 pm  She can help you with scheduling of appointments and any general, non-medical questions about the practice    Primary nurse:  Francesca Arevalo RN, BSN  Available to receive calls Tuesday through Friday from 8:00 am until 4:25 pm  Francesca is also Dr. Armando's surgery scheduler and will assist you with planning and scheduling of your surgery during her office hours    Shania Lan RN, BSN   Available to receive calls Monday through Thursday from 8:00 am until 4:25 pm    Shania Toledo CNP (sees patients in [place], [facility], and [facility])    Carlos A Spencer MD (sees patients in [place] and [facility])  Bobby Roper MD (sees patients in [facility], [facility], and [place])  Dr. Armando's partners in the division of Rhinology    For your convenience, Dr. Armando sees patients at [facility] and [facility].  While we try to make your appointments as convenient as possible, occasionally a visit to another location may be necessary to provide the best care for you.    We look forward to working with you to meet your healthcare goals.    Scribe Attestation  By signing my name below, IaSnya Scribe, attest that this documentation has been prepared under the direction and in the presence of Ignacio Armando MD.    Signature:  Ignacio Armando MD

## 2024-11-26 ENCOUNTER — TELEPHONE (OUTPATIENT)
Dept: PRIMARY CARE CLINIC | Age: 54
End: 2024-11-26

## 2024-12-09 DIAGNOSIS — Z00.00 INITIAL MEDICARE ANNUAL WELLNESS VISIT: ICD-10-CM

## 2024-12-09 DIAGNOSIS — E03.9 ACQUIRED HYPOTHYROIDISM: ICD-10-CM

## 2024-12-09 DIAGNOSIS — I10 PRIMARY HYPERTENSION: ICD-10-CM

## 2024-12-09 DIAGNOSIS — R73.01 IMPAIRED FASTING GLUCOSE: ICD-10-CM

## 2024-12-09 DIAGNOSIS — E55.9 VITAMIN D INSUFFICIENCY: ICD-10-CM

## 2024-12-09 LAB
ALBUMIN: 4.2 G/DL (ref 3.5–5.2)
ALP BLD-CCNC: 60 U/L (ref 35–104)
ALT SERPL-CCNC: 20 U/L (ref 0–32)
ANION GAP SERPL CALCULATED.3IONS-SCNC: 16 MMOL/L (ref 7–16)
AST SERPL-CCNC: 26 U/L (ref 0–31)
BASOPHILS ABSOLUTE: 0.03 K/UL (ref 0–0.2)
BASOPHILS RELATIVE PERCENT: 0 % (ref 0–2)
BILIRUB SERPL-MCNC: 0.4 MG/DL (ref 0–1.2)
BILIRUBIN, URINE: NEGATIVE
BUN BLDV-MCNC: 10 MG/DL (ref 6–20)
CALCIUM SERPL-MCNC: 9.5 MG/DL (ref 8.6–10.2)
CHLORIDE BLD-SCNC: 103 MMOL/L (ref 98–107)
CHOLESTEROL, TOTAL: 175 MG/DL
CO2: 19 MMOL/L (ref 22–29)
COLOR, UA: YELLOW
COMMENT: ABNORMAL
CREAT SERPL-MCNC: 0.9 MG/DL (ref 0.5–1)
EOSINOPHILS ABSOLUTE: 0.08 K/UL (ref 0.05–0.5)
EOSINOPHILS RELATIVE PERCENT: 1 % (ref 0–6)
GFR, ESTIMATED: 73 ML/MIN/1.73M2
GLUCOSE BLD-MCNC: 90 MG/DL (ref 74–99)
GLUCOSE URINE: NEGATIVE MG/DL
HBA1C MFR BLD: 5.2 % (ref 4–5.6)
HCT VFR BLD CALC: 46.4 % (ref 34–48)
HDLC SERPL-MCNC: 84 MG/DL
HEMOGLOBIN: 15 G/DL (ref 11.5–15.5)
IMMATURE GRANULOCYTES %: 0 % (ref 0–5)
IMMATURE GRANULOCYTES ABSOLUTE: <0.03 K/UL (ref 0–0.58)
KETONES, URINE: ABNORMAL MG/DL
LDL CHOLESTEROL: 77 MG/DL
LEUKOCYTE ESTERASE, URINE: NEGATIVE
LYMPHOCYTES ABSOLUTE: 1.56 K/UL (ref 1.5–4)
LYMPHOCYTES RELATIVE PERCENT: 23 % (ref 20–42)
MCH RBC QN AUTO: 30.2 PG (ref 26–35)
MCHC RBC AUTO-ENTMCNC: 32.3 G/DL (ref 32–34.5)
MCV RBC AUTO: 93.4 FL (ref 80–99.9)
MONOCYTES ABSOLUTE: 0.39 K/UL (ref 0.1–0.95)
MONOCYTES RELATIVE PERCENT: 6 % (ref 2–12)
NEUTROPHILS ABSOLUTE: 4.72 K/UL (ref 1.8–7.3)
NEUTROPHILS RELATIVE PERCENT: 70 % (ref 43–80)
NITRITE, URINE: NEGATIVE
PDW BLD-RTO: 12.2 % (ref 11.5–15)
PH, URINE: 6 (ref 5–9)
PLATELET # BLD: 277 K/UL (ref 130–450)
PMV BLD AUTO: 10.1 FL (ref 7–12)
POTASSIUM SERPL-SCNC: 4.1 MMOL/L (ref 3.5–5)
PROTEIN UA: NEGATIVE MG/DL
RBC # BLD: 4.97 M/UL (ref 3.5–5.5)
SODIUM BLD-SCNC: 138 MMOL/L (ref 132–146)
SPECIFIC GRAVITY UA: 1.02 (ref 1–1.03)
T4 FREE: 1.2 NG/DL (ref 0.9–1.7)
TOTAL PROTEIN: 7.1 G/DL (ref 6.4–8.3)
TRIGL SERPL-MCNC: 72 MG/DL
TSH SERPL DL<=0.05 MIU/L-ACNC: 3.2 UIU/ML (ref 0.27–4.2)
TURBIDITY: CLEAR
URINE HGB: NEGATIVE
UROBILINOGEN, URINE: 0.2 EU/DL (ref 0–1)
VITAMIN D 25-HYDROXY: 75.4 NG/ML (ref 30–100)
VLDLC SERPL CALC-MCNC: 14 MG/DL
WBC # BLD: 6.8 K/UL (ref 4.5–11.5)

## 2024-12-10 DIAGNOSIS — E03.9 ACQUIRED HYPOTHYROIDISM: ICD-10-CM

## 2024-12-10 RX ORDER — LEVOTHYROXINE SODIUM 100 UG/1
TABLET ORAL
Qty: 90 TABLET | Refills: 3 | Status: SHIPPED
Start: 2024-12-10 | End: 2024-12-12 | Stop reason: SDUPTHER

## 2024-12-12 ENCOUNTER — OFFICE VISIT (OUTPATIENT)
Dept: PRIMARY CARE CLINIC | Age: 54
End: 2024-12-12

## 2024-12-12 VITALS
BODY MASS INDEX: 25.52 KG/M2 | WEIGHT: 144 LBS | HEART RATE: 110 BPM | SYSTOLIC BLOOD PRESSURE: 126 MMHG | TEMPERATURE: 97.8 F | OXYGEN SATURATION: 97 % | DIASTOLIC BLOOD PRESSURE: 84 MMHG | HEIGHT: 63 IN

## 2024-12-12 DIAGNOSIS — F41.1 GAD (GENERALIZED ANXIETY DISORDER): ICD-10-CM

## 2024-12-12 DIAGNOSIS — I10 PRIMARY HYPERTENSION: Primary | ICD-10-CM

## 2024-12-12 DIAGNOSIS — F84.0 AUTISTIC DISORDER: ICD-10-CM

## 2024-12-12 DIAGNOSIS — F60.9 PERSONALITY DISORDER (HCC): ICD-10-CM

## 2024-12-12 DIAGNOSIS — Z91.09 ENVIRONMENTAL ALLERGIES: ICD-10-CM

## 2024-12-12 DIAGNOSIS — R73.01 IMPAIRED FASTING GLUCOSE: ICD-10-CM

## 2024-12-12 DIAGNOSIS — E03.9 ACQUIRED HYPOTHYROIDISM: ICD-10-CM

## 2024-12-12 DIAGNOSIS — Z30.41 ENCOUNTER FOR SURVEILLANCE OF CONTRACEPTIVE PILLS: ICD-10-CM

## 2024-12-12 DIAGNOSIS — E55.9 VITAMIN D INSUFFICIENCY: ICD-10-CM

## 2024-12-12 RX ORDER — MUPIROCIN 20 MG/G
OINTMENT TOPICAL
COMMUNITY
Start: 2024-09-09

## 2024-12-12 RX ORDER — MONTELUKAST SODIUM 10 MG/1
TABLET ORAL
Qty: 90 TABLET | Refills: 3 | Status: SHIPPED | OUTPATIENT
Start: 2024-12-12

## 2024-12-12 RX ORDER — LISINOPRIL 10 MG/1
10 TABLET ORAL DAILY
Qty: 90 TABLET | Refills: 3 | Status: SHIPPED | OUTPATIENT
Start: 2024-12-12

## 2024-12-12 RX ORDER — LEVOTHYROXINE SODIUM 100 UG/1
TABLET ORAL
Qty: 90 TABLET | Refills: 3 | Status: SHIPPED | OUTPATIENT
Start: 2024-12-12

## 2024-12-12 RX ORDER — NORETHINDRONE ACETATE AND ETHINYL ESTRADIOL .02; 1 MG/1; MG/1
1 TABLET ORAL DAILY
Qty: 3 PACKET | Refills: 3 | Status: SHIPPED | OUTPATIENT
Start: 2024-12-12

## 2024-12-12 RX ORDER — FLUTICASONE PROPIONATE 93 UG/1
SPRAY, METERED NASAL
COMMUNITY
Start: 2024-10-16

## 2024-12-12 ASSESSMENT — ENCOUNTER SYMPTOMS
COUGH: 0
SHORTNESS OF BREATH: 0
WHEEZING: 0
NAUSEA: 0
VOMITING: 0
ABDOMINAL PAIN: 0
BACK PAIN: 0
DIARRHEA: 0
CONSTIPATION: 0

## 2024-12-12 NOTE — ASSESSMENT & PLAN NOTE
Chronic, at goal (stable), continue current treatment plan    Orders:    lisinopril (PRINIVIL;ZESTRIL) 10 MG tablet; Take 1 tablet by mouth daily    CBC with Auto Differential; Future    Comprehensive Metabolic Panel; Future    Lipid Panel; Future    TSH; Future    Uric Acid; Future    Urinalysis; Future

## 2024-12-12 NOTE — ASSESSMENT & PLAN NOTE
Chronic, at goal (stable), continue current treatment plan    Orders:    levothyroxine (SYNTHROID) 100 MCG tablet; TAKE 1 TABLET DAILY    TSH; Future    T4, Free; Future

## 2024-12-12 NOTE — PROGRESS NOTES
24  Ama Juarez : 1970 Sex: female  Age: 54 y.o.    Chief Complaint   Patient presents with    Hypertension     HPI:  54 y.o. female presents today for 6 month follow up of chronic medical conditions, medication refills and FBW results.  Patient's chart, medical, surgical and medication history all reviewed.    Hypothyroidism  Patient presents for routine follow up of Hypothyroidism. Current symptoms: none. Patient denies change in energy level, diarrhea, heat / cold intolerance, nervousness, palpitations and weight changes. Symptoms have been well-controlled. No difficulty swallowing or masses felt.   Lab Results   Component Value Date    TSH 3.20 2024   Patient is currently taking Levothyroxine 100 mcg.    She has history of CVID.  Takes Biaxin daily.  Follows with Dr. Lund for management.     Hypertension   The patient presents today for follow up of HTN.  The problem is well controlled, improved. Risk factors for coronary artery disease include Age > 30 and HTN. Current treatments include Lisinopril.  Lifestyle changes the patient has made include  none .  Today the patient is complaining of none.        Mood disorder  Patient has a long history of anxiety, depression and mood disorder.  On Autism spectrum.  In 2023, she was seen in the ER because she was having suicidal thoughts with intent to drive to [place] and jump off a bridge.  Admitted to the Psych unit.  Prozac was discontinued.  Trialed on Risperdone and Trileptal but ended up having a syncopal episode.  Both medications were stopped.  She was again seen in the ER in 2023 for suicidal ideation and had medications changed.  Most recently on Prozac and Rexulti, but had to stop Rexulti due to side effects.  Then trialed on Lamictal but had severe worsening of anxiety.  Now on Doxepin at night and having improvement in symptoms.     ROS:  Review of Systems   Constitutional:  Negative for appetite change, chills,

## 2025-01-15 DIAGNOSIS — G89.29 CHRONIC LEFT SI JOINT PAIN: Primary | ICD-10-CM

## 2025-01-15 DIAGNOSIS — M53.3 CHRONIC LEFT SI JOINT PAIN: Primary | ICD-10-CM

## 2025-01-28 ENCOUNTER — APPOINTMENT (OUTPATIENT)
Dept: OTOLARYNGOLOGY | Facility: CLINIC | Age: 55
End: 2025-01-28
Payer: COMMERCIAL

## 2025-01-28 VITALS — BODY MASS INDEX: 24.41 KG/M2 | WEIGHT: 143 LBS | HEIGHT: 64 IN

## 2025-01-28 DIAGNOSIS — R09.81 NASAL CONGESTION: ICD-10-CM

## 2025-01-28 DIAGNOSIS — J31.0 CHRONIC RHINITIS: Primary | ICD-10-CM

## 2025-01-28 DIAGNOSIS — J32.0 CHRONIC MAXILLARY SINUSITIS: ICD-10-CM

## 2025-01-28 DIAGNOSIS — J34.2 DEVIATED NASAL SEPTUM: ICD-10-CM

## 2025-01-28 PROCEDURE — 99214 OFFICE O/P EST MOD 30 MIN: CPT | Performed by: OTOLARYNGOLOGY

## 2025-01-28 PROCEDURE — 1036F TOBACCO NON-USER: CPT | Performed by: OTOLARYNGOLOGY

## 2025-01-28 PROCEDURE — 31231 NASAL ENDOSCOPY DX: CPT | Performed by: OTOLARYNGOLOGY

## 2025-01-28 PROCEDURE — 3008F BODY MASS INDEX DOCD: CPT | Performed by: OTOLARYNGOLOGY

## 2025-01-28 ASSESSMENT — PATIENT HEALTH QUESTIONNAIRE - PHQ9
1. LITTLE INTEREST OR PLEASURE IN DOING THINGS: NOT AT ALL
2. FEELING DOWN, DEPRESSED OR HOPELESS: NOT AT ALL
SUM OF ALL RESPONSES TO PHQ9 QUESTIONS 1 AND 2: 0

## 2025-01-28 NOTE — PROGRESS NOTES
Sinus & Skull Base Surgery    Chief Complaint:  1.  Chronic sinusitis  2.  Nasal airway obstruction; deviated septum to the left; narrow external valves  3.  Allergic rhinitis with history of immunotherapy  4.  Headaches, migraines  5.  Autism    History Of Present Illness:    Faith Robert presents since last being seen 11/12/2024.     She obtained nasal cones but did not notice a significant change in her nasal breathing.    Main Symptoms:  Patient has anterior nasal drainage.  Thick, can be discolored; can be bloody   Patient has posterior nasal drainage.  Sometimes in the AM  Patient has nasal airway obstruction.  Bilateral   Patient has facial pain.  < 50 % of the time  Patient has facial pressure.  < 50 % of the time  Patient does not have decreased sense of smell.   Associated Symptoms:  Patient has headaches.  Sinus; not daily.   Patient has throat clearing.  Rarely  Patient has coughing.  Rarely  Patient does not have dysphonia.  Patient has nasal bleeding.  Not free flowing bleeds     Medications currently on for sinonasal symptoms:  Saline rinse BID  Gargle with warm saline BID  Xhance 1 spray BID   Saline spray     Active Problems:  There is no problem list on file for this patient.    Past Medical History:  She has no past medical history on file.    Surgical History:  She has no past surgical history on file.    Family History:  No family history on file.    Social History:  She reports that she has never smoked. She has never been exposed to tobacco smoke. She has never used smokeless tobacco. No history on file for alcohol use and drug use.    Allergies:  Prednisone    Current Meds:  Current Outpatient Medications:     clarithromycin (Biaxin) 250 mg tablet, Take by mouth., Disp: , Rfl:     doxepin (SINEquan) 25 mg capsule, Take 1 capsule (25 mg) by mouth., Disp: , Rfl:     FLUoxetine (PROzac) 40 mg capsule, Take 1 capsule (40 mg) by mouth., Disp: , Rfl:     levothyroxine (Synthroid,  "Levoxyl) 100 mcg tablet, Take 1 tablet (100 mcg) by mouth., Disp: , Rfl:     lisinopril 10 mg tablet, 1 tablet (10 mg)., Disp: , Rfl:     LORazepam (Ativan) 0.5 mg tablet, 1 tablet (0.5 mg)., Disp: , Rfl:     montelukast (Singulair) 10 mg tablet, Take by mouth., Disp: , Rfl:     mupirocin (Bactroban) 2 % ointment, Apply topically., Disp: , Rfl:     norethindrone ac-eth estradioL (Microgestin 1/20) 1-20 mg-mcg tablet, Take by mouth., Disp: , Rfl:     triamcinolone (Kenalog) 0.5 % ointment, Apply topically., Disp: , Rfl:     Xhance 93 mcg/actuation aerosol breath activated, USE 1 SPRAY PER NOSTRIL TWICE A DAY, Disp: , Rfl:     Vitals:  Visit Vitals  Ht 1.613 m (5' 3.5\")   Wt 64.9 kg (143 lb)   BMI 24.93 kg/m²   Smoking Status Never   BSA 1.71 m²     Physical Exam:  Nose: On external exam there are neither lesions nor asymmetry of the nasal tip/dorsum. On anterior rhinoscopy, visualization posteriorly is limited on anterior examination. For this reason, to adequately evaluate posteriorly for masses, source of epistaxis, polypoid disease, debridement, and/or signs of infections, nasal endoscopy is indicated.  (Please see procedure below.)    SINONASAL ENDOSCOPY (CPT 04242): To better evaluate the patient's symptoms, sinonasal endoscopy is indicated.  After discussion of risks and benefits, and topical decongestion and anesthesia, an endoscope was used to perform nasal endoscopy on each side.  A time out identifying the patient, the procedure, the location of the procedure and any concerns was performed prior to beginning the procedure.    Findings:  Examination of the right nasal cavity revealed anterior nasal cavity dryness with some fresh blood present.  Middle meatus and sphenoethmoid recess were normal without pus or polyps.  Examination of the left nasal cavity revealed some dried mucus anteriorly.  Middle meatus and sphenoethmoid recess were normal.  She has a deviated septum to the left.  She has narrow through " her nasal valves.    Results/Data:  The patient and I again discussed her CT sinus from October 22, 2024.  I personally reviewed this study.  There was some inflammatory changes in the right maxillary sinus.    Provider Impressions:  1.  Chronic sinusitis  2.  Nasal airway obstruction; deviated septum to the left; narrow external valves  3.  Allergic rhinitis with history of immunotherapy  4.  Facial pain and pressure, headaches, migraines  5.  Autism  6.  Rhinorrhea  7.  Throat clearing, coughing    Discussion:  Faith Robert and I discussed options both medical and surgical.  In regard to intranasal options, she has used extensive topical therapy and unfortunately she has persistence of symptoms.  I think some of her nasal dryness is likely secondary to Xhance so she could certainly use this less frequently or hold it for a period of time but I think she needs to titrate usage with benefit and also with her nasal dryness.    She could consider a nebulized or a rinse based topical.  This would be outside of her insurance so we would need to be financially thoughtful.    We have previously discussed the narrowness through her valves and the small size of her nose as a possible contributor to her ongoing symptoms.  She obtained nasal cones but has not had significant benefit with them but I remain suspicious that the size of her valves is contributing to her congestion.  I suggested an evaluation with either Dr. Jac Romero or Dr. Jason Dunlap within her facial plastic surgery division for consultation to discuss whether or not nasal airway surgery with a valve component would be indicated.  If they deem her a candidate, I do think opening the right maxillary sinus would be of clinical benefit.    I would like to see her back about 6 months after that procedure if her symptoms persist.  If she ultimately chooses not to undergo surgical intervention and happy to see her back to discuss rinse based or  nebulized topical options.  All questions were answered.    Scribe Attestation  By signing my name below, I, Sofie Napoles, attest that this documentation has been prepared under the direction and in the presence of Ignacio Armando MD.    Signature:  Ignacio Armando MD

## 2025-02-03 ENCOUNTER — PREP FOR PROCEDURE (OUTPATIENT)
Dept: PAIN MANAGEMENT | Age: 55
End: 2025-02-03

## 2025-02-03 ENCOUNTER — OFFICE VISIT (OUTPATIENT)
Dept: PAIN MANAGEMENT | Age: 55
End: 2025-02-03
Payer: COMMERCIAL

## 2025-02-03 VITALS
HEART RATE: 99 BPM | BODY MASS INDEX: 24.8 KG/M2 | HEIGHT: 63 IN | RESPIRATION RATE: 20 BRPM | WEIGHT: 140 LBS | OXYGEN SATURATION: 97 % | SYSTOLIC BLOOD PRESSURE: 142 MMHG | DIASTOLIC BLOOD PRESSURE: 88 MMHG | TEMPERATURE: 98.2 F

## 2025-02-03 DIAGNOSIS — M53.3 SACROILIAC DYSFUNCTION: Primary | ICD-10-CM

## 2025-02-03 DIAGNOSIS — D83.9 CVID (COMMON VARIABLE IMMUNODEFICIENCY) (HCC): ICD-10-CM

## 2025-02-03 DIAGNOSIS — M47.817 LUMBOSACRAL SPONDYLOSIS WITHOUT MYELOPATHY: ICD-10-CM

## 2025-02-03 DIAGNOSIS — M51.369 DEGENERATION OF INTERVERTEBRAL DISC OF LUMBAR REGION, UNSPECIFIED WHETHER PAIN PRESENT: ICD-10-CM

## 2025-02-03 PROCEDURE — G8420 CALC BMI NORM PARAMETERS: HCPCS | Performed by: ANESTHESIOLOGY

## 2025-02-03 PROCEDURE — 3017F COLORECTAL CA SCREEN DOC REV: CPT | Performed by: ANESTHESIOLOGY

## 2025-02-03 PROCEDURE — 3079F DIAST BP 80-89 MM HG: CPT | Performed by: ANESTHESIOLOGY

## 2025-02-03 PROCEDURE — 3077F SYST BP >= 140 MM HG: CPT | Performed by: ANESTHESIOLOGY

## 2025-02-03 PROCEDURE — 99203 OFFICE O/P NEW LOW 30 MIN: CPT | Performed by: ANESTHESIOLOGY

## 2025-02-03 PROCEDURE — 1036F TOBACCO NON-USER: CPT | Performed by: ANESTHESIOLOGY

## 2025-02-03 PROCEDURE — G8427 DOCREV CUR MEDS BY ELIG CLIN: HCPCS | Performed by: ANESTHESIOLOGY

## 2025-02-03 RX ORDER — ZINC GLUCONATE 50 MG
50 TABLET ORAL DAILY
COMMUNITY

## 2025-02-03 RX ORDER — SODIUM CHLORIDE 0.9 % (FLUSH) 0.9 %
5-40 SYRINGE (ML) INJECTION PRN
Status: CANCELLED | OUTPATIENT
Start: 2025-02-03

## 2025-02-03 RX ORDER — SODIUM CHLORIDE 0.9 % (FLUSH) 0.9 %
5-40 SYRINGE (ML) INJECTION EVERY 12 HOURS SCHEDULED
Status: CANCELLED | OUTPATIENT
Start: 2025-02-03

## 2025-02-03 RX ORDER — SODIUM CHLORIDE 9 MG/ML
INJECTION, SOLUTION INTRAVENOUS PRN
Status: CANCELLED | OUTPATIENT
Start: 2025-02-03

## 2025-02-03 RX ORDER — VIT C/B6/B5/MAGNESIUM/HERB 173 50-5-6-5MG
CAPSULE ORAL DAILY
COMMUNITY

## 2025-02-03 RX ORDER — GINGER ROOT 550 MG
CAPSULE ORAL
COMMUNITY

## 2025-02-03 NOTE — PROGRESS NOTES
Patient:  Ama Juarez,  1970  Date of Service:  2/3/25      Patient presents to [facility] with complaints of left buttock pain that started 6 years ago and has been getting worse.     She states the pain began following No specific cause    Pain is constant and is described as aching. She rates the pain as a 6/10 on her worst day , 1/10 on her best day, and a 3/10 on average on the VAS scale.     Pain does not radiate to left leg. She  does not have numbness, tingling, weakness of the left leg.    Alleviating factors include: heat.  Aggravating factors include:  not doing exercises. She states that the pain does not keep her from sleeping at night.     She is not on NSAIDS and  is  on anticoagulation medications to include fish oil and is managed by Petra Olmedo DO  .     Previous treatments: Physical Therapy and Nerve block.      Personal Expectations from this treatment: increase activity and decrease pain    Do you want someone present when the provider examines you? Yes    Ama's blood pressure was elevated today. She was instructed to contact his primary care provider as soon as possible.    BP (!) 142/88   Pulse 99   Temp 98.2 °F (36.8 °C) (Infrared)   Resp 20   Ht 1.6 m (5' 3\")   Wt 63.5 kg (140 lb)   SpO2 97%   BMI 24.80 kg/m²     No LMP recorded. (Menstrual status: Other - See Notes).    
2/3/2025    I was present for the physical exam and/or procedure of Ama Juarez by Von John M.D.  
lower  Edema:no    Neurological:    Sensory: Normal to light touch     Motor:   Right  5/5              Left  5/5               Right Bicep 5/5           Left Bicep 5/5              Right Triceps 5/5       Left Triceps 5/5          Right Deltoid 5/5     Left Deltoid 5/5                  Right Quadriceps 5/5          Left Quadriceps 5/5           Right Gastrocnemius 5/5    Left Gastrocnemius 5/5  Right Ant Tibialis 5/5  Left Ant Tibialis 5/5    Dermatology:    Skin:no rashes or lesions noted    EXAMINED IN THE PRESENCE OF MS. GARRY    Assessment/Plan:     Diagnosis Orders   1. Sacroiliac dysfunction        2. Lumbosacral spondylosis without myelopathy        3. Degeneration of intervertebral disc of lumbar region, unspecified whether pain present        4. CVID (common variable immunodeficiency) (HCC)          54 y.o. female with H/o chronic low back pain.    Features of left SIJ pain. Failed conservative treatment for > 6 weeks.    Prior Xray LS spine reviewed.  Prior SIJ injection > 80% relief for > 3 months-By Dr. Forbes.  Has noted recurrence of similar pain.  Pain Mainly on the left side over the SIJ.  H/o CVID.    PLAN:  Xray SIJ  Xray LS spine    Left SIJ injection under fluoroscopic guidance. I have discussed the risk and benefits of this surgery/procedure and the alternatives with the patient. All questions answered to their satisfaction.    Although prednisone allergy, patient has received Kenalog with no issues- will use Kenalog.    If short term relief consider RFA.    PT/ HEP.    Counseling : Patient encouraged to stay active and to watch/lose weight    Encouraged to continue Regular home exercise program as tolerated - stretching / strengthening.    Treatment plan discussed with the patient including medication and procedure side effects.    Controlled Substances Monitoring: OARRS reviewed.    Von John MD    CC:    Guille Forbes MD  [address]  [address]  [place], [place]

## 2025-02-03 NOTE — H&P (VIEW-ONLY)
lower  Edema:no    Neurological:    Sensory: Normal to light touch     Motor:   Right  5/5              Left  5/5               Right Bicep 5/5           Left Bicep 5/5              Right Triceps 5/5       Left Triceps 5/5          Right Deltoid 5/5     Left Deltoid 5/5                  Right Quadriceps 5/5          Left Quadriceps 5/5           Right Gastrocnemius 5/5    Left Gastrocnemius 5/5  Right Ant Tibialis 5/5  Left Ant Tibialis 5/5    Dermatology:    Skin:no rashes or lesions noted    EXAMINED IN THE PRESENCE OF MS. GARRY    Assessment/Plan:     Diagnosis Orders   1. Sacroiliac dysfunction        2. Lumbosacral spondylosis without myelopathy        3. Degeneration of intervertebral disc of lumbar region, unspecified whether pain present        4. CVID (common variable immunodeficiency) (HCC)          54 y.o. female with H/o chronic low back pain.    Features of left SIJ pain. Failed conservative treatment for > 6 weeks.    Prior Xray LS spine reviewed.  Prior SIJ injection > 80% relief for > 3 months-By Dr. Forbes.  Has noted recurrence of similar pain.  Pain Mainly on the left side over the SIJ.  H/o CVID.    PLAN:  Xray SIJ  Xray LS spine    Left SIJ injection under fluoroscopic guidance. I have discussed the risk and benefits of this surgery/procedure and the alternatives with the patient. All questions answered to their satisfaction.    Although prednisone allergy, patient has received Kenalog with no issues- will use Kenalog.    If short term relief consider RFA.    PT/ HEP.    Counseling : Patient encouraged to stay active and to watch/lose weight    Encouraged to continue Regular home exercise program as tolerated - stretching / strengthening.    Treatment plan discussed with the patient including medication and procedure side effects.    Controlled Substances Monitoring: OARRS reviewed.    Von John MD    CC:    Guille Forbes MD  [address]  [address]  [place], [place]

## 2025-02-05 ENCOUNTER — OFFICE VISIT (OUTPATIENT)
Dept: FAMILY MEDICINE CLINIC | Age: 55
End: 2025-02-05

## 2025-02-05 ENCOUNTER — TELEPHONE (OUTPATIENT)
Dept: PAIN MANAGEMENT | Age: 55
End: 2025-02-05

## 2025-02-05 VITALS
OXYGEN SATURATION: 98 % | HEIGHT: 63 IN | TEMPERATURE: 98.6 F | HEART RATE: 116 BPM | RESPIRATION RATE: 20 BRPM | SYSTOLIC BLOOD PRESSURE: 136 MMHG | WEIGHT: 146 LBS | DIASTOLIC BLOOD PRESSURE: 82 MMHG | BODY MASS INDEX: 25.87 KG/M2

## 2025-02-05 DIAGNOSIS — M54.50 ACUTE BILATERAL LOW BACK PAIN WITHOUT SCIATICA: Primary | ICD-10-CM

## 2025-02-05 RX ORDER — TRIAMCINOLONE ACETONIDE 40 MG/ML
40 INJECTION, SUSPENSION INTRA-ARTICULAR; INTRAMUSCULAR ONCE
Status: COMPLETED | OUTPATIENT
Start: 2025-02-05 | End: 2025-02-05

## 2025-02-05 RX ADMIN — TRIAMCINOLONE ACETONIDE 40 MG: 40 INJECTION, SUSPENSION INTRA-ARTICULAR; INTRAMUSCULAR at 11:58

## 2025-02-05 ASSESSMENT — ENCOUNTER SYMPTOMS
EYE PAIN: 0
ABDOMINAL PAIN: 0
SHORTNESS OF BREATH: 0
CHEST TIGHTNESS: 0
VOMITING: 0
TROUBLE SWALLOWING: 0
EYE DISCHARGE: 0
DIARRHEA: 0
EYE REDNESS: 0
NAUSEA: 0
SINUS PAIN: 0
PHOTOPHOBIA: 0
SORE THROAT: 0
ALLERGIC/IMMUNOLOGIC NEGATIVE: 1
BLOOD IN STOOL: 0
COUGH: 0
BACK PAIN: 1

## 2025-02-05 NOTE — PROGRESS NOTES
palpitations and leg swelling.   Gastrointestinal:  Negative for abdominal pain, blood in stool, diarrhea, nausea and vomiting.   Endocrine: Negative.    Genitourinary:  Negative for dysuria, flank pain, frequency and hematuria.   Musculoskeletal:  Positive for arthralgias, back pain and myalgias. Negative for joint swelling.        Lumbar   Skin: Negative.    Allergic/Immunologic: Negative.    Neurological:  Negative for dizziness, seizures, syncope, weakness, light-headedness, numbness and headaches.   Hematological:  Negative for adenopathy. Does not bruise/bleed easily.   Psychiatric/Behavioral: Negative.           Current Outpatient Medications:     Garlic 1000 MG CAPS, Take by mouth, Disp: , Rfl:     turmeric 500 MG CAPS, Take by mouth daily, Disp: , Rfl:     zinc gluconate 50 MG tablet, Take 1 tablet by mouth daily, Disp: , Rfl:     Gemma, Zingiber officinalis, (GEMMA ROOT) 550 MG CAPS, Take by mouth, Disp: , Rfl:     XHANCE 93 MCG/ACT EXHU, USE 1 SPRAY PER NOSTRIL TWICE A DAY, Disp: , Rfl:     mupirocin (BACTROBAN) 2 % ointment, Apply topically, Disp: , Rfl:     montelukast (SINGULAIR) 10 MG tablet, TAKE 1 TABLET AT BEDTIME, Disp: 90 tablet, Rfl: 3    norethindrone-ethinyl estradiol (RAMILA 1/20) 1-20 MG-MCG per tablet, Take 1 tablet by mouth daily, Disp: 3 packet, Rfl: 3    lisinopril (PRINIVIL;ZESTRIL) 10 MG tablet, Take 1 tablet by mouth daily, Disp: 90 tablet, Rfl: 3    levothyroxine (SYNTHROID) 100 MCG tablet, TAKE 1 TABLET DAILY, Disp: 90 tablet, Rfl: 3    triamcinolone (ARISTOCORT) 0.5 % ointment, Apply topically 2 times daily, Disp: 3 each, Rfl: 1    FLUoxetine (PROZAC) 40 MG capsule, Take 1 capsule by mouth daily, Disp: , Rfl:     doxepin (SINEQUAN) 25 MG capsule, take 1 to 2 capsule by mouth at bedtime if needed for 30 DAYS, Disp: , Rfl:     FLUoxetine (PROZAC) 20 MG capsule, Take 1 capsule by mouth in the morning, at noon, and at bedtime, Disp: , Rfl:     clarithromycin (BIAXIN) 250 MG tablet,

## 2025-02-05 NOTE — TELEPHONE ENCOUNTER
Ama Larry called in and stated that she is having intense lower back pain and needs something done now, she stated her pain doctor ordered an injection and it was explained to her that it has to be authorized by her insurance company first but she stated that she wants something done today, according to her Dr. Forbes

## 2025-02-10 ENCOUNTER — APPOINTMENT (OUTPATIENT)
Dept: OTOLARYNGOLOGY | Facility: CLINIC | Age: 55
End: 2025-02-10
Payer: COMMERCIAL

## 2025-02-10 VITALS — HEIGHT: 63 IN | WEIGHT: 145 LBS | BODY MASS INDEX: 25.69 KG/M2

## 2025-02-10 DIAGNOSIS — R09.81 NASAL CONGESTION: Primary | ICD-10-CM

## 2025-02-10 DIAGNOSIS — J34.89 NASAL OBSTRUCTION: ICD-10-CM

## 2025-02-10 PROCEDURE — 3008F BODY MASS INDEX DOCD: CPT | Performed by: STUDENT IN AN ORGANIZED HEALTH CARE EDUCATION/TRAINING PROGRAM

## 2025-02-10 PROCEDURE — 99214 OFFICE O/P EST MOD 30 MIN: CPT | Performed by: STUDENT IN AN ORGANIZED HEALTH CARE EDUCATION/TRAINING PROGRAM

## 2025-02-10 PROCEDURE — 31235 NSL/SINS NDSC DX SPHN SINUSC: CPT | Performed by: STUDENT IN AN ORGANIZED HEALTH CARE EDUCATION/TRAINING PROGRAM

## 2025-02-10 ASSESSMENT — PATIENT HEALTH QUESTIONNAIRE - PHQ9
2. FEELING DOWN, DEPRESSED OR HOPELESS: NOT AT ALL
1. LITTLE INTEREST OR PLEASURE IN DOING THINGS: NOT AT ALL
SUM OF ALL RESPONSES TO PHQ9 QUESTIONS 1 AND 2: 0

## 2025-02-10 NOTE — PROGRESS NOTES
Facial Plastic & Reconstructive Surgery    Referring Provider: Dr. Armando - COMBO CASE  CC: No ref. provider found    Chief complaint: Nasal obstruction/chronic sinus disease.    KYLE Robert is a pleasant 54 y.o. female with chronic sinus disease who is currently being worked up for FESS with Dr. Armando, who presents in consultation for the evaluation of nasal obstruction for possible COMBO CASE with KR.  The patient reports obstructed breathing, which is constant, present year round, does not fluctuate. It affects the patients ability to sleep, exercise, and is troubling during the day.   Symptoms began over three months ago.  The patient has used nasal steroid sprays (Xhance) for greater than 8 weeks without any benefit. The patient has trailed nasal cones/breathe right strips.      Site of obstruction:  bilateral  Previous surgery: Denies  Trauma history: none  Allergic rhinitis, sinusitis history: denies  Smoking: denies  JASWINDER: denies    Aesthetic concern:   none    History obtained from: patient    Past Medical History  She has no past medical history on file.    Past Surgical History  She has no past surgical history on file.     Social History  She reports that she has never smoked. She has never been exposed to tobacco smoke. She has never used smokeless tobacco. No history on file for alcohol use and drug use.    Review of Systems:    11 point ROS was performed. Pertinent positives are for above complaint.  All other systems were negative.     Family History  No family history on file.     Allergies  Prednisone    Physical exam    There were no vitals filed for this visit.    General: No acute distress or pain   Head: Normal cephalic, atraumatic  Eyes: EOMI; PERRL; Normal lids, sclera, conjunctiva, and cornea; No epiphora; No nystagmus   Ears: Normal pinnae without any external deformities  Nose and Sinuses:  See Endoscopy procedure note below for findings  Oral Cavity: High arched  and narrow palate, cross bite with class 2 occlusion with an overjet. Good dentition; Normal gums, tongue, floor of the mouth, retromolar trigone, buccal mucosa, hard palate are normal; Tonsillar fossae, palate, and uvula are normal without lesions or masses; Symmetric palatal elevation; FOM is soft; Tongue is midline upon protrusion without restriction, and without masses or lesions  Neck: Soft, supple; No lymphadenopathy; No thyromegaly or masses  Neuro: Cranial nerve 2-12 intact; Alert and oriented x3  Skin: Warm and well perfused; no lesions or rashes  Respiratory: Chest symmetric with bilateral expansion   Cardiac: No peripheral edema, no varicosities.     A comprehensive facial exam was performed with the following highlights:    Narrow airway bilaterally.  Deep inspiration /  Moderate inspiration produces dynamic collapse of the internal and external valves.   Modified Gladwin Maneuver: Performed with a cotton tipped applicator, minimal improvement with breathing bilaterally.     Nasal analysis:     Radiographic Studies: A CT scan of the sinuses performed. Outside records reviewed. Pertinent radiology and labs reviewed.     Intranasal endoscopic examination performed with limited view on anterior rhinoscopy.    Data reviewed:  pertinent laboratory results and pertinent images    Procedures:    Procedure: Rigid diagnostic nasal endoscopy  Surgeon:  Jaime Dunlap MD  Findings:  A 0-degree rigid endoscope was passed through the patient's bilateral naris via a 3 pass maneuver.  The first pass was along the floor of the nose to the nasopharynx.  The second pass was to the area of the middle meatus.  The third pass was to the sphenoethmoidal recess.    Septum:  Slight deviation with 25% on the both side/sides, no perforations, synechia.   Internal Nasal Valve: Angle is reduced, narrowing the nasal airway bilaterally.    Right nasal cavity:      Inferior turbinate:  2+    Inferior meatus:  Clear, no  discharge, no polyps/masses/lesions    Middle meatus:  Clear, no discharge, no polyps/masses/lesions   Left nasal cavity:    Inferior turbinate:  2+    Inferior meatus:  Clear, no discharge, no polyps/masses/lesions    Middle meatus:  Clear, no discharge, no polyps/masses/lesions   Nasopharynx:  Clear, no discharge, no masses/lesions    Assessment/Plan:     Faith Robert is a pleasant 54 y.o. female presents with significant mechanical nasal obstruction without a marked deviation of her septum.  Additionally, she had minimal improvement in her breathing bilaterally with a modified Hiwot maneuver.  Upon further examination, she was noted to have a high narrow palate that may be contributing to her overall breathing difficulty.  Therefore, I discussed further evaluation with my partner Dr. Elmore to determine if she is a candidate for any maxillofacial Alan procedures. We discussed the medical necessity of this at length, as well as the risks and limitations of the procedures. All questions were answered.     Jaime Dunlap MD      Division of Facial Plastic & Reconstructive Surgery  Department of Otolaryngology - Head and Neck Surgery        P: [phone]  F: [phone]

## 2025-02-11 ENCOUNTER — TELEPHONE (OUTPATIENT)
Dept: PRIMARY CARE CLINIC | Age: 55
End: 2025-02-11

## 2025-02-11 ENCOUNTER — OFFICE VISIT (OUTPATIENT)
Dept: FAMILY MEDICINE CLINIC | Age: 55
End: 2025-02-11
Payer: COMMERCIAL

## 2025-02-11 ENCOUNTER — PATIENT MESSAGE (OUTPATIENT)
Dept: PRIMARY CARE CLINIC | Age: 55
End: 2025-02-11

## 2025-02-11 VITALS
HEART RATE: 101 BPM | DIASTOLIC BLOOD PRESSURE: 78 MMHG | WEIGHT: 145.6 LBS | OXYGEN SATURATION: 98 % | TEMPERATURE: 97.7 F | SYSTOLIC BLOOD PRESSURE: 128 MMHG | BODY MASS INDEX: 25.8 KG/M2 | HEIGHT: 63 IN

## 2025-02-11 DIAGNOSIS — J01.90 ACUTE NON-RECURRENT SINUSITIS, UNSPECIFIED LOCATION: ICD-10-CM

## 2025-02-11 DIAGNOSIS — Z30.41 ENCOUNTER FOR SURVEILLANCE OF CONTRACEPTIVE PILLS: ICD-10-CM

## 2025-02-11 DIAGNOSIS — J06.9 ACUTE UPPER RESPIRATORY INFECTION, UNSPECIFIED: Primary | ICD-10-CM

## 2025-02-11 PROCEDURE — 3017F COLORECTAL CA SCREEN DOC REV: CPT | Performed by: PHYSICIAN ASSISTANT

## 2025-02-11 PROCEDURE — G8419 CALC BMI OUT NRM PARAM NOF/U: HCPCS | Performed by: PHYSICIAN ASSISTANT

## 2025-02-11 PROCEDURE — 99213 OFFICE O/P EST LOW 20 MIN: CPT | Performed by: PHYSICIAN ASSISTANT

## 2025-02-11 PROCEDURE — 3074F SYST BP LT 130 MM HG: CPT | Performed by: PHYSICIAN ASSISTANT

## 2025-02-11 PROCEDURE — 3078F DIAST BP <80 MM HG: CPT | Performed by: PHYSICIAN ASSISTANT

## 2025-02-11 PROCEDURE — 1036F TOBACCO NON-USER: CPT | Performed by: PHYSICIAN ASSISTANT

## 2025-02-11 PROCEDURE — 3028F O2 SATURATION DOC REV: CPT | Performed by: PHYSICIAN ASSISTANT

## 2025-02-11 PROCEDURE — G8427 DOCREV CUR MEDS BY ELIG CLIN: HCPCS | Performed by: PHYSICIAN ASSISTANT

## 2025-02-11 RX ORDER — CEFDINIR 300 MG/1
300 CAPSULE ORAL 2 TIMES DAILY
Qty: 20 CAPSULE | Refills: 0 | Status: SHIPPED | OUTPATIENT
Start: 2025-02-11 | End: 2025-02-21

## 2025-02-11 NOTE — PROGRESS NOTES
25  Ama Juarez : 1970 Sex: female  Age 54 y.o.      Subjective:  Chief Complaint   Patient presents with    Congestion     X3 days    Cough         HPI:     History of Present Illness  The patient is a 54-year-old female who presents for evaluation of an upper respiratory infection.    She reports experiencing symptoms consistent with an upper respiratory infection, including cough, congestion, and drainage. The onset of these symptoms was on Saturday, with the development of drainage and coughing. By , she experienced congestion, which has persisted into Monday and today. She reports no febrile episodes. Her current treatment regimen includes clarithromycin 250 mg due to her immunocompromised status. She also utilizes Xhance nasal spray, administered as one spray in each nostril twice daily, and employs a neti pot for nasal irrigation. Additionally, she uses a facial sauna as part of her symptomatic management. She has previously tried Augmentin but discontinued it due to gastrointestinal side effects. She is uncertain about her past use of cefdinir.    MEDICATIONS  Current: Clarithromycin, Xhance nasal spray.  Discontinued: Augmentin.            ROS:   Unless otherwise stated in this report the patient's positive and negative responses for review of systems for constitutional, eyes, ENT, cardiovascular, respiratory, gastrointestinal, neurological, , musculoskeletal, and integument systems and related systems to the presenting problem are either stated in the history of present illness or were not pertinent or were negative for the symptoms and/or complaints related to the presenting medical problem.  Positives and pertinent negatives as per HPI.  All others reviewed and are negative.      PMH:     Past Medical History:   Diagnosis Date    Anxiety     Common variable immunodeficiency (HCC)     FU with Dr. Lr, no recent issues, stable     Dysmenorrhea     Environmental

## 2025-02-11 NOTE — TELEPHONE ENCOUNTER
The pt is calling because she will need 4 packs of her birth control sent to [facility] instead of 3 because she takes it continuously, she says she doesn't do the week off, she is asking if that can be marked on the script as well

## 2025-02-11 NOTE — TELEPHONE ENCOUNTER
I would recommend she discuss with her GYN then.  Given that she is over the age of 52 and has a history of hypertension, I don't feel comfortable with her being on hormones continuously

## 2025-02-12 ENCOUNTER — TELEPHONE (OUTPATIENT)
Dept: PAIN MANAGEMENT | Age: 55
End: 2025-02-12

## 2025-02-12 DIAGNOSIS — M53.3 DISORDER OF SACRUM: ICD-10-CM

## 2025-02-12 RX ORDER — NORETHINDRONE ACETATE AND ETHINYL ESTRADIOL .02; 1 MG/1; MG/1
1 TABLET ORAL DAILY
Qty: 1 PACKET | Refills: 0 | Status: SHIPPED | OUTPATIENT
Start: 2025-02-12

## 2025-02-12 RX ORDER — NORETHINDRONE ACETATE AND ETHINYL ESTRADIOL .02; 1 MG/1; MG/1
1 TABLET ORAL DAILY
Qty: 4 PACKET | Refills: 3 | Status: CANCELLED | OUTPATIENT
Start: 2025-02-12

## 2025-02-12 RX ORDER — NORETHINDRONE ACETATE AND ETHINYL ESTRADIOL .02; 1 MG/1; MG/1
1 TABLET ORAL DAILY
Qty: 84 TABLET | OUTPATIENT
Start: 2025-02-12

## 2025-02-12 NOTE — TELEPHONE ENCOUNTER
Call to Ama Juarez that procedure was scheduled for 02/24/2025 and that [facility] should call her a few days before for the pre op call and between 2:00 PM and 4:00 PM  the business day before with the arrival time. Instructed to call office back if any questions. Ama verbalized understanding.    Electronically signed by Essence Hernandez RN on 2/12/2025 at 5:14 PM

## 2025-02-13 RX ORDER — NORETHINDRONE ACETATE AND ETHINYL ESTRADIOL .02; 1 MG/1; MG/1
1 TABLET ORAL DAILY
Qty: 84 TABLET | OUTPATIENT
Start: 2025-02-13

## 2025-02-13 NOTE — PROGRESS NOTES
Patient called in to [facility] and stated that her procedure was changed to February 24, 2025 by Dr John's office in [place]. She stated has been started on antibiotics and was told need to postpone her procedure. Message left with Essence from Dr John's office for clarification.

## 2025-02-18 NOTE — PROGRESS NOTES
[facility]  INSTRUCTIONS  ...........................................................................................................................................    [x] Parking the day of Surgery is located in the Main Entrance lot.  Upon entering the door, make immediate right into the surgery reception room    [x]  Bring photo ID and insurance card    [x] You may have a light breakfast day of procedure    [x]  Wear loose comfortable clothing    [x]  Please follow instructions for medications as given per Dr's office    [x] You can expect a call the business day prior to procedure to notify you of your arrival time     [] Please arrange for  - may drive self per preop orders    []  Other instructions

## 2025-02-24 ENCOUNTER — HOSPITAL ENCOUNTER (OUTPATIENT)
Age: 55
Setting detail: OUTPATIENT SURGERY
Discharge: HOME | End: 2025-02-24
Attending: ANESTHESIOLOGY | Admitting: ANESTHESIOLOGY
Payer: COMMERCIAL

## 2025-02-24 ENCOUNTER — HOSPITAL ENCOUNTER (OUTPATIENT)
Dept: GENERAL RADIOLOGY | Age: 55
Setting detail: OUTPATIENT SURGERY
Discharge: HOME | End: 2025-02-26
Attending: ANESTHESIOLOGY
Payer: COMMERCIAL

## 2025-02-24 VITALS
HEIGHT: 63 IN | RESPIRATION RATE: 16 BRPM | SYSTOLIC BLOOD PRESSURE: 148 MMHG | TEMPERATURE: 98.7 F | HEART RATE: 90 BPM | BODY MASS INDEX: 25.69 KG/M2 | WEIGHT: 145 LBS | OXYGEN SATURATION: 99 % | DIASTOLIC BLOOD PRESSURE: 83 MMHG

## 2025-02-24 DIAGNOSIS — R52 PAIN MANAGEMENT: ICD-10-CM

## 2025-02-24 PROCEDURE — 2709999900 HC NON-CHARGEABLE SUPPLY: Performed by: ANESTHESIOLOGY

## 2025-02-24 PROCEDURE — 7100000011 HC PHASE II RECOVERY - ADDTL 15 MIN: Performed by: ANESTHESIOLOGY

## 2025-02-24 PROCEDURE — 27096 INJECT SACROILIAC JOINT: CPT | Performed by: ANESTHESIOLOGY

## 2025-02-24 PROCEDURE — 7100000010 HC PHASE II RECOVERY - FIRST 15 MIN: Performed by: ANESTHESIOLOGY

## 2025-02-24 PROCEDURE — 3600000002 HC SURGERY LEVEL 2 BASE: Performed by: ANESTHESIOLOGY

## 2025-02-24 PROCEDURE — 6360000002 HC RX W HCPCS: Performed by: ANESTHESIOLOGY

## 2025-02-24 PROCEDURE — 6360000004 HC RX CONTRAST MEDICATION: Performed by: ANESTHESIOLOGY

## 2025-02-24 RX ORDER — SODIUM CHLORIDE 0.9 % (FLUSH) 0.9 %
5-40 SYRINGE (ML) INJECTION EVERY 12 HOURS SCHEDULED
Status: DISCONTINUED | OUTPATIENT
Start: 2025-02-24 | End: 2025-02-24 | Stop reason: HOSPADM

## 2025-02-24 RX ORDER — LIDOCAINE HYDROCHLORIDE 5 MG/ML
INJECTION, SOLUTION INFILTRATION; INTRAVENOUS PRN
Status: DISCONTINUED | OUTPATIENT
Start: 2025-02-24 | End: 2025-02-24 | Stop reason: ALTCHOICE

## 2025-02-24 RX ORDER — SODIUM CHLORIDE 9 MG/ML
INJECTION, SOLUTION INTRAVENOUS PRN
Status: DISCONTINUED | OUTPATIENT
Start: 2025-02-24 | End: 2025-02-24 | Stop reason: HOSPADM

## 2025-02-24 RX ORDER — IOPAMIDOL 612 MG/ML
INJECTION, SOLUTION INTRATHECAL PRN
Status: DISCONTINUED | OUTPATIENT
Start: 2025-02-24 | End: 2025-02-24 | Stop reason: ALTCHOICE

## 2025-02-24 RX ORDER — BUPIVACAINE HYDROCHLORIDE 2.5 MG/ML
INJECTION, SOLUTION EPIDURAL; INFILTRATION; INTRACAUDAL PRN
Status: DISCONTINUED | OUTPATIENT
Start: 2025-02-24 | End: 2025-02-24 | Stop reason: ALTCHOICE

## 2025-02-24 RX ORDER — SODIUM CHLORIDE 0.9 % (FLUSH) 0.9 %
5-40 SYRINGE (ML) INJECTION PRN
Status: DISCONTINUED | OUTPATIENT
Start: 2025-02-24 | End: 2025-02-24 | Stop reason: HOSPADM

## 2025-02-24 RX ORDER — TRIAMCINOLONE ACETONIDE 40 MG/ML
INJECTION, SUSPENSION INTRA-ARTICULAR; INTRAMUSCULAR PRN
Status: DISCONTINUED | OUTPATIENT
Start: 2025-02-24 | End: 2025-02-24 | Stop reason: ALTCHOICE

## 2025-02-24 ASSESSMENT — PAIN DESCRIPTION - DESCRIPTORS
DESCRIPTORS: ACHING;DULL;DISCOMFORT
DESCRIPTORS: ACHING;DULL;DISCOMFORT
DESCRIPTORS: ACHING
DESCRIPTORS: ACHING;DULL;DISCOMFORT

## 2025-02-24 ASSESSMENT — PAIN - FUNCTIONAL ASSESSMENT
PAIN_FUNCTIONAL_ASSESSMENT: 0-10
PAIN_FUNCTIONAL_ASSESSMENT: ACTIVITIES ARE NOT PREVENTED
PAIN_FUNCTIONAL_ASSESSMENT: ACTIVITIES ARE NOT PREVENTED
PAIN_FUNCTIONAL_ASSESSMENT: 0-10
PAIN_FUNCTIONAL_ASSESSMENT: ACTIVITIES ARE NOT PREVENTED

## 2025-02-24 ASSESSMENT — PAIN DESCRIPTION - LOCATION: LOCATION: HIP

## 2025-02-24 ASSESSMENT — PAIN SCALES - GENERAL: PAINLEVEL_OUTOF10: 7

## 2025-02-24 ASSESSMENT — PAIN DESCRIPTION - ORIENTATION: ORIENTATION: LEFT

## 2025-02-24 ASSESSMENT — PAIN DESCRIPTION - PAIN TYPE: TYPE: CHRONIC PAIN

## 2025-02-24 NOTE — INTERVAL H&P NOTE
Update History & Physical    The patient's History and Physical of February 3, 2025 was reviewed with the patient and I examined the patient. There was no change. The surgical site was confirmed by the patient and me.     Plan: The risks, benefits, expected outcome, and alternative to the recommended procedure have been discussed with the patient. Patient understands and wants to proceed with the procedure.     Electronically signed by Von John MD on 2/24/2025

## 2025-02-24 NOTE — OP NOTE
Operative Note      Patient: Ama Juarez  YOB: 1970  MRN: [id number]    Date of Procedure: 2025    Pre-Op Diagnosis Codes:      * Disorder of sacrum [M53.3]    Post-Op Diagnosis: Same       Procedure(s):  LEFT SACROILIAC JOINT INJECTION UNDER FLUOROSCOPIC GUIDANCE    Surgeon(s):  Von John MD    Assistant:   * No surgical staff found *    Anesthesia: Local    Estimated Blood Loss (mL): Minimal    Complications: None    Specimens:   * No specimens in log *    Implants:  * No implants in log *      Drains: * No LDAs found *    Findings:  Infection Present At Time Of Surgery (PATOS) (choose all levels that have infection present):  No infection present  Other Findings: good needle placement    Detailed Description of Procedure:   2025    Patient: Ama Juarez  :  1970  Age:  54 y.o.  Sex:  female     PRE-OPERATIVE DIAGNOSIS:      Sacroiliac dysfunction    POST-OPERATIVE DIAGNOSIS: Same.    PROCEDURE:  Fluoroscopic guided Left   sacroiliac joint injection with steroid.    SURGEON:  Von John MD    ANESTHESIA: Local    ESTIMATED BLOOD LOSS: None.  ______________________________________________________________________  BRIEF HISTORY:  Ama Juarez comes in today for  Left sacroiliac joint injection under fluoroscopic guidance. The potential complications as well as the procedure in detail were explained to her today. She has elected to undergo the aforementioned procedure.    Ama's complete History & Physical examination were reviewed in depth, a copy of which is in the chart.      DESCRIPTION OF PROCEDURE:    After confirming written and informed consent, a time-out was performed and Ama’s name and date of birth, the procedure to be performed as well as the plan for the location of the needle insertion were confirmed.    The patient was brought into the procedure room and placed in the prone position on the fluoroscopy table. Standard monitors

## 2025-02-24 NOTE — DISCHARGE INSTRUCTIONS
[facility] Pain Management Department  [place] [phone]  Dr. Alvaro Howard   Post-Pain Block/Radiofrequency  Home Going Instructions    1-Go home, rest for the remainder of the day  2-Please do not lift over 20 pounds the day of the injection  3-If you received sedation No: alcohol, driving, operating lawn mowers, plows, tractors or other dangerous equipment until next morning. Do not make important decisions or sign legal documents for 24 hours. You may experience light headedness, dizziness, nausea or sleepiness after sedation. Do not stay alone. A responsible adult must be with you for 24 hours. You could be nauseated from the medications you have received. Your IV site may be sore and bruised.    4-No dietary restrictions     5-Resume all medications the same day, blood thinners to be resumed 24 hours after injection if you were instructed to stop any.    6-Keep the surgical site clean and dry, you may shower the next morning and remove the      dressing.     7- No sitz baths, tub baths or hot tubs/swimming for 24 hours.       8- If you have any pain at the injection site(s), application of an ice pack to the area should be       helpful, 20 minutes on/20 minutes off for next 48 hours.  9- Call [facility] immediately at if you develop.  Fever greater than 100.4 F  Have bleeding or drainage from the puncture site  Have progressive Leg/arm numbness and or weakness  Loss of control of bowel and or bladder (wet/soil yourself)  Severe headache with inability to lift head  10-You may return to work the next day

## 2025-02-26 ENCOUNTER — TELEPHONE (OUTPATIENT)
Dept: OTOLARYNGOLOGY | Facility: CLINIC | Age: 55
End: 2025-02-26
Payer: COMMERCIAL

## 2025-02-26 NOTE — PROGRESS NOTES
"2/28/2025 Follow up visit for evaluation for possible maxillofacial Alan procedures.    Referral from Dr Dunlap and on 2/10/2025 he commented:  \"Faith Robert is a pleasant 54 y.o. female presents with significant mechanical nasal obstruction without a marked deviation of her septum.  Additionally, she had minimal improvement in her breathing bilaterally with a modified Hiwot maneuver.  Upon further examination, she was noted to have a high narrow palate that may be contributing to her overall breathing difficulty.  Therefore, I discussed further evaluation with my partner Dr. Elmore to determine if she is a candidate for any maxillofacial Alan procedures. We discussed the medical necessity of this at length, as well as the risks and limitations of the procedures. All questions were answered. \"    CT sinus on 10/21/2024    [place] sleepiness scale  0/24  Fatigue severity scale  23/63  Nasal obstruction and Septoplasty effectiveness scale 18/20  Nasal obstruction VAS 7/10  Snoring VAS  1/10      Site of obstruction:  bilateral  Previous surgery: Denies  Trauma history: none  Allergic rhinitis, sinusitis history: denies  Smoking: denies  JASWINDER: denies     Aesthetic concern:   none     History obtained from: patient        Social History  She reports that she has never smoked. She has never been exposed to tobacco smoke. She has never used smokeless tobacco. No history on file for alcohol use and drug use.     Review of Systems:    11 point ROS was performed. Pertinent positives are for above complaint.  All other systems were negative.      Family History  Family History   No family history on file.           Physical exam     There were no vitals filed for this visit.     General: No acute distress or pain   Head: Normal cephalic, atraumatic  Eyes: EOMI; PERRL; Normal lids, sclera, conjunctiva, and cornea; No epiphora; No nystagmus   Ears: Normal pinnae without any external deformities  Nose and " Sinuses:  See Endoscopy procedure note below for findings  Oral Cavity: High arched and narrow palate, cross bite with class 2 occlusion with an overjet. Good dentition; Normal gums, tongue, floor of the mouth, retromolar trigone, buccal mucosa, hard palate are normal; Tonsillar fossae, palate, and uvula are normal without lesions or masses; Symmetric palatal elevation; FOM is soft; Tongue is midline upon protrusion without restriction, and without masses or lesions  Neck: Soft, supple; No lymphadenopathy; No thyromegaly or masses  Neuro: Cranial nerve 2-12 intact; Alert and oriented x3  Skin: Warm and well perfused; no lesions or rashes  Respiratory: Chest symmetric with bilateral expansion   Cardiac: No peripheral edema, no varicosities.      A comprehensive facial exam was performed with the following highlights:     Narrow airway bilaterally.  Deep inspiration /  Moderate inspiration produces dynamic collapse of the internal and external valves.   Modified Winn Maneuver: Performed with a cotton tipped applicator, minimal improvement with breathing bilaterally.      Nasal analysis:     Procedure: Diagnostic Nasal/ Pharyngeal Endoscopy     Indication for procedure: To evaluate static and dynamic upper anatomy not visible by anterior rhinoscopy and oral cavitiy examination for anatomic risk factors of obstructive sleep apnea.      Anesthesia: 1% phenylephrine,4% lidocaine topical spray     Description:   A flexible endoscope was used to examine the left and right nasal cavities.  The nasal valve areas were examined for abnormalities or collapse.  The inferior and middle turbinates were evaluated and abnormalities noted. The scope was then passed through the nasopharygneal, oropharyngeal, and hypopharyngeal airway.                    Septum:  Slight deviation with 25% on the both side/sides, no perforations, synechia.              Internal Nasal Valve: Angle is reduced, narrowing the nasal airway bilaterally.                Right nasal cavity:                            Inferior turbinate:  2+                          Inferior meatus:  Clear, no discharge, no polyps/masses/lesions                          Middle meatus:  Clear, no discharge, no polyps/masses/lesions              Left nasal cavity:                          Inferior turbinate:  2+                          Inferior meatus:  Clear, no discharge, no polyps/masses/lesions                          Middle meatus:  Clear, no discharge, no polyps/masses/lesions              Nasopharynx:  Clear, no discharge, no masses/lesions    Diagnose:  Chronic Nasal Obstruction not responsive to treatment      Plan:  Rhinoseptoplasty  Blocked nasal breathing is due to a nasal valve problem -  Valve problem is due to collapse and is main cause of nasal blockage -  Batten graft to provide support to lateral wall

## 2025-02-26 NOTE — TELEPHONE ENCOUNTER
Topic: Earlier appointment with Dr Elmore     Pt has called multiple times over the past week and a half to request an earlier appt with Dr Elmore.  Pt was told that I would keep an eye out for an earlier time and that her 3/21 appointment was placed on a wait list.    There has been a cancellation on 2/28 in [place].  I called pt and offered this earlier appt time/date and she accepted.    Pt will see Dr Elmore on 2/28 at 2:15pm

## 2025-02-28 ENCOUNTER — TELEPHONE (OUTPATIENT)
Dept: PRIMARY CARE CLINIC | Age: 55
End: 2025-02-28

## 2025-02-28 ENCOUNTER — OFFICE VISIT (OUTPATIENT)
Dept: OTOLARYNGOLOGY | Facility: CLINIC | Age: 55
End: 2025-02-28
Payer: COMMERCIAL

## 2025-02-28 VITALS
TEMPERATURE: 98.3 F | SYSTOLIC BLOOD PRESSURE: 150 MMHG | BODY MASS INDEX: 25.52 KG/M2 | HEART RATE: 104 BPM | WEIGHT: 144 LBS | DIASTOLIC BLOOD PRESSURE: 104 MMHG | HEIGHT: 63 IN

## 2025-02-28 DIAGNOSIS — J34.89 NASAL OBSTRUCTION: Primary | ICD-10-CM

## 2025-02-28 PROCEDURE — 99214 OFFICE O/P EST MOD 30 MIN: CPT

## 2025-02-28 PROCEDURE — 31575 DIAGNOSTIC LARYNGOSCOPY: CPT

## 2025-02-28 PROCEDURE — 3008F BODY MASS INDEX DOCD: CPT

## 2025-02-28 SDOH — ECONOMIC STABILITY: FOOD INSECURITY: WITHIN THE PAST 12 MONTHS, THE FOOD YOU BOUGHT JUST DIDN'T LAST AND YOU DIDN'T HAVE MONEY TO GET MORE.: NEVER TRUE

## 2025-02-28 SDOH — ECONOMIC STABILITY: FOOD INSECURITY: WITHIN THE PAST 12 MONTHS, YOU WORRIED THAT YOUR FOOD WOULD RUN OUT BEFORE YOU GOT MONEY TO BUY MORE.: NEVER TRUE

## 2025-02-28 ASSESSMENT — ENCOUNTER SYMPTOMS
OCCASIONAL FEELINGS OF UNSTEADINESS: 0
LOSS OF SENSATION IN FEET: 0
DEPRESSION: 0

## 2025-02-28 ASSESSMENT — LIFESTYLE VARIABLES
HOW OFTEN DO YOU HAVE SIX OR MORE DRINKS ON ONE OCCASION: NEVER
SKIP TO QUESTIONS 9-10: 1
HOW OFTEN DO YOU HAVE A DRINK CONTAINING ALCOHOL: NEVER
AUDIT-C TOTAL SCORE: 0
HOW MANY STANDARD DRINKS CONTAINING ALCOHOL DO YOU HAVE ON A TYPICAL DAY: PATIENT DOES NOT DRINK

## 2025-02-28 ASSESSMENT — PATIENT HEALTH QUESTIONNAIRE - PHQ9
SUM OF ALL RESPONSES TO PHQ9 QUESTIONS 1 AND 2: 0
1. LITTLE INTEREST OR PLEASURE IN DOING THINGS: NOT AT ALL
2. FEELING DOWN, DEPRESSED OR HOPELESS: NOT AT ALL

## 2025-02-28 ASSESSMENT — COLUMBIA-SUICIDE SEVERITY RATING SCALE - C-SSRS
6. HAVE YOU EVER DONE ANYTHING, STARTED TO DO ANYTHING, OR PREPARED TO DO ANYTHING TO END YOUR LIFE?: NO
1. IN THE PAST MONTH, HAVE YOU WISHED YOU WERE DEAD OR WISHED YOU COULD GO TO SLEEP AND NOT WAKE UP?: NO
2. HAVE YOU ACTUALLY HAD ANY THOUGHTS OF KILLING YOURSELF?: NO

## 2025-02-28 ASSESSMENT — PAIN SCALES - GENERAL: PAINLEVEL_OUTOF10: 0-NO PAIN

## 2025-02-28 NOTE — TELEPHONE ENCOUNTER
Does patient have a BP cuff at home to monitor her BP?  I would recommend checking it at home and keeping a log and then let us know the values

## 2025-02-28 NOTE — TELEPHONE ENCOUNTER
Patient called and stated that she went for a BP check yesterday with GYN Dr. Grijalva. She stated that her systolic BP was in the 140s but the diastolic was normal. She states she is under increased stress and believes this is effecting it. Gyn recommended med be increased but advised pcp to f/u with pcp. Please advise.

## 2025-03-05 ENCOUNTER — TELEPHONE (OUTPATIENT)
Dept: OTOLARYNGOLOGY | Facility: CLINIC | Age: 55
End: 2025-03-05
Payer: COMMERCIAL

## 2025-03-05 NOTE — TELEPHONE ENCOUNTER
Topic: Referral to orthodontist vs surgery with Dr Elmore     Pt called my office asking for a referral to orthodontist to consider medical palate expansion.    Dr Elmore aware and said the patient does have the option of Rhinoseptoplasty and bilateral turbinates or she can have orhtodontist perform Medical palate expansion.    Pt aware that Dr Elmore is ok with orthodontist referral, but would need to know by the first few days of April as to whether or not she wants to cancel surgery schedule on 4/14.    Pt verbalized understanding and will call me back with the orthodontist name and phone/fax so we can send all needed documents.

## 2025-03-06 ENCOUNTER — TELEPHONE (OUTPATIENT)
Dept: PRIMARY CARE CLINIC | Age: 55
End: 2025-03-06

## 2025-03-06 ENCOUNTER — HOSPITAL ENCOUNTER (OUTPATIENT)
Facility: HOSPITAL | Age: 55
Setting detail: OUTPATIENT SURGERY
End: 2025-03-06
Payer: COMMERCIAL

## 2025-03-06 DIAGNOSIS — J34.2 DEVIATED NASAL SEPTUM: ICD-10-CM

## 2025-03-06 DIAGNOSIS — J34.89 NASAL OBSTRUCTION: ICD-10-CM

## 2025-03-06 DIAGNOSIS — J34.3 HYPERTROPHY OF NASAL TURBINATES: ICD-10-CM

## 2025-03-06 NOTE — TELEPHONE ENCOUNTER
I would not recommend any herbal supplements while on oral birth control pills.  These can increase risk of clotting

## 2025-03-06 NOTE — TELEPHONE ENCOUNTER
Double Lisinopril to 20 mg.  However, if the BP is elevated- even if controlled with BP medications- she should not continue with birth control.  Puts her at an increased risk of stroke.

## 2025-03-06 NOTE — TELEPHONE ENCOUNTER
Concerned about elevated bp.   2/27 136/89  2/28 131/93  3/1  126/92   pm 136/96  3/2 135/92  pm 133/93  She is taking lisinopril 10mg daily not having any symptoms. She is concerned she wont be able to get a refill on birth control due to elevated bp.

## 2025-03-06 NOTE — TELEPHONE ENCOUNTER
States went to health food store and bought hibiscus tea, and hawthorn tea, and blood pressure tea. States drinking upward of 3 cups of tea of each type every day. Asking if it is okay to continue these.

## 2025-03-21 ENCOUNTER — APPOINTMENT (OUTPATIENT)
Dept: OTOLARYNGOLOGY | Facility: CLINIC | Age: 55
End: 2025-03-21
Payer: COMMERCIAL

## 2025-03-27 DIAGNOSIS — I10 PRIMARY HYPERTENSION: ICD-10-CM

## 2025-03-27 RX ORDER — LISINOPRIL 20 MG/1
20 TABLET ORAL DAILY
Qty: 90 TABLET | Refills: 1 | Status: SHIPPED | OUTPATIENT
Start: 2025-03-27

## 2025-03-31 ENCOUNTER — OFFICE VISIT (OUTPATIENT)
Dept: FAMILY MEDICINE CLINIC | Age: 55
End: 2025-03-31
Payer: COMMERCIAL

## 2025-03-31 VITALS
HEART RATE: 99 BPM | BODY MASS INDEX: 24.8 KG/M2 | TEMPERATURE: 97.4 F | OXYGEN SATURATION: 96 % | SYSTOLIC BLOOD PRESSURE: 116 MMHG | WEIGHT: 140 LBS | DIASTOLIC BLOOD PRESSURE: 84 MMHG

## 2025-03-31 DIAGNOSIS — J01.90 ACUTE NON-RECURRENT SINUSITIS, UNSPECIFIED LOCATION: Primary | ICD-10-CM

## 2025-03-31 LAB
BASOPHILS # BLD: 0.04 K/UL (ref 0–0.2)
BASOPHILS NFR BLD: 1 % (ref 0–2)
EOSINOPHIL # BLD: 0.02 K/UL (ref 0.05–0.5)
EOSINOPHILS RELATIVE PERCENT: 0 % (ref 0–6)
ERYTHROCYTE [DISTWIDTH] IN BLOOD BY AUTOMATED COUNT: 13.2 % (ref 11.5–15)
HCT VFR BLD AUTO: 47.6 % (ref 34–48)
HGB BLD-MCNC: 15.5 G/DL (ref 11.5–15.5)
IMM GRANULOCYTES # BLD AUTO: <0.03 K/UL (ref 0–0.58)
IMM GRANULOCYTES NFR BLD: 0 % (ref 0–5)
LYMPHOCYTES NFR BLD: 1.73 K/UL (ref 1.5–4)
LYMPHOCYTES RELATIVE PERCENT: 21 % (ref 20–42)
MCH RBC QN AUTO: 30.6 PG (ref 26–35)
MCHC RBC AUTO-ENTMCNC: 32.6 G/DL (ref 32–34.5)
MCV RBC AUTO: 93.9 FL (ref 80–99.9)
MONOCYTES NFR BLD: 0.48 K/UL (ref 0.1–0.95)
MONOCYTES NFR BLD: 6 % (ref 2–12)
NEUTROPHILS NFR BLD: 73 % (ref 43–80)
NEUTS SEG NFR BLD: 6.08 K/UL (ref 1.8–7.3)
PLATELET # BLD AUTO: 282 K/UL (ref 130–450)
PMV BLD AUTO: 10.2 FL (ref 7–12)
RBC # BLD AUTO: 5.07 M/UL (ref 3.5–5.5)
WBC OTHER # BLD: 8.4 K/UL (ref 4.5–11.5)

## 2025-03-31 PROCEDURE — 3079F DIAST BP 80-89 MM HG: CPT | Performed by: PHYSICIAN ASSISTANT

## 2025-03-31 PROCEDURE — 3074F SYST BP LT 130 MM HG: CPT | Performed by: PHYSICIAN ASSISTANT

## 2025-03-31 PROCEDURE — 1036F TOBACCO NON-USER: CPT | Performed by: PHYSICIAN ASSISTANT

## 2025-03-31 PROCEDURE — 99214 OFFICE O/P EST MOD 30 MIN: CPT | Performed by: PHYSICIAN ASSISTANT

## 2025-03-31 PROCEDURE — G8420 CALC BMI NORM PARAMETERS: HCPCS | Performed by: PHYSICIAN ASSISTANT

## 2025-03-31 PROCEDURE — 3017F COLORECTAL CA SCREEN DOC REV: CPT | Performed by: PHYSICIAN ASSISTANT

## 2025-03-31 PROCEDURE — G8427 DOCREV CUR MEDS BY ELIG CLIN: HCPCS | Performed by: PHYSICIAN ASSISTANT

## 2025-03-31 NOTE — PROGRESS NOTES
3/31/25  Ama Juarez : 1970 Sex: female  Age 54 y.o.      Subjective:  Chief Complaint   Patient presents with    Congestion     Started on friday    Drainage    Sore Throat         HPI:     History of Present Illness  The patient is a 54-year-old female who presents for evaluation of a viral infection.    Symptoms began on Friday, leading her to visit an urgent care facility in [facility] on Saturday. The diagnosis was a viral infection, characterized by extreme fatigue, headache, and nasal congestion. Over-the-counter medications such as DayQuil or NyQuil have not been used due to her history of hypertension. No cough is reported, but she confirms throat discomfort, congestion, and persistent fatigue. No other associated pains are noted.    She has a prescription from her immunologist's office on her phone and was informed that she can take it to any blood lab.    SOCIAL HISTORY  She does not smoke.            ROS:   Unless otherwise stated in this report the patient's positive and negative responses for review of systems for constitutional, eyes, ENT, cardiovascular, respiratory, gastrointestinal, neurological, , musculoskeletal, and integument systems and related systems to the presenting problem are either stated in the history of present illness or were not pertinent or were negative for the symptoms and/or complaints related to the presenting medical problem.  Positives and pertinent negatives as per HPI.  All others reviewed and are negative.      PMH:     Past Medical History:   Diagnosis Date    Anxiety     Common variable immunodeficiency (HCC)     FU with Dr. Lr, no recent issues, stable     Dysmenorrhea     Environmental allergies     Hypertension     Kidney stone     Sacroiliac dysfunction     Thyroid disease        Past Surgical History:   Procedure Laterality Date    BACK INJECTION Left 2025    LEFT SACROILIAC JOINT INJECTION UNDER FLUOROSCOPIC GUIDANCE performed by

## 2025-04-01 LAB
IGA SERPL-MCNC: 200 MG/DL (ref 70–400)
IGG SERPL-MCNC: 648 MG/DL (ref 700–1600)
IGM SERPL-MCNC: 218 MG/DL (ref 40–230)

## 2025-04-02 ENCOUNTER — TELEPHONE (OUTPATIENT)
Dept: OTOLARYNGOLOGY | Facility: HOSPITAL | Age: 55
End: 2025-04-02
Payer: COMMERCIAL

## 2025-04-02 NOTE — TELEPHONE ENCOUNTER
Topic: cancel surgery on 4/14/2025    Pt called at the end of last week asking to cancel her surgery on 4/14 with Dr Elmore.  I talked with pt today and confirmed her wishes to cancel surgery on 4/14.  Per pt no plan to reschedule.    Surgery and POV will be canceled

## 2025-04-03 ENCOUNTER — OFFICE VISIT (OUTPATIENT)
Dept: FAMILY MEDICINE CLINIC | Age: 55
End: 2025-04-03
Payer: COMMERCIAL

## 2025-04-03 VITALS
RESPIRATION RATE: 16 BRPM | HEIGHT: 63 IN | OXYGEN SATURATION: 99 % | TEMPERATURE: 98.5 F | HEART RATE: 113 BPM | WEIGHT: 140 LBS | BODY MASS INDEX: 24.8 KG/M2 | DIASTOLIC BLOOD PRESSURE: 78 MMHG | SYSTOLIC BLOOD PRESSURE: 128 MMHG

## 2025-04-03 DIAGNOSIS — J01.90 ACUTE NON-RECURRENT SINUSITIS, UNSPECIFIED LOCATION: Primary | ICD-10-CM

## 2025-04-03 PROCEDURE — 3078F DIAST BP <80 MM HG: CPT | Performed by: PHYSICIAN ASSISTANT

## 2025-04-03 PROCEDURE — 1036F TOBACCO NON-USER: CPT | Performed by: PHYSICIAN ASSISTANT

## 2025-04-03 PROCEDURE — 3074F SYST BP LT 130 MM HG: CPT | Performed by: PHYSICIAN ASSISTANT

## 2025-04-03 PROCEDURE — 3017F COLORECTAL CA SCREEN DOC REV: CPT | Performed by: PHYSICIAN ASSISTANT

## 2025-04-03 PROCEDURE — 99214 OFFICE O/P EST MOD 30 MIN: CPT | Performed by: PHYSICIAN ASSISTANT

## 2025-04-03 PROCEDURE — G8420 CALC BMI NORM PARAMETERS: HCPCS | Performed by: PHYSICIAN ASSISTANT

## 2025-04-03 PROCEDURE — G8427 DOCREV CUR MEDS BY ELIG CLIN: HCPCS | Performed by: PHYSICIAN ASSISTANT

## 2025-04-03 RX ORDER — TRIAMCINOLONE ACETONIDE 40 MG/ML
40 INJECTION, SUSPENSION INTRA-ARTICULAR; INTRAMUSCULAR ONCE
Status: DISCONTINUED | OUTPATIENT
Start: 2025-04-03 | End: 2025-04-03

## 2025-04-03 RX ORDER — AZELASTINE 1 MG/ML
2 SPRAY, METERED NASAL 2 TIMES DAILY
Qty: 120 ML | Refills: 1 | Status: SHIPPED | OUTPATIENT
Start: 2025-04-03

## 2025-04-03 RX ORDER — HYDROXYZINE HYDROCHLORIDE 10 MG/5ML
4 SYRUP ORAL EVERY 6 HOURS PRN
Qty: 20 TABLET | Refills: 0 | Status: SHIPPED | OUTPATIENT
Start: 2025-04-03

## 2025-04-03 RX ORDER — CEFDINIR 300 MG/1
300 CAPSULE ORAL 2 TIMES DAILY
Qty: 20 CAPSULE | Refills: 0 | Status: SHIPPED | OUTPATIENT
Start: 2025-04-03 | End: 2025-04-13

## 2025-04-03 NOTE — PROGRESS NOTES
4/3/25  Ama Juarez : 1970 Sex: female  Age 54 y.o.      Subjective:  Chief Complaint   Patient presents with    Follow-up     Here Monday and feels worse         HPI:     History of Present Illness  The patient is a 54-year-old female who presents for evaluation of sneezing and congestion.    Persistent sneezing and nasal congestion are reported, which have not been alleviated by her current medication regimen. Increased fluid intake and the use of a neti pot have been utilized as additional measures. No associated fevers or chills are noted. Biaxin has been discontinued. She has a history of an immunocompromised state and is under the care of an immunologist, Dr. Lr, in [place]. A scheduled appointment with him is on 2025 due to concerns that her current medication may have lost its efficacy. She is a non-smoker. Adverse reactions to oral prednisone, including profuse sweating, have been experienced, but injectable forms are tolerated well. Adherence to her prescribed Augmentin regimen is reported without any missed doses, ensuring it is taken with meals. Excessive sleep is also reported.    SOCIAL HISTORY  She does not smoke.            ROS:   Unless otherwise stated in this report the patient's positive and negative responses for review of systems for constitutional, eyes, ENT, cardiovascular, respiratory, gastrointestinal, neurological, , musculoskeletal, and integument systems and related systems to the presenting problem are either stated in the history of present illness or were not pertinent or were negative for the symptoms and/or complaints related to the presenting medical problem.  Positives and pertinent negatives as per HPI.  All others reviewed and are negative.      PMH:     Past Medical History:   Diagnosis Date    Anxiety     Common variable immunodeficiency     FU with Dr. rL, no recent issues, stable     Dysmenorrhea     Environmental allergies

## 2025-04-04 ENCOUNTER — TELEPHONE (OUTPATIENT)
Dept: FAMILY MEDICINE CLINIC | Age: 55
End: 2025-04-04

## 2025-04-04 ENCOUNTER — OFFICE VISIT (OUTPATIENT)
Dept: FAMILY MEDICINE CLINIC | Age: 55
End: 2025-04-04
Payer: COMMERCIAL

## 2025-04-04 VITALS
DIASTOLIC BLOOD PRESSURE: 72 MMHG | WEIGHT: 140 LBS | RESPIRATION RATE: 19 BRPM | HEART RATE: 95 BPM | TEMPERATURE: 97.8 F | SYSTOLIC BLOOD PRESSURE: 118 MMHG | BODY MASS INDEX: 24.81 KG/M2 | OXYGEN SATURATION: 98 %

## 2025-04-04 DIAGNOSIS — J01.90 ACUTE NON-RECURRENT SINUSITIS, UNSPECIFIED LOCATION: Primary | ICD-10-CM

## 2025-04-04 LAB
PNEUMOCOCCAL ANTIBODY TYPE 12: 0.06 UG/ML
PNEUMOCOCCAL ANTIBODY TYPE 14: 0.06 UG/ML
PNEUMOCOCCAL ANTIBODY TYPE 18: 1.48 UG/ML
PNEUMOCOCCAL ANTIBODY TYPE 19F: 4.9 UG/ML
PNEUMOCOCCAL ANTIBODY TYPE 1: 1.64 UG/ML
PNEUMOCOCCAL ANTIBODY TYPE 23: 0.38 UG/ML
PNEUMOCOCCAL ANTIBODY TYPE 3: 0.15 UG/ML
PNEUMOCOCCAL ANTIBODY TYPE 4: 0.31 UG/ML
PNEUMOCOCCAL ANTIBODY TYPE 5: 0.68 UG/ML
PNEUMOCOCCAL ANTIBODY TYPE 6B: 1.31 UG/ML
PNEUMOCOCCAL ANTIBODY TYPE 7F: 1.48 UG/ML
PNEUMOCOCCAL ANTIBODY TYPE 8: 0.13 UG/ML
PNEUMOCOCCAL ANTIBODY TYPE 9N: 0.13 UG/ML
PNEUMOCOCCAL ANTIBODY TYPE 9V: 0.37 UG/ML
PNEUMOCOCCAL INTERPRETATION: NORMAL

## 2025-04-04 PROCEDURE — G8427 DOCREV CUR MEDS BY ELIG CLIN: HCPCS | Performed by: PHYSICIAN ASSISTANT

## 2025-04-04 PROCEDURE — 3017F COLORECTAL CA SCREEN DOC REV: CPT | Performed by: PHYSICIAN ASSISTANT

## 2025-04-04 PROCEDURE — G8420 CALC BMI NORM PARAMETERS: HCPCS | Performed by: PHYSICIAN ASSISTANT

## 2025-04-04 PROCEDURE — 3078F DIAST BP <80 MM HG: CPT | Performed by: PHYSICIAN ASSISTANT

## 2025-04-04 PROCEDURE — 3074F SYST BP LT 130 MM HG: CPT | Performed by: PHYSICIAN ASSISTANT

## 2025-04-04 PROCEDURE — 99214 OFFICE O/P EST MOD 30 MIN: CPT | Performed by: PHYSICIAN ASSISTANT

## 2025-04-04 PROCEDURE — 96372 THER/PROPH/DIAG INJ SC/IM: CPT | Performed by: PHYSICIAN ASSISTANT

## 2025-04-04 PROCEDURE — 1036F TOBACCO NON-USER: CPT | Performed by: PHYSICIAN ASSISTANT

## 2025-04-04 RX ORDER — TRIAMCINOLONE ACETONIDE 40 MG/ML
40 INJECTION, SUSPENSION INTRA-ARTICULAR; INTRAMUSCULAR ONCE
Status: COMPLETED | OUTPATIENT
Start: 2025-04-04 | End: 2025-04-04

## 2025-04-04 RX ADMIN — TRIAMCINOLONE ACETONIDE 40 MG: 40 INJECTION, SUSPENSION INTRA-ARTICULAR; INTRAMUSCULAR at 15:50

## 2025-04-04 NOTE — TELEPHONE ENCOUNTER
Can she come in just for injection or does she need an appointment through express care, do you know?

## 2025-04-04 NOTE — TELEPHONE ENCOUNTER
Patient seen through [facility] yesterday. Declined Kenalog injection, but now would like to come back in and get it. Can she come through express for just injection or does she need an express appointment?

## 2025-04-04 NOTE — PROGRESS NOTES
25  Ama Juarez : 1970 Sex: female  Age 54 y.o.      Subjective:  Chief Complaint   Patient presents with    Injections     Patient has a couple questions about a shot- Steroid shots increase BP?  Requested steroid shot   Seein 4/3/25         HPI:     History of Present Illness  The patient is a 54-year-old female who presents for evaluation of sinus congestion and drainage and for an intramuscular injection of Kenalog.    She was seen in the clinic on 2025 but declined the injection at that time. She has since reconsidered and is now requesting the injection. She reports feeling better than her condition yesterday but acknowledges that she is still in the process of recovery. Concern is expressed about the potential impact of the Kenalog injection on her blood pressure, which she plans to monitor closely. She is contemplating rescheduling her upcoming gynecologist appointment due to this concern. She is currently on antibiotics. No systemic symptoms such as fever or chills, and no neurological symptoms such as lightheadedness or dizziness are reported.            ROS:   Unless otherwise stated in this report the patient's positive and negative responses for review of systems for constitutional, eyes, ENT, cardiovascular, respiratory, gastrointestinal, neurological, , musculoskeletal, and integument systems and related systems to the presenting problem are either stated in the history of present illness or were not pertinent or were negative for the symptoms and/or complaints related to the presenting medical problem.  Positives and pertinent negatives as per HPI.  All others reviewed and are negative.      PMH:     Past Medical History:   Diagnosis Date    Anxiety     Common variable immunodeficiency     FU with Dr. Lr, no recent issues, stable     Dysmenorrhea     Environmental allergies     Hypertension     Kidney stone     Sacroiliac dysfunction     Thyroid disease

## 2025-04-04 NOTE — TELEPHONE ENCOUNTER
Patient notified. Could not get a hold of Sarah or Nba. Advised patient to come through express for another visit and get the injection done then.

## 2025-04-04 NOTE — TELEPHONE ENCOUNTER
I am not sure if the patient can just come in for an injection or if she does need to be evaluated again, either way she can return for the injection

## 2025-04-18 ENCOUNTER — APPOINTMENT (OUTPATIENT)
Dept: OTOLARYNGOLOGY | Facility: CLINIC | Age: 55
End: 2025-04-18
Payer: COMMERCIAL

## 2025-04-18 SDOH — HEALTH STABILITY: PHYSICAL HEALTH: ON AVERAGE, HOW MANY MINUTES DO YOU ENGAGE IN EXERCISE AT THIS LEVEL?: 60 MIN

## 2025-04-18 SDOH — HEALTH STABILITY: PHYSICAL HEALTH: ON AVERAGE, HOW MANY DAYS PER WEEK DO YOU ENGAGE IN MODERATE TO STRENUOUS EXERCISE (LIKE A BRISK WALK)?: 5 DAYS

## 2025-04-21 ENCOUNTER — OFFICE VISIT (OUTPATIENT)
Dept: PRIMARY CARE CLINIC | Age: 55
End: 2025-04-21

## 2025-04-21 VITALS
HEART RATE: 120 BPM | HEIGHT: 63 IN | DIASTOLIC BLOOD PRESSURE: 78 MMHG | SYSTOLIC BLOOD PRESSURE: 120 MMHG | TEMPERATURE: 98 F | WEIGHT: 140 LBS | BODY MASS INDEX: 24.8 KG/M2 | OXYGEN SATURATION: 98 %

## 2025-04-21 DIAGNOSIS — E55.9 VITAMIN D INSUFFICIENCY: ICD-10-CM

## 2025-04-21 DIAGNOSIS — Z30.41 ENCOUNTER FOR SURVEILLANCE OF CONTRACEPTIVE PILLS: ICD-10-CM

## 2025-04-21 DIAGNOSIS — Z13.220 ENCOUNTER FOR LIPID SCREENING FOR CARDIOVASCULAR DISEASE: ICD-10-CM

## 2025-04-21 DIAGNOSIS — R73.01 IMPAIRED FASTING GLUCOSE: ICD-10-CM

## 2025-04-21 DIAGNOSIS — E03.9 ACQUIRED HYPOTHYROIDISM: ICD-10-CM

## 2025-04-21 DIAGNOSIS — F84.0 AUTISTIC DISORDER: ICD-10-CM

## 2025-04-21 DIAGNOSIS — F41.1 GAD (GENERALIZED ANXIETY DISORDER): ICD-10-CM

## 2025-04-21 DIAGNOSIS — I10 PRIMARY HYPERTENSION: Primary | ICD-10-CM

## 2025-04-21 DIAGNOSIS — Z13.6 ENCOUNTER FOR LIPID SCREENING FOR CARDIOVASCULAR DISEASE: ICD-10-CM

## 2025-04-21 PROBLEM — J34.89 NASAL OBSTRUCTION: Status: ACTIVE | Noted: 2025-03-06

## 2025-04-21 PROBLEM — J34.3 HYPERTROPHY OF NASAL TURBINATES: Status: ACTIVE | Noted: 2025-03-06

## 2025-04-21 PROBLEM — J34.2 DEVIATED NASAL SEPTUM: Status: ACTIVE | Noted: 2025-03-06

## 2025-04-21 RX ORDER — NORETHINDRONE ACETATE AND ETHINYL ESTRADIOL .02; 1 MG/1; MG/1
1 TABLET ORAL DAILY
Qty: 4 PACKET | Refills: 3 | Status: SHIPPED | OUTPATIENT
Start: 2025-04-21

## 2025-04-21 ASSESSMENT — ENCOUNTER SYMPTOMS
ABDOMINAL PAIN: 0
COUGH: 0
SHORTNESS OF BREATH: 0
DIARRHEA: 0
WHEEZING: 0
NAUSEA: 0
VOMITING: 0

## 2025-04-21 NOTE — PROGRESS NOTES
High Cholesterol Father     Immune Disorder Father     Stroke Father       There are no preventive care reminders to display for this patient.  There are no preventive care reminders to display for this patient.   There are no preventive care reminders to display for this patient.   Health Maintenance Due   Topic    DTaP/Tdap/Td vaccine (1 - Tdap)      Health Maintenance   Topic Date Due    Hepatitis B vaccine (1 of 3 - 19+ 3-dose series) Never done    DTaP/Tdap/Td vaccine (1 - Tdap) Never done    Cervical cancer screen  Never done    Breast cancer screen  Never done    Annual Wellness Visit (Medicare Advantage)  01/01/2025    COVID-19 Vaccine (6 - Moderna risk 2024-25 season) 04/11/2025    Depression Monitoring  06/10/2025    Colorectal Cancer Screen  10/31/2026    Pneumococcal 50+ years Vaccine (4 of 4 - PCV20 or PCV21) 11/15/2027    Lipids  12/09/2029    Flu vaccine  Completed    Shingles vaccine  Completed    Hepatitis A vaccine  Aged Out    Hib vaccine  Aged Out    Polio vaccine  Aged Out    Meningococcal (ACWY) vaccine  Aged Out    Meningococcal B vaccine  Aged Out    Depression Screen  Discontinued    Pneumococcal 0-49 years Vaccine  Discontinued    Diabetes screen  Discontinued    Hepatitis C screen  Discontinued    HIV screen  Discontinued      There are no preventive care reminders to display for this patient.   There are no preventive care reminders to display for this patient.     BP (!) 146/88   Pulse (!) 120   Temp 98 °F (36.7 °C)   Ht 1.6 m (5' 2.99\")   Wt 63.5 kg (140 lb)   SpO2 98%   BMI 24.81 kg/m²     Patient Care Team:  Odalis Apodaca DO as PCP - General (Family Medicine)  Petra Olmedo DO as PCP - Empaneled Provider    This chart has been completed using [employer]. While attempts have been made to ensure accuracy, certain words or phrases may not be entered as intended.          An electronic signature was used to authenticate this note.    --Odalis Apodaca DO

## 2025-04-21 NOTE — ASSESSMENT & PLAN NOTE
Ordered lab work taking medication as prescribed without any issues.  Orders:    TSH; Future    T4, Free; Future

## 2025-04-21 NOTE — ASSESSMENT & PLAN NOTE
Blood pressure is elevated on recheck it is elevated.  Patient does check her blood pressure at home and it is usually within normal limits.  Patient states that when she comes to doctors offices she gets very nervous and so it was raises her blood pressure and heart rate.  Patient currently asymptomatic.  Patient is taking her blood pressure medication as prescribed.  Order placed lab orders to get done prior to her wellness visit in the summer.  Orders:    Comprehensive Metabolic Panel; Future    CBC with Auto Differential; Future

## 2025-05-16 DIAGNOSIS — I10 PRIMARY HYPERTENSION: ICD-10-CM

## 2025-05-16 NOTE — TELEPHONE ENCOUNTER
Requested Prescriptions     Pending Prescriptions Disp Refills    lisinopril (PRINIVIL;ZESTRIL) 10 MG tablet [Pharmacy Med Name: LISINOPRIL TABS 10MG] 90 tablet 3     Sig: TAKE 1 TABLET DAILY

## 2025-05-19 RX ORDER — LISINOPRIL 20 MG/1
20 TABLET ORAL DAILY
Qty: 30 TABLET | Refills: 3 | Status: SHIPPED | OUTPATIENT
Start: 2025-05-19 | End: 2025-05-19 | Stop reason: SDUPTHER

## 2025-05-19 RX ORDER — LISINOPRIL 20 MG/1
20 TABLET ORAL DAILY
Qty: 90 TABLET | Refills: 1 | Status: SHIPPED | OUTPATIENT
Start: 2025-05-19

## 2025-05-19 NOTE — TELEPHONE ENCOUNTER
Requested Prescriptions     Pending Prescriptions Disp Refills    lisinopril (PRINIVIL;ZESTRIL) 10 MG tablet [Pharmacy Med Name: LISINOPRIL TABS 10MG] 90 tablet 3     Sig: TAKE 1 TABLET DAILY    lisinopril (PRINIVIL;ZESTRIL) 20 MG tablet 90 tablet 1     Sig: Take 1 tablet by mouth daily     Pt stated 20 mg is current dose

## 2025-05-28 ENCOUNTER — TELEPHONE (OUTPATIENT)
Dept: PRIMARY CARE CLINIC | Age: 55
End: 2025-05-28

## 2025-05-28 NOTE — TELEPHONE ENCOUNTER
Patient calling. Patient has been taking 30mg of Lisinopril. Said she has a 20mg and a 10mg at home. Said she has been taking 30mg since after seeing you 4/21. States her blood pressure has been perfect on the 30mg and asking if you change her medication to Lisinopril 30mg one tablet daily.     Checks her blood pressure at home on a regular basis and her numbers were ranging 118/79, 120/80.     Any long term meds need sent to [facility].

## 2025-05-29 RX ORDER — LISINOPRIL 30 MG/1
30 TABLET ORAL DAILY
Qty: 90 TABLET | Refills: 1 | Status: SHIPPED | OUTPATIENT
Start: 2025-05-29

## 2025-06-18 DIAGNOSIS — I10 PRIMARY HYPERTENSION: ICD-10-CM

## 2025-06-18 DIAGNOSIS — Z13.6 ENCOUNTER FOR LIPID SCREENING FOR CARDIOVASCULAR DISEASE: ICD-10-CM

## 2025-06-18 DIAGNOSIS — E03.9 ACQUIRED HYPOTHYROIDISM: ICD-10-CM

## 2025-06-18 DIAGNOSIS — E55.9 VITAMIN D INSUFFICIENCY: ICD-10-CM

## 2025-06-18 DIAGNOSIS — R73.01 IMPAIRED FASTING GLUCOSE: ICD-10-CM

## 2025-06-18 DIAGNOSIS — Z13.220 ENCOUNTER FOR LIPID SCREENING FOR CARDIOVASCULAR DISEASE: ICD-10-CM

## 2025-06-18 DIAGNOSIS — F41.1 GAD (GENERALIZED ANXIETY DISORDER): ICD-10-CM

## 2025-06-18 DIAGNOSIS — Z30.41 ENCOUNTER FOR SURVEILLANCE OF CONTRACEPTIVE PILLS: ICD-10-CM

## 2025-06-18 LAB
ALBUMIN: 3.9 G/DL (ref 3.5–5.2)
ALP BLD-CCNC: 46 U/L (ref 35–104)
ALT SERPL-CCNC: 31 U/L (ref 0–35)
ANION GAP SERPL CALCULATED.3IONS-SCNC: 13 MMOL/L (ref 7–16)
AST SERPL-CCNC: 28 U/L (ref 0–35)
BASOPHILS ABSOLUTE: 0.03 K/UL (ref 0–0.2)
BASOPHILS RELATIVE PERCENT: 1 % (ref 0–2)
BILIRUB SERPL-MCNC: 0.3 MG/DL (ref 0–1.2)
BUN BLDV-MCNC: 21 MG/DL (ref 6–20)
CALCIUM SERPL-MCNC: 8.9 MG/DL (ref 8.6–10)
CHLORIDE BLD-SCNC: 104 MMOL/L (ref 98–107)
CHOLESTEROL, TOTAL: 181 MG/DL
CO2: 20 MMOL/L (ref 22–29)
CREAT SERPL-MCNC: 0.9 MG/DL (ref 0.5–1)
EOSINOPHILS ABSOLUTE: 0.08 K/UL (ref 0.05–0.5)
EOSINOPHILS RELATIVE PERCENT: 1 % (ref 0–6)
GFR, ESTIMATED: 77 ML/MIN/1.73M2
GLUCOSE BLD-MCNC: 83 MG/DL (ref 74–99)
HBA1C MFR BLD: 5.3 % (ref 4–5.6)
HCT VFR BLD CALC: 45 % (ref 34–48)
HDLC SERPL-MCNC: 84 MG/DL
HEMOGLOBIN: 14.7 G/DL (ref 11.5–15.5)
IMMATURE GRANULOCYTES %: 0 % (ref 0–5)
IMMATURE GRANULOCYTES ABSOLUTE: <0.03 K/UL (ref 0–0.58)
LDL CHOLESTEROL: 84 MG/DL
LYMPHOCYTES ABSOLUTE: 2.18 K/UL (ref 1.5–4)
LYMPHOCYTES RELATIVE PERCENT: 33 % (ref 20–42)
MAGNESIUM: 2 MG/DL (ref 1.6–2.6)
MCH RBC QN AUTO: 31.3 PG (ref 26–35)
MCHC RBC AUTO-ENTMCNC: 32.7 G/DL (ref 32–34.5)
MCV RBC AUTO: 95.7 FL (ref 80–99.9)
MONOCYTES ABSOLUTE: 0.43 K/UL (ref 0.1–0.95)
MONOCYTES RELATIVE PERCENT: 7 % (ref 2–12)
NEUTROPHILS ABSOLUTE: 3.83 K/UL (ref 1.8–7.3)
NEUTROPHILS RELATIVE PERCENT: 58 % (ref 43–80)
PDW BLD-RTO: 12.6 % (ref 11.5–15)
PLATELET # BLD: 273 K/UL (ref 130–450)
PMV BLD AUTO: 9.9 FL (ref 7–12)
POTASSIUM SERPL-SCNC: 3.4 MMOL/L (ref 3.5–5.1)
RBC # BLD: 4.7 M/UL (ref 3.5–5.5)
SODIUM BLD-SCNC: 136 MMOL/L (ref 136–145)
T4 FREE: 1.1 NG/DL (ref 0.9–1.7)
TOTAL PROTEIN: 6.7 G/DL (ref 6.4–8.3)
TRIGL SERPL-MCNC: 66 MG/DL
TSH SERPL DL<=0.05 MIU/L-ACNC: 3.01 UIU/ML (ref 0.27–4.2)
VITAMIN D 25-HYDROXY: 83.1 NG/ML (ref 30–100)
VLDLC SERPL CALC-MCNC: 13 MG/DL
WBC # BLD: 6.6 K/UL (ref 4.5–11.5)

## 2025-06-19 ENCOUNTER — RESULTS FOLLOW-UP (OUTPATIENT)
Dept: PRIMARY CARE CLINIC | Age: 55
End: 2025-06-19

## 2025-06-19 DIAGNOSIS — E87.6 HYPOKALEMIA: Primary | ICD-10-CM

## 2025-06-19 RX ORDER — POTASSIUM CHLORIDE 750 MG/1
10 TABLET, EXTENDED RELEASE ORAL DAILY
Qty: 7 TABLET | Refills: 0 | Status: SHIPPED | OUTPATIENT
Start: 2025-06-19 | End: 2025-06-26

## 2025-06-20 LAB — PREGNANCY, URINE: NEGATIVE

## 2025-06-21 SDOH — ECONOMIC STABILITY: FOOD INSECURITY: WITHIN THE PAST 12 MONTHS, THE FOOD YOU BOUGHT JUST DIDN'T LAST AND YOU DIDN'T HAVE MONEY TO GET MORE.: NEVER TRUE

## 2025-06-21 SDOH — ECONOMIC STABILITY: INCOME INSECURITY: IN THE LAST 12 MONTHS, WAS THERE A TIME WHEN YOU WERE NOT ABLE TO PAY THE MORTGAGE OR RENT ON TIME?: NO

## 2025-06-21 SDOH — HEALTH STABILITY: PHYSICAL HEALTH: ON AVERAGE, HOW MANY DAYS PER WEEK DO YOU ENGAGE IN MODERATE TO STRENUOUS EXERCISE (LIKE A BRISK WALK)?: 5 DAYS

## 2025-06-21 SDOH — ECONOMIC STABILITY: FOOD INSECURITY: WITHIN THE PAST 12 MONTHS, YOU WORRIED THAT YOUR FOOD WOULD RUN OUT BEFORE YOU GOT MONEY TO BUY MORE.: NEVER TRUE

## 2025-06-21 SDOH — HEALTH STABILITY: PHYSICAL HEALTH: ON AVERAGE, HOW MANY MINUTES DO YOU ENGAGE IN EXERCISE AT THIS LEVEL?: 60 MIN

## 2025-06-21 SDOH — ECONOMIC STABILITY: TRANSPORTATION INSECURITY
IN THE PAST 12 MONTHS, HAS THE LACK OF TRANSPORTATION KEPT YOU FROM MEDICAL APPOINTMENTS OR FROM GETTING MEDICATIONS?: NO

## 2025-06-21 ASSESSMENT — PATIENT HEALTH QUESTIONNAIRE - PHQ9
SUM OF ALL RESPONSES TO PHQ QUESTIONS 1-9: 0
2. FEELING DOWN, DEPRESSED OR HOPELESS: NOT AT ALL
SUM OF ALL RESPONSES TO PHQ QUESTIONS 1-9: 0
SUM OF ALL RESPONSES TO PHQ QUESTIONS 1-9: 0
1. LITTLE INTEREST OR PLEASURE IN DOING THINGS: NOT AT ALL
SUM OF ALL RESPONSES TO PHQ QUESTIONS 1-9: 0

## 2025-06-21 ASSESSMENT — LIFESTYLE VARIABLES
HOW MANY STANDARD DRINKS CONTAINING ALCOHOL DO YOU HAVE ON A TYPICAL DAY: PATIENT DOES NOT DRINK
HOW OFTEN DO YOU HAVE SIX OR MORE DRINKS ON ONE OCCASION: 1
HOW OFTEN DO YOU HAVE A DRINK CONTAINING ALCOHOL: 1
HOW MANY STANDARD DRINKS CONTAINING ALCOHOL DO YOU HAVE ON A TYPICAL DAY: 0
HOW OFTEN DO YOU HAVE A DRINK CONTAINING ALCOHOL: NEVER

## 2025-06-23 ENCOUNTER — OFFICE VISIT (OUTPATIENT)
Dept: PRIMARY CARE CLINIC | Age: 55
End: 2025-06-23
Payer: COMMERCIAL

## 2025-06-23 VITALS
OXYGEN SATURATION: 98 % | RESPIRATION RATE: 16 BRPM | WEIGHT: 135.2 LBS | TEMPERATURE: 98.1 F | BODY MASS INDEX: 23.96 KG/M2 | DIASTOLIC BLOOD PRESSURE: 82 MMHG | HEIGHT: 63 IN | HEART RATE: 134 BPM | SYSTOLIC BLOOD PRESSURE: 120 MMHG

## 2025-06-23 DIAGNOSIS — Z00.00 MEDICARE ANNUAL WELLNESS VISIT, SUBSEQUENT: Primary | ICD-10-CM

## 2025-06-23 PROCEDURE — 3017F COLORECTAL CA SCREEN DOC REV: CPT | Performed by: STUDENT IN AN ORGANIZED HEALTH CARE EDUCATION/TRAINING PROGRAM

## 2025-06-23 PROCEDURE — G0439 PPPS, SUBSEQ VISIT: HCPCS | Performed by: STUDENT IN AN ORGANIZED HEALTH CARE EDUCATION/TRAINING PROGRAM

## 2025-06-23 PROCEDURE — 3074F SYST BP LT 130 MM HG: CPT | Performed by: STUDENT IN AN ORGANIZED HEALTH CARE EDUCATION/TRAINING PROGRAM

## 2025-06-23 PROCEDURE — 3079F DIAST BP 80-89 MM HG: CPT | Performed by: STUDENT IN AN ORGANIZED HEALTH CARE EDUCATION/TRAINING PROGRAM

## 2025-06-23 RX ORDER — POTASSIUM CHLORIDE 750 MG/1
10 TABLET, EXTENDED RELEASE ORAL DAILY
Qty: 7 TABLET | Refills: 0 | OUTPATIENT
Start: 2025-06-23 | End: 2025-06-30

## 2025-06-23 SDOH — ECONOMIC STABILITY: FOOD INSECURITY: WITHIN THE PAST 12 MONTHS, THE FOOD YOU BOUGHT JUST DIDN'T LAST AND YOU DIDN'T HAVE MONEY TO GET MORE.: NEVER TRUE

## 2025-06-23 SDOH — ECONOMIC STABILITY: FOOD INSECURITY: WITHIN THE PAST 12 MONTHS, YOU WORRIED THAT YOUR FOOD WOULD RUN OUT BEFORE YOU GOT MONEY TO BUY MORE.: NEVER TRUE

## 2025-06-23 ASSESSMENT — PATIENT HEALTH QUESTIONNAIRE - PHQ9
3. TROUBLE FALLING OR STAYING ASLEEP: NOT AT ALL
8. MOVING OR SPEAKING SO SLOWLY THAT OTHER PEOPLE COULD HAVE NOTICED. OR THE OPPOSITE, BEING SO FIGETY OR RESTLESS THAT YOU HAVE BEEN MOVING AROUND A LOT MORE THAN USUAL: NOT AT ALL
5. POOR APPETITE OR OVEREATING: NOT AT ALL
4. FEELING TIRED OR HAVING LITTLE ENERGY: NOT AT ALL
2. FEELING DOWN, DEPRESSED OR HOPELESS: NOT AT ALL
9. THOUGHTS THAT YOU WOULD BE BETTER OFF DEAD, OR OF HURTING YOURSELF: NOT AT ALL
10. IF YOU CHECKED OFF ANY PROBLEMS, HOW DIFFICULT HAVE THESE PROBLEMS MADE IT FOR YOU TO DO YOUR WORK, TAKE CARE OF THINGS AT HOME, OR GET ALONG WITH OTHER PEOPLE: NOT DIFFICULT AT ALL
SUM OF ALL RESPONSES TO PHQ QUESTIONS 1-9: 0
7. TROUBLE CONCENTRATING ON THINGS, SUCH AS READING THE NEWSPAPER OR WATCHING TELEVISION: NOT AT ALL
SUM OF ALL RESPONSES TO PHQ QUESTIONS 1-9: 0
SUM OF ALL RESPONSES TO PHQ QUESTIONS 1-9: 0
6. FEELING BAD ABOUT YOURSELF - OR THAT YOU ARE A FAILURE OR HAVE LET YOURSELF OR YOUR FAMILY DOWN: NOT AT ALL
1. LITTLE INTEREST OR PLEASURE IN DOING THINGS: NOT AT ALL
SUM OF ALL RESPONSES TO PHQ QUESTIONS 1-9: 0

## 2025-06-23 ASSESSMENT — LIFESTYLE VARIABLES
HOW MANY STANDARD DRINKS CONTAINING ALCOHOL DO YOU HAVE ON A TYPICAL DAY: PATIENT DOES NOT DRINK
HOW OFTEN DO YOU HAVE A DRINK CONTAINING ALCOHOL: NEVER

## 2025-06-23 NOTE — PROGRESS NOTES
Medicare Annual Wellness Visit    Ama Juarez is here for Medicare AWV    Assessment & Plan   Medicare annual wellness visit, subsequent     Recommend Tdap vaccine at pharmacy   Reviewed labs potassium was slightly low and pt put on supplement due for recheck next week     Return for Medicare Annual Wellness Visit in 1 year.     Subjective   No chronic health issues discussed today   HR elevated but pt has hx of this during PCP visits. Will have pt monitor it at home if remains elevated will order holter monitor       Patient's complete Health Risk Assessment and screening values have been reviewed and are found in Flowsheets. The following problems were reviewed today and where indicated follow up appointments were made and/or referrals ordered.    Positive Risk Factor Screenings with Interventions:                      Advanced Directives:  Do you have a Living Will?: (!) No    Intervention:  has NO advanced directive - not interested in additional information                     Objective   Vitals:    06/23/25 1234   BP: 120/82   Pulse: (!) 134   Resp: 16   Temp: 98.1 °F (36.7 °C)   SpO2: 98%   Weight: 61.3 kg (135 lb 3.2 oz)   Height: 1.6 m (5' 2.99\")      Body mass index is 23.96 kg/m².        General Appearance: alert and oriented to person, place and time, well-developed and well-nourished, in no acute distress  Lungs: CTAB, no wheezing, no rhonchi, good air exchange  Heart: sinus tachycardia HR around 120, no murmurs   Skin: tanned, warm and dry             Allergies   Allergen Reactions    Prednisone      Prior to Visit Medications    Medication Sig Taking? Authorizing Provider   Sulfamethoxazole-Trimethoprim (BACTRIM PO) Take by mouth Yes Provider, MD Nicol   lisinopril (PRINIVIL;ZESTRIL) 30 MG tablet Take 1 tablet by mouth daily Yes Odalis Apodaca DO   norethindrone-ethinyl estradiol (RAMILA 1/20) 1-20 MG-MCG per tablet Take 1 tablet by mouth daily Yes Odalis Apodaca DO   Garlic 1000 MG CAPS

## 2025-06-26 RX ORDER — BUDESONIDE AND FORMOTEROL FUMARATE DIHYDRATE 160; 4.5 UG/1; UG/1
2 AEROSOL RESPIRATORY (INHALATION) 2 TIMES DAILY
Qty: 30.6 G | Refills: 3 | OUTPATIENT
Start: 2025-06-26

## 2025-07-07 DIAGNOSIS — E87.6 HYPOKALEMIA: ICD-10-CM

## 2025-07-07 LAB
ANION GAP SERPL CALCULATED.3IONS-SCNC: 13 MMOL/L (ref 7–16)
BUN BLDV-MCNC: 22 MG/DL (ref 6–20)
CALCIUM SERPL-MCNC: 9.5 MG/DL (ref 8.6–10)
CHLORIDE BLD-SCNC: 103 MMOL/L (ref 98–107)
CO2: 22 MMOL/L (ref 22–29)
CREAT SERPL-MCNC: 0.9 MG/DL (ref 0.5–1)
GFR, ESTIMATED: 77 ML/MIN/1.73M2
GLUCOSE BLD-MCNC: 90 MG/DL (ref 74–99)
POTASSIUM SERPL-SCNC: 4.3 MMOL/L (ref 3.5–5.1)
SODIUM BLD-SCNC: 137 MMOL/L (ref 136–145)

## 2025-07-08 ENCOUNTER — RESULTS FOLLOW-UP (OUTPATIENT)
Dept: PRIMARY CARE CLINIC | Age: 55
End: 2025-07-08

## 2025-07-30 ENCOUNTER — TELEPHONE (OUTPATIENT)
Dept: PRIMARY CARE CLINIC | Age: 55
End: 2025-07-30

## 2025-07-30 DIAGNOSIS — Z30.41 ENCOUNTER FOR SURVEILLANCE OF CONTRACEPTIVE PILLS: ICD-10-CM

## 2025-07-30 RX ORDER — NORETHINDRONE ACETATE AND ETHINYL ESTRADIOL .02; 1 MG/1; MG/1
1 TABLET ORAL DAILY
Qty: 4 PACKET | Refills: 3 | Status: SHIPPED | OUTPATIENT
Start: 2025-07-30 | End: 2025-07-31 | Stop reason: SDUPTHER

## 2025-07-31 DIAGNOSIS — Z30.41 ENCOUNTER FOR SURVEILLANCE OF CONTRACEPTIVE PILLS: ICD-10-CM

## 2025-07-31 RX ORDER — NORETHINDRONE ACETATE AND ETHINYL ESTRADIOL .02; 1 MG/1; MG/1
1 TABLET ORAL DAILY
Qty: 1 PACKET | Refills: 0 | Status: SHIPPED | OUTPATIENT
Start: 2025-07-31 | End: 2025-08-01 | Stop reason: SDUPTHER

## 2025-07-31 RX ORDER — NORETHINDRONE ACETATE AND ETHINYL ESTRADIOL .02; 1 MG/1; MG/1
1 TABLET ORAL DAILY
Qty: 4 PACKET | Refills: 3 | Status: SHIPPED | OUTPATIENT
Start: 2025-07-31 | End: 2025-07-31 | Stop reason: SDUPTHER

## 2025-08-01 ENCOUNTER — TELEPHONE (OUTPATIENT)
Dept: PRIMARY CARE CLINIC | Age: 55
End: 2025-08-01

## 2025-08-01 DIAGNOSIS — Z30.41 ENCOUNTER FOR SURVEILLANCE OF CONTRACEPTIVE PILLS: ICD-10-CM

## 2025-08-01 RX ORDER — NORETHINDRONE ACETATE AND ETHINYL ESTRADIOL .02; 1 MG/1; MG/1
1 TABLET ORAL DAILY
Qty: 90 TABLET | Refills: 3 | Status: SHIPPED | OUTPATIENT
Start: 2025-08-01

## 2025-08-01 NOTE — TELEPHONE ENCOUNTER
Pt says her birth control that was sent to [facility] was cancelled and she's asking why. [facility] which [facility] but the other was cancelled

## 2025-08-01 NOTE — TELEPHONE ENCOUNTER
Patient notified that script was resent to [facility]. Patient also said that for some reason the short term supply was cancelled at [facility]. I did call and leave a voicemail with pharmacy refill a short term supply of script so patient does not run out.

## (undated) DEVICE — 3M™ RED DOT™ MONITORING ELECTRODE WITH FOAM TAPE AND STICKY GEL 2560, 50/BAG, 20/CASE, 72/PLT: Brand: RED DOT™

## (undated) DEVICE — 12 ML SYRINGE,LUER-LOCK TIP: Brand: MONOJECT

## (undated) DEVICE — 6 ML SYRINGE LUER-LOCK TIP: Brand: MONOJECT

## (undated) DEVICE — GAUZE,SPONGE,4"X4",8PLY,STRL,LF,10/TRAY: Brand: MEDLINE

## (undated) DEVICE — NEEDLE HYPO 18GA L1.5IN PNK POLYPR HUB S STL REG BVL STR

## (undated) DEVICE — GLOVE ORANGE PI 7 1/2   MSG9075

## (undated) DEVICE — BANDAGE ADH W1XL3IN NAT FAB WVN FLX DURABLE N ADH PD SEAL

## (undated) DEVICE — NEEDLE HYPO 25GA L1.5IN BLU POLYPR HUB S STL REG BVL STR

## (undated) DEVICE — SYRINGE MED 5ML STD CLR PLAS LUERLOCK TIP N CTRL DISP

## (undated) DEVICE — Device: Brand: PORTEX

## (undated) DEVICE — NON-DEHP CATHETER EXTENSION SET, MALE LUER LOCK ADAPTER